# Patient Record
Sex: FEMALE | Race: WHITE | Employment: PART TIME | URBAN - METROPOLITAN AREA
[De-identification: names, ages, dates, MRNs, and addresses within clinical notes are randomized per-mention and may not be internally consistent; named-entity substitution may affect disease eponyms.]

---

## 2017-08-08 PROBLEM — R53.83 FATIGUE, UNSPECIFIED TYPE: Status: ACTIVE | Noted: 2017-08-08

## 2017-12-27 PROBLEM — F41.9 ANXIETY: Status: ACTIVE | Noted: 2017-12-27

## 2017-12-27 PROBLEM — N92.0 MENORRHAGIA WITH REGULAR CYCLE: Status: ACTIVE | Noted: 2017-12-27

## 2021-06-02 NOTE — ED PROVIDER NOTES
Swelling  Patient Seen in: [facility] Emergency Department      History   Patient presents with:  Hypertension  Allergic Rxn Allergies    Stated Complaint: htn, rash    HPI/Subjective:   HPI    49-year-old female presents to the emergency department w Tobacco Use      Smoking status: Current Every Day Smoker        Packs/day: 0.50        Years: 30.00        Pack years: 15        Types: Cigarettes      Smokeless tobacco: Former User        Types: Chew        Quit date: 5/6/2008      Tobacco comment: Wenceslao Chowdary back: Normal range of motion and neck supple. Lymphadenopathy:      Cervical: No cervical adenopathy. Skin:     General: Skin is warm and dry. Coloration: Skin is not pale.       Comments: Flushed face and chest   Neurological:      General: No foc TECHNIQUE:  AP chest radiograph was obtained. COMPARISON:  None. INDICATIONS:  htn, rash  PATIENT STATED HISTORY: (As transcribed by Technologist)  Patient offered no additional history at this time.     FINDINGS:  Normal heart size and pulmonary vascular [address]  [phone]    Schedule an appointment as soon as possible for a visit            Medications Prescribed:  Discharge Medication List as of 6/2/2021  4:13 PM    START taking these medications    !!  Montelukast Sodium 10 MG Oral Tab  Take 1 tablet (1

## 2023-05-03 PROBLEM — J30.2 SEASONAL ALLERGIES: Status: ACTIVE | Noted: 2023-05-03

## 2023-05-03 PROBLEM — J02.9 SORE THROAT: Status: ACTIVE | Noted: 2023-05-01

## 2023-05-03 PROBLEM — J38.7 EPIGLOTTIC LESION: Status: ACTIVE | Noted: 2023-05-01

## 2023-05-16 NOTE — ANESTHESIA PROCEDURE NOTES
Airway  Date/Time: 5/16/2023 1:19 PM  Urgency: elective    Airway not difficult    General Information and Staff    Patient location during procedure: OR  Anesthesiologist: Raphael Rocha MD  Resident/CRNA: Paola Villavicencio CRNA  Performed: CRNA   Performed by: Paola Villavicencio CRNA  Authorized by: Raphael Rocha MD      Indications and Patient Condition  Indications for airway management: anesthesia  Sedation level: deep  Preoxygenated: yes  Patient position: sniffing  Mask difficulty assessment: 1 - vent by mask    Final Airway Details  Final airway type: endotracheal airway      Successful airway: ETT  Cuffed: yes   Successful intubation technique: direct laryngoscopy  Endotracheal tube insertion site: oral  Blade: Flory  Blade size: #3  ETT size (mm): 6.0    Cormack-Lehane Classification: grade I - full view of glottis  Placement verified by: chest auscultation and capnometry   Measured from: lips  ETT to lips (cm): 21  Number of attempts at approach: 1

## 2023-05-16 NOTE — DISCHARGE INSTRUCTIONS
Call [address] [place] [address] ENT clinic at [phone] or if it is after hours ask to have the doctor on call paged or go to the nearest Emergency Room if you have:    * Any worsening neck swelling or redness  * Any difficulty or changes to your breathing. * A temperature greater than 102F  * Vomiting that lasts more than 24 hours  * Severe pain that gets worse and is not helped by medicine  * Coughing that will not go away  * Neck pain, stiffness or has a hard time turning their head    Call with any other questions or concerns    Appointments you need to make: You should make a follow up appointment for 2 weeks after surgery. What to expect:  * You will have some neck stiffness and soreness  * This should improve over the next couple of days. Pain:  * Your may have acetaminophen (Tylenol) every 4 to 6 hours   * Your doctor may prescribe stronger pain medicine, follow your doctor's instructions for taking pain medicines  * If your doctor gives you a stronger pain medicine (roxicet or lortab), please remember that these medications contain acetaminophen (Tylenol) already. So please do NOT give Roxicet/Lortab and additional acetaminophen (tylenol) at the SAME time. * If you need additional pain medication, please call the nursing line at [phone]    Diet:  * Start with clear liquids (flat white soda, water, broth, apple juice, and popsicles)  * If you do not have an upset stomach when fully awake from surgery, a soft diet can be started. Avoid spicy, acidic or rough foods (includes toast, crackers, and potato chips)      Activity:  * Recovery takes 1-2 weeks.   Avoid rough play, gym, swimming, and contact sports during this time      With any concerns or questions, or ANY bleeding, call and ask for the ENT on call physician

## 2023-05-16 NOTE — OPERATIVE REPORT
Marcy 83 Patient Status:  Hospital Outpatient Surgery    1970 MRN [id number]   [facility] SURGERY Attending Leticia Lombardo MD   Hosp Day # 0 PCP Karen Perez MD     Direct Laryngoscopy and Biopsy Op Note  Pre-Op Diagnosis:  Airway evaluation, supraglottic lesion  Post-Op Diagnosis:  Same  Procedure:   Direct laryngoscopy and biopsy  Surgeon: Adiel  Anesthesia: General  Indications for Procedure:  Alee Willis is a very pleasant female with a history of smoking. Has a supraglottic lesion on exam in office. The above-named procedure was offered for definitive treatment. Procedure in Detail:    After informed consent was obtained from the parents, and risks and benefits of the procedure were explained, the patient was brought back to the operating room and laid supine. Next, general anesthesia was obtained, and an IV established, and lidocaine was sprayed onto the vocal folds and subglottis. The table was rotated 90 degrees away from anesthesia. An ossoff laryngoscope was used to expose. The tonsillar fossa, base of tongue, epiglottis and pyriforms were evaluated and were normal.  The laryngoscope was advanced past the epiglottis and there was a fungating lesion on the left aryepiglottic fold into the false cord. This was biopsied. The remainder of the larynx was evaluated and showed some rhonda's edema but no lesion. The patient was turned back to anesthesia. There were no complications and the patient tolerated the procedure well. EBL:  5 cc  IVF:  100cc LR  Specimens:  L AE fold, left false cord, left vallecula  UO: None  Condition:   To PACU stable  Genny Gerber MD  2023  1:53 PM

## 2023-05-16 NOTE — H&P
History and physical by Dr. Radha Moore on 5/10 reviewed and up to date. No changes to H&P.     Plan is direct laryngoscopy with biopsy

## 2023-09-20 NOTE — TELEPHONE ENCOUNTER
Dana from [facility] called for a consult. DX-Squamous cell carcinoma of the larynx. Referred by Dr Marlon Castle. Please call back. Thank you.

## 2023-09-21 ENCOUNTER — TELEPHONE (OUTPATIENT)
Dept: RADIATION ONCOLOGY | Facility: HOSPITAL | Age: 53
End: 2023-09-21

## 2023-09-21 NOTE — TELEPHONE ENCOUNTER
Returned  call from [address] [place] [address] at [employer] on this pt. I left hannah heaton .

## 2023-10-03 NOTE — PROGRESS NOTES
Outpatient Oncology Care Plan   Problem list:   fatigue   Problems related to:   disease/disease progression   Interventions:   provided general teaching   Expected outcomes:   understands plan of care   Progress towards outcome: making progress     Education Record   Learner: Patient   Barriers / Limitations: None   Method: Discussion   Outcome: Shows understanding   Comments:  Patient here as a new consult. Energy levels are poor. States she is not sleeping well due to 10/10 right jaw ,head,neck and throat pain. Was taking norco, but states it did not help manage. Oxycodone too strong. Has a constant cough. Denies any hemoptysis. Has lost 10-15 pounds in the past three months. Had recent imaging performed with [facility] today.

## 2023-10-03 NOTE — PROGRESS NOTES
Nursing Consultation Note  Patient: Estrella Kennedy  YOB: 1970  Age: 48year old  Radiation Oncologist: Dr. Mark Granda  Referring Physician: [email or web address]  Consult Date: 10/3/2023      Chemotherapy: N/a  Labs: Reviewed  Imaging: Reviewed  Is the patient of child-bearing age? No  Has the patient received radiation therapy in the past? no  Does the patient have an implantable device? No   Patient has/has had:     1. Assistive Devices: N/A    2. Flu Vaccination: yes    3. Pneumonia Vaccination:  yes    Vital Signs: There were no vitals filed for this visit., Wt Readings from Last 6 Encounters:  10/04/23 : 63.6 kg (140 lb 3.2 oz)  10/03/23 : 64.4 kg (142 lb)  05/16/23 : 64.9 kg (143 lb)  11/17/21 : 69.9 kg (154 lb)  09/29/21 : 70.3 kg (155 lb)  08/31/21 : 70.8 kg (156 lb)      Nursing Note: Hx of squamous cell of larynx [id number]. Had one year of throat pain. R>L. Had 2 biopsies that were benign. Had dysphagia and weight loss. Imaging from 9/13/23 concerning for supraglottic mass. Has 40 pack year smoking history and still smokes 1 PPD. Pt was given the option of surgery vs. RT. Pt opted for RT. Pt has pain in right side of neck/jaw. Rates 10/10. Took [place] this morning. Has been on soft diet since March/April of 2023. Denies difficulty swallowing other than some gel pills (Advil). Is now down to 1/2 pack cigarettes per day. Review of Systems   Constitutional:  Positive for appetite change, fatigue and unexpected weight change. HENT:  Positive for congestion, ear pain, postnasal drip, sinus pressure, sore throat, trouble swallowing and voice change. Eyes: Negative. Respiratory:  Positive for cough. Cardiovascular: Negative. Gastrointestinal:  Positive for constipation and nausea. Endocrine: Negative. Genitourinary: Negative. Musculoskeletal:  Positive for neck pain. Skin: Negative.     Allergic/Immunologic: Positive for environmental allergies and food allergies. Neurological:  Positive for headaches. Unsteady at times     Hematological: Negative. Psychiatric/Behavioral: Negative. Allergies:    Latex                   RASH, OTHER (SEE COMMENTS)  Ceftin [Cefuroxime]     RASH  Codeine                   Oxycodone                 Vicodin [Hydrocodon*      Clindamycin             RASH  Penicillins             RASH    Current Outpatient Medications   Medication Sig Dispense Refill    omeprazole 20 MG Oral Capsule Delayed Release Take 1 capsule (20 mg total) by mouth every morning before breakfast.      nicotine 7 MG/24HR Transdermal Patch 24 Hr Place 1 patch onto the skin daily. buPROPion HCl ER, Smoking Det, 150 MG Oral Tablet 12 Hr Take 1 tablet (150 mg total) by mouth 2 (two) times daily. HYDROcodone-acetaminophen  MG Oral Tab Take 1-2 tablets by mouth every 4 (four) hours as needed for Pain. 120 tablet 0    lisinopril 20 MG Oral Tab Take 1 tablet (20 mg total) by mouth daily. MONTELUKAST 10 MG Oral Tab TAKE 1 TABLET(10 MG) BY MOUTH EVERY NIGHT 90 tablet 0    albuterol 108 (90 Base) MCG/ACT Inhalation Aero Soln Inhale 2 puffs into the lungs every 4 (four) hours as needed. 54 g 1    Budesonide-Formoterol Fumarate (SYMBICORT) 160-4.5 MCG/ACT Inhalation Aerosol INHALE 2 PUFFS BY MOUTH EVERY MORNING 1 each 5    fluticasone propionate 50 MCG/ACT Nasal Suspension SHAKE LIQUID AND USE 2 SPRAYS IN EACH NOSTRIL DAILY 48 g 1    ALPRAZolam 0.5 MG Oral Tab Take 1 tablet (0.5 mg total) by mouth 3 (three) times daily as needed. 90 tablet 0    benzonatate 200 MG Oral Cap Take 1 capsule (200 mg total) by mouth 3 (three) times daily as needed for cough. 50 capsule 1    Simethicone (GAS-X OR) daily. albuterol (VENTOLIN) (2.5 MG/3ML) 0.083% Inhalation Nebu Soln Take 3 mL (2.5 mg total) by nebulization every 6 (six) hours as needed. EPINEPHrine 0.3 MG/0.3ML Injection Solution Auto-injector Inject 0.3 mL as directed one time. Injectf into the muscle one time as needed for anaphylaxis for up to 2 doses (Patient not taking: Reported on 10/3/2023)         Preferred Pharmacy:    [facility], [address] AT [address], [phone], [phone]  [address] [facility] [address]  Phone: [phone] Fax: [address], [address] [place] [address], [phone], [phone]  [address] [address] [address]  Phone: [phone] Fax: [address], [phone], [address]  [place] [address]  Phone: [phone] Fax: [address] [facility] [address] [facility] [address], [address] [facility],[address] AT [facility] [address] [facility] [address], [phone], [phone]  [facility] [address]  Phone: [phone] Fax: [phone]      Past Medical History:   Diagnosis Date    Anxiety 12/27/2017    BLOOD DISORDER     anemia    Esophageal reflux     Extrinsic asthma, unspecified     Hx of motion sickness     Irritable bowel syndrome     Migraines     Problems with swallowing     5/3/23 pt states has been limited to soft foods d/t growth in throat; denies difficulty swallowing liquids    Reflux     Ulcer        Past Surgical History:   Procedure Laterality Date    BENIGN BIOPSY RIGHT  05/22/2013    FA    ENDOMETRIAL ABLATION      EXCISION TURBINATE,SUBMUCOUS  08/14/2013    Procedure: ADENOIDECTOMY;  Surgeon: Saeed Steiner MD;  Location: [address] [address]  08/14/2013    Procedure: ADENOIDECTOMY;  Surgeon: Saeed Steiner MD;  Location: [address] [place] [address] HISTORY      CS, esphogel polypectomy x2    OTHER SURGICAL HISTORY      liposuction    REMOVAL ADENOIDS,PRIMARY,12+ Y/O 08/14/2013    Procedure: ENDOSCOPIC SUBMUCOUS RESECTION INFERIOR TURBINATES;  Surgeon: Gretta Loza MD;  Location: [facility] [place] OF NASAL SEPTUM  08/14/2013    Procedure: SEPTOPLASTY NASAL;  Surgeon: Gretta Loza MD;  Location: [address] [place] [address]       Social History    Socioeconomic History      Marital status:       Spouse name: Not on file      Number of children: 1      Years of education: Not on file      Highest education level: Not on file    Occupational History      Occupation:         Comment: working 3 days per week    Tobacco Use      Smoking status: Every Day        Packs/day: 0.50        Years: 30.00        Additional pack years: 0.00        Total pack years: 15.00        Types: Cigarettes      Smokeless tobacco: Former        Types: Chew        Quit date: 5/6/2008    Vaping Use      Vaping Use: Never used    Substance and Sexual Activity      Alcohol use: Yes        Alcohol/week: 5.0 standard drinks of alcohol        Types: 6 Standard drinks or equivalent per week        Comment: 2x a week      Drug use: No      Sexual activity: Not on file    Other Topics      Concerns:        Not on file    Social History Narrative      - lives alone      Has one grown son - lives in [place] [address] Determinants of Health  Financial Resource Strain: Not on file  Food Insecurity: Not on file  Transportation Needs: Not on file  Physical Activity: Not on file  Stress: Not on file  Social Connections: Not on file  Housing Stability: Not on file    ECOG:  Grade 0 - Fully active, able to carry on all predisease activities without restrictions. Education:  Knowledge Deficit Plan Of Care:    Problem:  Knowledge Deficit    Problems related to:    Radiation therapy    Interventions:   Instruct on treatment planning    Expected Outcomes:  Knowledge of radiation therapy    Progress Toward Outcome:  Making progress    Pamphlets/Handouts Given to Patient:  Understanding radiation therapy      Are ADL's met? Yes  Does patient feel safe in their environment? Yes  Care decisions:  Patient and/or surrogate IS involved in care decisions. Advanced directives:  Patient DOES NOT have advanced directives.   Transportation:  Adequate transportation available for expected visits    Pain: R sided neck pain

## 2023-10-04 PROBLEM — Z51.5 PALLIATIVE CARE BY SPECIALIST: Status: ACTIVE | Noted: 2023-10-04

## 2023-10-04 PROBLEM — C76.0 HEAD AND NECK CANCER (HCC): Status: ACTIVE | Noted: 2023-10-04

## 2023-10-04 PROBLEM — C32.8: Status: ACTIVE | Noted: 2023-10-04

## 2023-10-04 NOTE — CONSULTS
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 Presents with long history of sore throat. Evolved into dysphagia. 10 pound weight loss. Right neck and ear pain. Seen by multiple local ENTs. Apparently had laryngoscopy with biopsy in May and in August that were nondiagnostic. Decided to transfer care to [facility]. Saw Dr. Marie Odonnell. He noted a supraglottic mass in the office exam.      DL under anesthesia 9/11/2023 revealed a large mass involving the entire epiglottis, both lingual and laryngeal surfaces, right more than left, extending onto the right AE fold, abutting the base of tongue but not invading it, sparing the false and true vocal cords, without involvement of the hypopharynx. Biopsy shows squamous cell carcinoma, predominantly p16 positive which is somewhat surprising. CT neck and chest 9/13/2023 shows the right epiglottic mass extending onto the right AEF, narrowing the airway, not involving the preepiglottic space, without clear cervical adenopathy. The cartilage structures are intact. PET scan 10/3/2023 shows the supraglottic laryngeal mass with SUV 19.8. Several small right-sided and left-sided cervical nodes with SUV ranging between 2.6 and 4.7. The patient consulted with Dr. Sharonda Monet yesterday. She is reluctant to consider chemotherapy with concerns of nausea, cachexia, complete alopecia. She will see the palliative care service today. A little drowsy on Norco.  History of ulcer disease. History of esophageal dilatation more than 5 years ago. Has been on a soft diet since then. Last saw a DDS around 2020.     PMH  -Anxiety, GERD, esophageal disease status post dilatation, peptic ulcer disease, IBS, migraines, asthma    PSH  -Endometrial ablation, sinus surgery, , esophageal polypectomies, liposuction    MEDS  -Albuterol, Xanax, Tessalon, Symbicort, Zyban, Norco, Prinivil, montelukast, nicotine patch, Prilosec, Gas-X    SH   -, 1 child, smoking 1 pack a day, 30 pack years, works Thursday and Friday, not a whole lot of support at home    [facility]  -Father had esophageal/lung cancer    ROS  -Throat pain, solid food dysphagia, both oral pharyngeal and esophageal, mild weight loss, ear pain, mild hoarseness    PHYSICAL EXAM   ECO  139 pounds, 10 out of 10 pain  GEN:  No acute distress. HEENT:  I could not palpate any cervical adenopathy. Unremarkable oral cavity. Unremarkable oropharynx. Teeth in ok repair. CHEST:  Regular rate and rhythm. Clear to auscultation. Good respiratory effort. ABDOMEN: Soft, non-tender. EXT:  No edema. NEURO:  Alert, oriented. Cranial nerves grossly intact. 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 T2 N0 disease prior to PET. This is bulky disease. Airway somewhat narrow, but no trach needed. Based on PET, will now have to assume N2c disease. CT guided biopsy of a node may help rule in node+ disease, but hard to rule out node involvement unless we biopsy numerous nodes. Atypical PET+ nodes anterior to R lateral pterygoid and anterior to R cricoid. She is p16+, but again, atypical location and smoking history, so cannot approach her as typical p16+ patient. Surgery first with jessica dissection would probably lead to a need for postoperative radiation anyway. Agree with and favor organ preserving chemoradiation as the next step. Patient now open to weekly chemo option. Had discussion about the many short and long term effects of radiation to the head/neck.  Dry mouth, weight loss, taste bud dysfunction, dental issues, dysphagia, voice changes, soft tissue swelling and fibrosis. PLAN:   -see DDS, but really cannot delay too long given the SGL tumor narrowing airway  -CT simulation on Monday (can't do Th/F because of work)  -CT biopsy of node next week  -dietary eval  -speech eval at some point  -70 Gy radiation with concurrent chemo  -social work to see  -smoking cessation    Calvin Goodman MD  Radiation Oncology    CC: MD Vaishali Baca, APRN

## 2023-10-04 NOTE — PROGRESS NOTES
ONCOLOGY NUTRITION SCREEN complete as triggered by [facility] dx of cT2 cN2 cM0 Laryngeal SCCa, p16+. Chart reviewed. Noted pt reporting, 10-15 lb wt loss. Noted w/u ongoing. Pt assessed at a high nutrition risk. RD will plan to meet w/ pt once tx commences.

## 2023-10-04 NOTE — PATIENT INSTRUCTIONS
- WE WILL CALL TO SCHEDULE YOUR CT SIMULATION FOR RADIATION PLANNING.     - IF YOU HAVE ANY QUESTIONS OR CONCERNS REGARDING RADIATION THERAPY, PLEASE CALL [phone].       - WHEN USING NORCO, USE MIROLAX FOR CONSTIPATION

## 2023-10-04 NOTE — CONSULTS
Palliative Care Consult Note     Patient Name: Sherice Cordero   YOB: 1970   Medical Record Number: [id number]   CSN: [id number]   Date of visit: 10/4/2023     Reason for Consult: Referred by Dr. Bethany Valle  for Symptom management     Chief Complaint/Reason for Visit:  Initial visit for symptoms     History of Present Illness:         Sherice Cordero is a 48year old female with larynx cancer who will start chemo/RT. She complains of R ear, jaw, cheek and throat pain. Its persistent and varies in intensity. The pain is deep achy stabbing pain. This pain affects sleep and its painful to swallow. She complains of thick saliva and coughs a lot. She feels the benzonatate prescribed by her PCP is helpful for this. She states she was started on oxycontin and this caused vertigo and vomiting. She began using norco yesterday with some benefit and is tolerating this well. She has used 3 doses since yesterday. She is waiting until pain spikes before taking the norco so she feels her pain is not well controlled. She struggles with anxiety and feels this has intensified with her new diagnosis and pain. She has used xanax for many years with benefit. She doesn't tolerate most SSRIs. She denies constipation. She is having some nausea which happens when she is stressed or has post nasal drip. She takes no regular antiemetics.         Problem List:  Patient Active Problem List:     Asthma     Deviated nasal septum     GERD (gastroesophageal reflux disease)     Tobacco abuse     Fibroadenoma     Verruca     Sinusitis     Knee contusion     Esophageal spasm     Iron deficiency anemia due to chronic blood loss     Sinusitis, acute     Subacute pansinusitis     Iron deficiency     Mass of breast, right     Breast tenderness     Chronic bronchitis (HCC)     Persistent cough     Fatigue, unspecified type     Menorrhagia with regular cycle     Anxiety     Epiglottic lesion     Seasonal allergies     Sore throat Head and neck cancer ([facility] [address]     Malignant neoplasm of overlapping sites of larynx [facility])     Palliative care by specialist     Medical History:  Past Medical History:   Diagnosis Date    Anxiety 12/27/2017    BLOOD DISORDER     anemia    Esophageal reflux     Extrinsic asthma, unspecified     Head and neck cancer ([facility] [address] 10/4/2023    Hx of motion sickness     Irritable bowel syndrome     Migraines     Problems with swallowing     5/3/23 pt states has been limited to soft foods d/t growth in throat; denies difficulty swallowing liquids    Reflux     Ulcer      Surgical History:  Past Surgical History:   Procedure Laterality Date    BENIGN BIOPSY RIGHT  05/22/2013    FA    ENDOMETRIAL ABLATION      EXCISION TURBINATE,SUBMUCOUS  08/14/2013    Procedure: ADENOIDECTOMY;  Surgeon: Gabriella Kenney MD;  Location: [address]  08/14/2013    Procedure: ADENOIDECTOMY;  Surgeon: Gabriella Kenney MD;  Location: [address] [place] [address]      CS, esphogel polypectomy x2    OTHER SURGICAL HISTORY      liposuction    REMOVAL ADENOIDS,PRIMARY,12+ Y/O  08/14/2013    Procedure: ENDOSCOPIC SUBMUCOUS RESECTION INFERIOR TURBINATES;  Surgeon: Gabriella Kenney MD;  Location: [address] [place] [address] [place] OF NASAL SEPTUM  08/14/2013    Procedure: SEPTOPLASTY NASAL;  Surgeon: Gabriella Kenney MD;  Location: [address] [place] [address]       Allergies:    Latex                   RASH, OTHER (SEE COMMENTS)  Ceftin [Cefuroxime]     RASH  Codeine                   Oxycodone                 Vicodin [Hydrocodon*      Clindamycin             RASH  Penicillins             RASH    Palliative Care Social History:    Marital Status:  she is . Benitez Hand is her support person    Functional History:    ADLs: Independent.   She works part-time as a     Medications:  Current Outpatient Medications   Medication Sig Dispense Refill    omeprazole 20 MG Oral Capsule Delayed Release Take 1 capsule (20 mg total) by mouth every morning before breakfast.      nicotine 7 MG/24HR Transdermal Patch 24 Hr Place 1 patch onto the skin daily. buPROPion HCl ER, Smoking Det, 150 MG Oral Tablet 12 Hr Take 1 tablet (150 mg total) by mouth 2 (two) times daily. HYDROcodone-acetaminophen  MG Oral Tab Take 1-2 tablets by mouth every 4 (four) hours as needed for Pain. 120 tablet 0    lisinopril 20 MG Oral Tab Take 1 tablet (20 mg total) by mouth daily. MONTELUKAST 10 MG Oral Tab TAKE 1 TABLET(10 MG) BY MOUTH EVERY NIGHT 90 tablet 0    albuterol 108 (90 Base) MCG/ACT Inhalation Aero Soln Inhale 2 puffs into the lungs every 4 (four) hours as needed. 54 g 1    Budesonide-Formoterol Fumarate (SYMBICORT) 160-4.5 MCG/ACT Inhalation Aerosol INHALE 2 PUFFS BY MOUTH EVERY MORNING 1 each 5    fluticasone propionate 50 MCG/ACT Nasal Suspension SHAKE LIQUID AND USE 2 SPRAYS IN EACH NOSTRIL DAILY 48 g 1    ALPRAZolam 0.5 MG Oral Tab Take 1 tablet (0.5 mg total) by mouth 3 (three) times daily as needed. 90 tablet 0    EPINEPHrine 0.3 MG/0.3ML Injection Solution Auto-injector Inject 0.3 mL as directed one time. Injectf into the muscle one time as needed for anaphylaxis for up to 2 doses (Patient not taking: Reported on 10/3/2023)      benzonatate 200 MG Oral Cap Take 1 capsule (200 mg total) by mouth 3 (three) times daily as needed for cough. 50 capsule 1    Simethicone (GAS-X OR) daily. albuterol (VENTOLIN) (2.5 MG/3ML) 0.083% Inhalation Nebu Soln Take 3 mL (2.5 mg total) by nebulization every 6 (six) hours as needed. Review of Systems:  General:  Fatigue. Feels well. Respiratory:  Denies SOB, denies cough  Cardiac:  Denies chest pain, heart palpitations  Abdomen:  Denies constipation, diarrhea. Denies pain. Psych:  No complaints.   Sleeping well    Palliative Performance Scale:  100 %    Physical Examination:  General: Patient is alert and oriented, not in acute distress. Respiratory: Normal excursions and effort  Cardiac:   No edema  Abdomen: Soft, non tender   Musculoskeletal: Normal gait. Psych:  Mood/Affect appropriate    Advanced Directives Discussed and Completed:     HCPOA/Health Surrogate: There is no completed HCPOA documentation on file in [facility]. Pain was focus today    Palliative Care:  she feels norco is providing some pain relief. Discussed using it more aggressively to minimize pain spikes and keep pain more manageable. She can take a dose prior to bed to reduce waking up in pain  She will keep a pain diary. Discussed pain plan may need some adjusting through treatment and then the goal is to wean once treatment is completed and pain improves. Discussed bowel prophylaxis. Anxiety is controlled with her chronic xanax managed by PCP  She felt less anxious and better at end of visit today as she feels there is a plan for treatment and pain control now  Opioid contract signed today    Impression/Plan:   1. Neoplasm related pain  Norco 10mg Q 4 prn    2. Nausea  She feels this is at baseline    3. Anxiety  Xanax 0.25mg TID, 0.5mg Q HS      Planned Follow up: Return in about 1 month (around 2023). Encounter Times  Reviewing/Obtainin minutes    Medical Exam:   minutes      Plan:   5 minutes    Notes:   10 minutes      Counseling/Education:   35 minutes    Care Coordination:   minutes      My total time spent caring for the patient on the day of the encounter:   55 minutes. The [address] [place] [address] makes medical notes like these available to patients in the interest of transparency. Please be advised this is a medical document. Medical documents are intended to carry relevant information, facts as evident, and the clinical opinion of the practitioner. The medical note is intended as peer to peer communication and may appear blunt or direct.  It is written in medical language and may contain abbreviations or verbiage that are unfamiliar.         Electronically Signed by:  RAIN Cole Outpatient Palliative Nurse Practitioner

## 2023-10-04 NOTE — H&P (VIEW-ONLY)
Palliative Care Consult Note     Patient Name: Kristel Hu   YOB: 1970   Medical Record Number: [id number]   CSN: [id number]   Date of visit: 10/4/2023     Reason for Consult: Referred by Dr. Nilo Csae  for Symptom management     Chief Complaint/Reason for Visit:  Initial visit for symptoms     History of Present Illness:         Kristel Hu is a 48year old female with larynx cancer who will start chemo/RT. She complains of R ear, jaw, cheek and throat pain. Its persistent and varies in intensity. The pain is deep achy stabbing pain. This pain affects sleep and its painful to swallow. She complains of thick saliva and coughs a lot. She feels the benzonatate prescribed by her PCP is helpful for this. She states she was started on oxycontin and this caused vertigo and vomiting. She began using norco yesterday with some benefit and is tolerating this well. She has used 3 doses since yesterday. She is waiting until pain spikes before taking the norco so she feels her pain is not well controlled. She struggles with anxiety and feels this has intensified with her new diagnosis and pain. She has used xanax for many years with benefit. She doesn't tolerate most SSRIs. She denies constipation. She is having some nausea which happens when she is stressed or has post nasal drip. She takes no regular antiemetics.         Problem List:  Patient Active Problem List:     Asthma     Deviated nasal septum     GERD (gastroesophageal reflux disease)     Tobacco abuse     Fibroadenoma     Verruca     Sinusitis     Knee contusion     Esophageal spasm     Iron deficiency anemia due to chronic blood loss     Sinusitis, acute     Subacute pansinusitis     Iron deficiency     Mass of breast, right     Breast tenderness     Chronic bronchitis (HCC)     Persistent cough     Fatigue, unspecified type     Menorrhagia with regular cycle     Anxiety     Epiglottic lesion     Seasonal allergies     Sore throat Head and neck cancer ([facility] [address]     Malignant neoplasm of overlapping sites of larynx [facility])     Palliative care by specialist     Medical History:  Past Medical History:   Diagnosis Date    Anxiety 12/27/2017    BLOOD DISORDER     anemia    Esophageal reflux     Extrinsic asthma, unspecified     Head and neck cancer ([facility] [address] 10/4/2023    Hx of motion sickness     Irritable bowel syndrome     Migraines     Problems with swallowing     5/3/23 pt states has been limited to soft foods d/t growth in throat; denies difficulty swallowing liquids    Reflux     Ulcer      Surgical History:  Past Surgical History:   Procedure Laterality Date    BENIGN BIOPSY RIGHT  05/22/2013    FA    ENDOMETRIAL ABLATION      EXCISION TURBINATE,SUBMUCOUS  08/14/2013    Procedure: ADENOIDECTOMY;  Surgeon: Jai Metz MD;  Location: [address]  08/14/2013    Procedure: ADENOIDECTOMY;  Surgeon: Jai Metz MD;  Location: [address] [place] [address]      CS, esphogel polypectomy x2    OTHER SURGICAL HISTORY      liposuction    REMOVAL ADENOIDS,PRIMARY,12+ Y/O  08/14/2013    Procedure: ENDOSCOPIC SUBMUCOUS RESECTION INFERIOR TURBINATES;  Surgeon: Jai Metz MD;  Location: [address] [place], [place] [address] OF NASAL SEPTUM  08/14/2013    Procedure: SEPTOPLASTY NASAL;  Surgeon: Jai Metz MD;  Location: [address] [place] [address]       Allergies:    Latex                   RASH, OTHER (SEE COMMENTS)  Ceftin [Cefuroxime]     RASH  Codeine                   Oxycodone                 Vicodin [Hydrocodon*      Clindamycin             RASH  Penicillins             RASH    Palliative Care Social History:    Marital Status:  she is . Anabell Dayo is her support person    Functional History:    ADLs: Independent.   She works part-time as a     Medications:  Current Outpatient Medications   Medication Sig Dispense Refill    omeprazole 20 MG Oral Capsule Delayed Release Take 1 capsule (20 mg total) by mouth every morning before breakfast.      nicotine 7 MG/24HR Transdermal Patch 24 Hr Place 1 patch onto the skin daily. buPROPion HCl ER, Smoking Det, 150 MG Oral Tablet 12 Hr Take 1 tablet (150 mg total) by mouth 2 (two) times daily. HYDROcodone-acetaminophen  MG Oral Tab Take 1-2 tablets by mouth every 4 (four) hours as needed for Pain. 120 tablet 0    lisinopril 20 MG Oral Tab Take 1 tablet (20 mg total) by mouth daily. MONTELUKAST 10 MG Oral Tab TAKE 1 TABLET(10 MG) BY MOUTH EVERY NIGHT 90 tablet 0    albuterol 108 (90 Base) MCG/ACT Inhalation Aero Soln Inhale 2 puffs into the lungs every 4 (four) hours as needed. 54 g 1    Budesonide-Formoterol Fumarate (SYMBICORT) 160-4.5 MCG/ACT Inhalation Aerosol INHALE 2 PUFFS BY MOUTH EVERY MORNING 1 each 5    fluticasone propionate 50 MCG/ACT Nasal Suspension SHAKE LIQUID AND USE 2 SPRAYS IN EACH NOSTRIL DAILY 48 g 1    ALPRAZolam 0.5 MG Oral Tab Take 1 tablet (0.5 mg total) by mouth 3 (three) times daily as needed. 90 tablet 0    EPINEPHrine 0.3 MG/0.3ML Injection Solution Auto-injector Inject 0.3 mL as directed one time. Injectf into the muscle one time as needed for anaphylaxis for up to 2 doses (Patient not taking: Reported on 10/3/2023)      benzonatate 200 MG Oral Cap Take 1 capsule (200 mg total) by mouth 3 (three) times daily as needed for cough. 50 capsule 1    Simethicone (GAS-X OR) daily. albuterol (VENTOLIN) (2.5 MG/3ML) 0.083% Inhalation Nebu Soln Take 3 mL (2.5 mg total) by nebulization every 6 (six) hours as needed. Review of Systems:  General:  Fatigue. Feels well. Respiratory:  Denies SOB, denies cough  Cardiac:  Denies chest pain, heart palpitations  Abdomen:  Denies constipation, diarrhea. Denies pain. Psych:  No complaints.   Sleeping well    Palliative Performance Scale:  100 %    Physical Examination:  General: Patient is alert and oriented, not in acute distress. Respiratory: Normal excursions and effort  Cardiac:   No edema  Abdomen: Soft, non tender   Musculoskeletal: Normal gait. Psych:  Mood/Affect appropriate    Advanced Directives Discussed and Completed:     HCPOA/Health Surrogate: There is no completed HCPOA documentation on file in [facility]. Pain was focus today    Palliative Care:  she feels norco is providing some pain relief. Discussed using it more aggressively to minimize pain spikes and keep pain more manageable. She can take a dose prior to bed to reduce waking up in pain  She will keep a pain diary. Discussed pain plan may need some adjusting through treatment and then the goal is to wean once treatment is completed and pain improves. Discussed bowel prophylaxis. Anxiety is controlled with her chronic xanax managed by PCP  She felt less anxious and better at end of visit today as she feels there is a plan for treatment and pain control now  Opioid contract signed today    Impression/Plan:   1. Neoplasm related pain  Norco 10mg Q 4 prn    2. Nausea  She feels this is at baseline    3. Anxiety  Xanax 0.25mg TID, 0.5mg Q HS      Planned Follow up: Return in about 1 month (around 2023). Encounter Times  Reviewing/Obtainin minutes    Medical Exam:   minutes      Plan:   5 minutes    Notes:   10 minutes      Counseling/Education:   35 minutes    Care Coordination:   minutes      My total time spent caring for the patient on the day of the encounter:   55 minutes. The [address] makes medical notes like these available to patients in the interest of transparency. Please be advised this is a medical document. Medical documents are intended to carry relevant information, facts as evident, and the clinical opinion of the practitioner. The medical note is intended as peer to peer communication and may appear blunt or direct.  It is written in medical language and may contain abbreviations or verbiage that are unfamiliar.         Electronically Signed by:  RAIN Robins   THE [facility] Outpatient Palliative Nurse Practitioner

## 2023-10-09 ENCOUNTER — HOSPITAL ENCOUNTER (OUTPATIENT)
Dept: RADIATION ONCOLOGY | Facility: HOSPITAL | Age: 53
Discharge: HOME | End: 2023-10-09
Attending: RADIOLOGY
Payer: MEDICAID

## 2023-10-10 NOTE — PROCEDURES
[facility]  Procedure Note    Booker Dunn Patient Status:  Outpatient    1970 MRN [id number]   [facility] ULTRASOUND Attending Chuck Cannon MD   Hosp Day # 0 PCP Princess Jensen MD     Procedure: Left cervical lymph node biopsy    Pre-Procedure Diagnosis:  Cervical lymphadenopathy    Post-Procedure Diagnosis: Same    Anesthesia:  Local    Findings:  Successful biopsy of left cervical lymph node    Specimens: Core biopsy    Blood Loss:  0    Tourniquet Time: 0  Complications:  None  Drains:  None    Secondary Diagnosis:  None    Arnold Maldonado MD  10/10/2023

## 2023-10-10 NOTE — INTERVAL H&P NOTE
The above referenced H&P was reviewed by Sameera Gambino MD on 10/10/2023, the patient was examined and no significant changes have occurred in the patient's condition since the H&P was performed. Risks, benefits, alternative treatments and consequences of no treatment were discussed. We will proceed with procedure as planned.       Sameera Gambino MD  10/10/2023  10:01 AM

## 2023-10-11 ENCOUNTER — APPOINTMENT (OUTPATIENT)
Dept: HEMATOLOGY/ONCOLOGY | Facility: HOSPITAL | Age: 53
End: 2023-10-11
Attending: INTERNAL MEDICINE
Payer: MEDICAID

## 2023-10-12 NOTE — TELEPHONE ENCOUNTER
Dr Breezy Abdul and Dr Harley Greenwood patient - head & neck cancer who has not started treatment yet. Headache/Runny Nose/Post Nasal Drip/Sinus Pain/Right ear/jaw/neck pain    Patient reports on Monday she came for her mask fitting for RT. She told them she had developed a runny nose, post nasal drip, headache, sinus pain and a productive cough. She was told to use OTC cold medication. She has been using Coricidin HBP. Her symptoms have not improved. When she blows her nose and cough she is seeing green mucus. She feels hot and cold, but does not have a fever. She is schedule for a video visit with Dr Dalia Manrique today at 11:30 am.     Patient also reports her \"cancer pain\", right ear, right jaw and neck is \"bad. \" Her pain is \"10/10. \" She has been taking Norco  two tabs every 4 hrs around the clock. Her pain comes down to a \"5/10\", but only for 2 hrs. She is having pain when she eats. No pain when she swallows. No difficulty breathing. She asked if Mariusz Zurita can call her to help adjust her medications? I asked Tony Burrell to please hold. I updated Michelle Potter. She said to tell the patient to keep her video visit appointment. I updated Tony Burrell. I also told her I would forward this message to Dr Breezy Adbul, Dr Harley Greenwood and RAIN Cary.

## 2023-10-12 NOTE — TELEPHONE ENCOUNTER
Patient feels her r jaw, cheek and ear pain are slightly worse since her biopsy. Its deep achy pain and constant. This isn't new pain for her. She can swallow ok but has some pain with swallowing which isn't new. She has used [employer]" in the past with benefit and feels this could help her cheek and throat pain so she can eat and drink better. She is currently using norco 10mg every 4 hours during the day. She  is able to sleep. She tried taking norco 20mg which caused nausea and emesis. She felt this was too much for her. She met with PCP today and was prescribed antibiotic and \"cough medicine\" for bronchitis and sinus infection. She wants to continue norco for now since it was helping her and she would like to continue magic mouthwash as she also finds this helpful    Will refill this for her. If pain continues or is not well controlled, will need to consider changing opioid or adding a long acting.

## 2023-10-12 NOTE — TELEPHONE ENCOUNTER
Patient called. She started with pain in her jaw and ear. It is hard to eat. She is not having a problem swallowing or breathing. Her neck is very bruised. Yoel Wade She wanted to know if she can get the magic mouthwash. Please call back.

## 2023-10-13 PROBLEM — I10 HTN (HYPERTENSION): Status: ACTIVE | Noted: 2023-09-11

## 2023-10-13 NOTE — PROGRESS NOTES
IV Chemotherapy Education    Learner:  Patient and Spouse    Barriers / Limitations:  None    Chemotherapy education goals:  Learn the drug names  Administration schedule  Routes of administration   Treatment setting    Drug names:  cisplatin weekly with RT      Chemotherapy action on cancer / normal cells: Achieved      Treatment Effects on Constitution: Achieved    Anticipated fatigue   Role of activity in recovery   Notify MD/RN of inability to complete ADLs      Treatment Effects on Mucous Membranes: Achieved     Appropriate oral hygiene / signs of stomatitis and thrush  Nausea and vomiting / use of antiemetics  Patient expressed that she is very prone to nausea and experiences nausea and vomiting with most medication (opioids, anesthesia, etc). She would like to be as aggressive as possible with antiemetic regimen. Discussed adding nightly olanzapine for the course of chemoRT, to which she was in agreement. Prescription sent.    Diarrhea / constipation / dietary changes   Notify MD/RN of above symptoms if they persist > 24 hours      Treatment Effects on Nutritional Status: Achieved    Changes in taste perception / appetite  Access to RD for additional support  Notify MD/RN of weight loss or gain and any appetite changes      Treatment Effects on the Skin: Achieved    Anticipated radiation related dermatitis      Treatment Effects on Bone Marrow: Achieved    Function of white blood cells / signs of infection  Function of red blood cells / signs of anemia  Function of platelets / signs of bleeding  Notify MD/RN of any chills or fever 100.5 and above  Notify MD/RN of any bleeding      Treatment Effects on the Bladder and Kidneys: Achieved    Function of the kidneys  Suggested fluid intake / role of supplemental IVF  Notify MD/RN if blood appears in urine or if you have a decreased urine output      Treatment Effects on Emotional Status: Achieved    Potential mood changes, depression, nervousness, difficulty sleeping  Importance of support system  Notify MD/RN of any emotional changes      Teaching Materials Provided:     Chemotherapy information sheets  Dietician information sheet  When to contact the Treatment Team Information Sheet  Side Effect Management [facility] Information sheet    Patient and care partner were given ample opportunity to ask questions. All questions and concerns addressed. We discussed self care techniques, symptom management, fluid, diet, and activities. I spent a total of 60 minutes with the patient, 100 % of that time was spent counseling patient regarding the above documented side effects and management, when to call provider and contact information. Encounter Times  PreCharting: 3 minutes    Reviewing/Obtaining:   minutes      Medical Exam:   minutes    Plan: 5 minutes      Notes: 2 minutes    Counseling/Education: 60 minutes      Referring/Communicating:   minutes    Ind Interpretation:   minutes      Care Coordination: 5 minutes       My total time spent caring for the patient on the day of the encounter: 75 minutes.      Electronically signed:     Chiara James NP-C  Nurse Practitioner  THE [facility]

## 2023-10-17 ENCOUNTER — OFFICE VISIT (OUTPATIENT)
Dept: HEMATOLOGY/ONCOLOGY | Facility: HOSPITAL | Age: 53
End: 2023-10-17
Attending: INTERNAL MEDICINE
Payer: MEDICAID

## 2023-10-17 ENCOUNTER — PATIENT MESSAGE (OUTPATIENT)
Dept: HEMATOLOGY/ONCOLOGY | Facility: HOSPITAL | Age: 53
End: 2023-10-17

## 2023-10-17 ENCOUNTER — SOCIAL WORK SERVICES (OUTPATIENT)
Dept: HEMATOLOGY/ONCOLOGY | Facility: HOSPITAL | Age: 53
End: 2023-10-17

## 2023-10-17 ENCOUNTER — HOSPITAL ENCOUNTER (OUTPATIENT)
Dept: RADIATION ONCOLOGY | Facility: HOSPITAL | Age: 53
End: 2023-10-17
Attending: RADIOLOGY
Payer: MEDICAID

## 2023-10-17 VITALS
TEMPERATURE: 98 F | DIASTOLIC BLOOD PRESSURE: 86 MMHG | WEIGHT: 137.38 LBS | HEART RATE: 98 BPM | SYSTOLIC BLOOD PRESSURE: 134 MMHG | HEIGHT: 61.65 IN | OXYGEN SATURATION: 98 % | BODY MASS INDEX: 25.28 KG/M2 | RESPIRATION RATE: 18 BRPM

## 2023-10-17 DIAGNOSIS — C76.0 HEAD AND NECK CANCER (HCC): Primary | ICD-10-CM

## 2023-10-17 LAB
ALBUMIN SERPL-MCNC: 3.3 G/DL (ref 3.4–5)
ALBUMIN/GLOB SERPL: 0.9 {RATIO} (ref 1–2)
ALP LIVER SERPL-CCNC: 89 U/L
ALT SERPL-CCNC: 13 U/L
ANION GAP SERPL CALC-SCNC: 6 MMOL/L (ref 0–18)
ANION GAP SERPL CALC-SCNC: 6 MMOL/L (ref 0–18)
AST SERPL-CCNC: 10 U/L (ref 15–37)
BASOPHILS # BLD AUTO: 0.07 X10(3) UL (ref 0–0.2)
BASOPHILS NFR BLD AUTO: 0.8 %
BILIRUB SERPL-MCNC: 0.3 MG/DL (ref 0.1–2)
BUN BLD-MCNC: 9 MG/DL (ref 7–18)
BUN BLD-MCNC: 9 MG/DL (ref 7–18)
CALCIUM BLD-MCNC: 10.3 MG/DL (ref 8.5–10.1)
CALCIUM BLD-MCNC: 10.3 MG/DL (ref 8.5–10.1)
CHLORIDE SERPL-SCNC: 104 MMOL/L (ref 98–112)
CHLORIDE SERPL-SCNC: 104 MMOL/L (ref 98–112)
CO2 SERPL-SCNC: 27 MMOL/L (ref 21–32)
CO2 SERPL-SCNC: 27 MMOL/L (ref 21–32)
CREAT BLD-MCNC: 0.78 MG/DL
CREAT BLD-MCNC: 0.78 MG/DL
EGFRCR SERPLBLD CKD-EPI 2021: 91 ML/MIN/1.73M2 (ref 60–?)
EGFRCR SERPLBLD CKD-EPI 2021: 91 ML/MIN/1.73M2 (ref 60–?)
EOSINOPHIL # BLD AUTO: 0.13 X10(3) UL (ref 0–0.7)
EOSINOPHIL NFR BLD AUTO: 1.5 %
ERYTHROCYTE [DISTWIDTH] IN BLOOD BY AUTOMATED COUNT: 11.9 %
FASTING STATUS PATIENT QL REPORTED: NO
FASTING STATUS PATIENT QL REPORTED: NO
GLOBULIN PLAS-MCNC: 3.7 G/DL (ref 2.8–4.4)
GLUCOSE BLD-MCNC: 120 MG/DL (ref 70–99)
GLUCOSE BLD-MCNC: 120 MG/DL (ref 70–99)
HCT VFR BLD AUTO: 38.8 %
HGB BLD-MCNC: 13.4 G/DL
IMM GRANULOCYTES # BLD AUTO: 0.03 X10(3) UL (ref 0–1)
IMM GRANULOCYTES NFR BLD: 0.3 %
LYMPHOCYTES # BLD AUTO: 1.52 X10(3) UL (ref 1–4)
LYMPHOCYTES NFR BLD AUTO: 17.2 %
MCH RBC QN AUTO: 31.3 PG (ref 26–34)
MCHC RBC AUTO-ENTMCNC: 34.5 G/DL (ref 31–37)
MCV RBC AUTO: 90.7 FL
MONOCYTES # BLD AUTO: 0.4 X10(3) UL (ref 0.1–1)
MONOCYTES NFR BLD AUTO: 4.5 %
NEUTROPHILS # BLD AUTO: 6.69 X10 (3) UL (ref 1.5–7.7)
NEUTROPHILS # BLD AUTO: 6.69 X10(3) UL (ref 1.5–7.7)
NEUTROPHILS NFR BLD AUTO: 75.7 %
OSMOLALITY SERPL CALC.SUM OF ELEC: 284 MOSM/KG (ref 275–295)
OSMOLALITY SERPL CALC.SUM OF ELEC: 284 MOSM/KG (ref 275–295)
PLATELET # BLD AUTO: 357 10(3)UL (ref 150–450)
POTASSIUM SERPL-SCNC: 3.8 MMOL/L (ref 3.5–5.1)
POTASSIUM SERPL-SCNC: 3.8 MMOL/L (ref 3.5–5.1)
PROT SERPL-MCNC: 7 G/DL (ref 6.4–8.2)
RBC # BLD AUTO: 4.28 X10(6)UL
SODIUM SERPL-SCNC: 137 MMOL/L (ref 136–145)
SODIUM SERPL-SCNC: 137 MMOL/L (ref 136–145)
WBC # BLD AUTO: 8.8 X10(3) UL (ref 4–11)

## 2023-10-17 PROCEDURE — 80053 COMPREHEN METABOLIC PANEL: CPT

## 2023-10-17 PROCEDURE — 96366 THER/PROPH/DIAG IV INF ADDON: CPT

## 2023-10-17 PROCEDURE — 96413 CHEMO IV INFUSION 1 HR: CPT

## 2023-10-17 PROCEDURE — 96367 TX/PROPH/DG ADDL SEQ IV INF: CPT

## 2023-10-17 PROCEDURE — 85025 COMPLETE CBC W/AUTO DIFF WBC: CPT

## 2023-10-17 PROCEDURE — 96376 TX/PRO/DX INJ SAME DRUG ADON: CPT

## 2023-10-17 PROCEDURE — 96361 HYDRATE IV INFUSION ADD-ON: CPT

## 2023-10-17 PROCEDURE — 96375 TX/PRO/DX INJ NEW DRUG ADDON: CPT

## 2023-10-17 PROCEDURE — 99214 OFFICE O/P EST MOD 30 MIN: CPT | Performed by: INTERNAL MEDICINE

## 2023-10-17 RX ORDER — SODIUM CHLORIDE 9 MG/ML
1000 INJECTION, SOLUTION INTRAVENOUS ONCE
OUTPATIENT
Start: 2023-11-21

## 2023-10-17 RX ORDER — PALONOSETRON 0.05 MG/ML
0.25 INJECTION, SOLUTION INTRAVENOUS ONCE
Status: COMPLETED | OUTPATIENT
Start: 2023-10-17 | End: 2023-10-17

## 2023-10-17 RX ORDER — SODIUM CHLORIDE 9 MG/ML
1000 INJECTION, SOLUTION INTRAVENOUS ONCE
OUTPATIENT
Start: 2023-10-31

## 2023-10-17 RX ORDER — PALONOSETRON 0.05 MG/ML
0.25 INJECTION, SOLUTION INTRAVENOUS ONCE
Start: 2023-10-24 | End: 2023-10-24

## 2023-10-17 RX ORDER — PALONOSETRON 0.05 MG/ML
0.25 INJECTION, SOLUTION INTRAVENOUS ONCE
Start: 2023-11-21 | End: 2023-11-21

## 2023-10-17 RX ORDER — PALONOSETRON 0.05 MG/ML
0.25 INJECTION, SOLUTION INTRAVENOUS ONCE
Start: 2023-10-31 | End: 2023-10-31

## 2023-10-17 RX ORDER — PANTOPRAZOLE SODIUM 40 MG/1
40 TABLET, DELAYED RELEASE ORAL 2 TIMES DAILY
COMMUNITY
Start: 2023-10-13

## 2023-10-17 RX ORDER — SODIUM CHLORIDE 9 MG/ML
1000 INJECTION, SOLUTION INTRAVENOUS ONCE
Status: COMPLETED | OUTPATIENT
Start: 2023-10-17 | End: 2023-10-17

## 2023-10-17 RX ORDER — PALONOSETRON 0.05 MG/ML
0.25 INJECTION, SOLUTION INTRAVENOUS ONCE
Start: 2023-11-14 | End: 2023-11-14

## 2023-10-17 RX ORDER — SODIUM CHLORIDE 9 MG/ML
1000 INJECTION, SOLUTION INTRAVENOUS ONCE
OUTPATIENT
Start: 2023-10-24

## 2023-10-17 RX ORDER — PALONOSETRON 0.05 MG/ML
0.25 INJECTION, SOLUTION INTRAVENOUS ONCE
Start: 2023-11-28 | End: 2023-11-28

## 2023-10-17 RX ORDER — SODIUM CHLORIDE 9 MG/ML
1000 INJECTION, SOLUTION INTRAVENOUS ONCE
OUTPATIENT
Start: 2023-11-14

## 2023-10-17 RX ORDER — SODIUM CHLORIDE 9 MG/ML
1000 INJECTION, SOLUTION INTRAVENOUS ONCE
OUTPATIENT
Start: 2023-11-28

## 2023-10-17 RX ORDER — PALONOSETRON 0.05 MG/ML
0.25 INJECTION, SOLUTION INTRAVENOUS ONCE
Status: CANCELLED
Start: 2023-10-17 | End: 2023-10-17

## 2023-10-17 RX ORDER — PALONOSETRON 0.05 MG/ML
0.25 INJECTION, SOLUTION INTRAVENOUS ONCE
Start: 2023-11-07 | End: 2023-11-07

## 2023-10-17 RX ORDER — SODIUM CHLORIDE 9 MG/ML
1000 INJECTION, SOLUTION INTRAVENOUS ONCE
OUTPATIENT
Start: 2023-11-07

## 2023-10-17 RX ORDER — SODIUM CHLORIDE 9 MG/ML
1000 INJECTION, SOLUTION INTRAVENOUS ONCE
Status: CANCELLED | OUTPATIENT
Start: 2023-10-17

## 2023-10-17 RX ADMIN — SODIUM CHLORIDE 1000 ML: 9 INJECTION, SOLUTION INTRAVENOUS at 16:05:00

## 2023-10-17 RX ADMIN — PALONOSETRON 0.25 MG: 0.05 INJECTION, SOLUTION INTRAVENOUS at 12:28:00

## 2023-10-17 NOTE — PAT NURSING NOTE
Per PAT encounter/MyChart message sent to pt:    PreOp Instructions for [facility] Placement     You are scheduled for: an Interventional Radiology Procedure     Date of Procedure: 10/30/23 Monday; Check in at 9 am     Diet Instructions: Do not eat or drink anything after midnight     Medications: Medications you are allowed to take can be taken with a sip of water the morning of your procedure. Do not take any NSAIDs (ex. Advil, Aleve) the week prior to the procedure. If you need to take something for pain, you can take Tylenol (Acetaminophen) instead as long as you are not restricted from having that medication. Medications to Stop: Hold herbal supplements and vitamins; please do not take for the week before the procedure. Skin Prep: Shower with antibacterial soap using a clean washcloth, prior to procedure    Driving After Procedure: If sedation is given, you WILL NOT be able to drive home. You will need a responsible adult  to drive you home. ;Cannot take uber or cab unless approved by physician     Discharge Teaching: Most people can resume normal activities in 2-3 days; Your nurse will give you specific instructions before discharge; Any questions, please call the physician's office      parking is available starting at 6 am or park in the [address] at [facility]. Follow the signs in the garage to the [facility]Main Entrance. Once inside, go down the hallway on the left that says [address] above it. Check in at the [address] reception desk (about 20-30 feet down the horowitz on the right hand side). Our  will be there to check you in for your procedure. Please bring your insurance cards and ID with you. Please DO NOT respond to this message, the inbasket is not monitored for messages. For any questions, please call the physician's office.

## 2023-10-17 NOTE — PROGRESS NOTES
Pt here for C1D1 Cisplatin. Arrives Ambulating independently, accompanied by Spouse       Oral medications included in this regimen:  no    Patient confirms comprehension of cancer treatment schedule:  yes    Pregnancy screening:  Denies possibility of pregnancy    Modifications in dose or schedule:  No    Medications appearance and physical integrity checked by RN. Chemotherapy IV pump settings verified by 2 RNs:  yes     Frequency of blood return and site check throughout administration: Prior to administration and At completion of therapy     Infusion/treatment outcome:  patient tolerated treatment without incident    Education Record    Learner:  Patient and Spouse  Barriers / Limitations:  None  Method:  Brief focused, Discussion, and Reinforcement  Education / instructions given:  plan of care, home antinausea schedule, medications  Outcome:  Shows understanding    Discharged Home, Ambulating independently, accompanied by:Spouse    Patient/family verbalized understanding of future appointments: by printed AVS     Patient did not tolerate radiation today. Dr. Rona Cabans here to see patient. Patient will start radiation tomorrow. Dr. Sharonda Monet updated on plan of care.

## 2023-10-17 NOTE — PROGRESS NOTES
Oncology Nutrition Consultation    Patient Name: Lazarus Flicker  YOB: 1970  Medical Record Number: [id number]   Account Number: [de-identified]  Dietitian: Kayla Addison RD, GIAN    Date of visit: 10/17/2023    Diet Rx: high protein/calorie, soft as tolerated    Pertinent Dx/PMH: T2N0M0 squamous cell of the larynx     Past Medical History:   Diagnosis Date    Anxiety 12/27/2017    BLOOD DISORDER     anemia    Esophageal reflux     Extrinsic asthma, unspecified     Head and neck cancer ([facility] [address] 10/04/2023    High blood pressure     HTN (hypertension) 9/11/2023    Hx of motion sickness     Irritable bowel syndrome     Migraines     PONV (postoperative nausea and vomiting)     \"violently ill\" at [address] after Anesthesia; UI next month didn't have a problem, Anesthesia said was probably from the gas used at [address] with swallowing     5/3/23 pt states has been limited to soft foods d/t growth in throat; denies difficulty swallowing liquids    Reflux     Ulcer        TX: concurrent cisplatin/RT (thru 12/5/23)    Other pertinent subjective/objective information: noted pt reporting 10-15 lb unplanned wt loss; diet/sx/activity hx obtained    Pertinent Meds:    Current Outpatient Medications:     pantoprazole 40 MG Oral Tab EC, Take 1 tablet (40 mg total) by mouth 2 (two) times daily. , Disp: , Rfl:     OLANZapine 5 MG Oral Tab, Take 1 tablet (5 mg total) by mouth nightly., Disp: 30 tablet, Rfl: 1    ondansetron (ZOFRAN) 8 MG tablet, Take 1 tablet (8 mg total) by mouth every 8 (eight) hours as needed for Nausea., Disp: 30 tablet, Rfl: 3    prochlorperazine (COMPAZINE) 10 mg tablet, Take 1 tablet (10 mg total) by mouth every 6 (six) hours as needed for Nausea., Disp: 30 tablet, Rfl: 3    levoFLOXacin 500 MG Oral Tab, Take 1 tablet (500 mg total) by mouth daily. , Disp: , Rfl:     maalox/diphenhydramine/lidocaine Oral Suspension, Take 5 mL by mouth 4 (four) times daily before meals and nightly. , Disp: 500 mL, Rfl: 1    cyanocobalamin 1000 MCG Oral Tab, Take 1 tablet (1,000 mcg total) by mouth daily. , Disp: , Rfl:     omeprazole 20 MG Oral Capsule Delayed Release, Take 1 capsule (20 mg total) by mouth every morning before breakfast., Disp: , Rfl:     buPROPion HCl ER, Smoking Det, 150 MG Oral Tablet 12 Hr, Take 1 tablet (150 mg total) by mouth 2 (two) times daily. , Disp: , Rfl:     HYDROcodone-acetaminophen  MG Oral Tab, Take 1-2 tablets by mouth every 4 (four) hours as needed for Pain., Disp: 120 tablet, Rfl: 0    lisinopril 20 MG Oral Tab, Take 1 tablet (20 mg total) by mouth daily. , Disp: , Rfl:     MONTELUKAST 10 MG Oral Tab, TAKE 1 TABLET(10 MG) BY MOUTH EVERY NIGHT, Disp: 90 tablet, Rfl: 0    albuterol 108 (90 Base) MCG/ACT Inhalation Aero Soln, Inhale 2 puffs into the lungs every 4 (four) hours as needed. , Disp: 54 g, Rfl: 1    Budesonide-Formoterol Fumarate (SYMBICORT) 160-4.5 MCG/ACT Inhalation Aerosol, INHALE 2 PUFFS BY MOUTH EVERY MORNING, Disp: 1 each, Rfl: 5    fluticasone propionate 50 MCG/ACT Nasal Suspension, SHAKE LIQUID AND USE 2 SPRAYS IN EACH NOSTRIL DAILY, Disp: 48 g, Rfl: 1    ALPRAZolam 0.5 MG Oral Tab, Take 1 tablet (0.5 mg total) by mouth 3 (three) times daily as needed. , Disp: 90 tablet, Rfl: 0    EPINEPHrine 0.3 MG/0.3ML Injection Solution Auto-injector, Inject 0.3 mL (1 each total) as directed one time. Injectf into the muscle one time as needed for anaphylaxis for up to 2 doses, Disp: , Rfl:     benzonatate 200 MG Oral Cap, Take 1 capsule (200 mg total) by mouth 3 (three) times daily as needed for cough. , Disp: 50 capsule, Rfl: 1    Simethicone (GAS-X OR), daily. , Disp: , Rfl:     albuterol (VENTOLIN) (2.5 MG/3ML) 0.083% Inhalation Nebu Soln, Take 3 mL (2.5 mg total) by nebulization every 6 (six) hours as needed. , Disp: , Rfl:     Pertinent Labs: noted    Height: 5'1.65\"            IBW: 110 +/- 10%    WT HX:   Wt Readings from Last 9 Encounters:  10/17/23 : 62.3 kg (137 lb 6.4 oz)  10/05/23 : 63 kg (139 lb)  10/06/23 : 63 kg (139 lb)  10/04/23 : 63.6 kg (140 lb 3.2 oz)  10/04/23 : 63.3 kg (139 lb 9.6 oz)  10/03/23 : 64.4 kg (142 lb)  05/16/23 : 64.9 kg (143 lb)  11/17/21 : 69.9 kg (154 lb)  09/29/21 : 70.3 kg (155 lb)      Estimated Nutrition Needs: 25-30 kcals/kg = 0258-9355 KCALS/d; 1.5 gms protein/kg = 93 gms/d    Services Provided: Verbal and written ix provided addressing -  importance of nutrition during tx; simple high protein/calorie recipes (puddings, etc); high calorie shake recipe    Assessment/Plan: RD met w/ this pleasant, talkative, 49 y/o female and her life partner in tx room for introduction, assessment, and recommendations. Pt noted long h/o swallowing issues as per Vásquez's esophagus and swallow problems thus following a soft diet. Pt noted viewing internet re: nutrition recommendations. Pt noted typically following a Keto diet as well as noting several food likes/dislikes. Pt also noted prior to dx drinking a pot of coffee and diet soda; now only consuming 3 c. Coffee and 1 soda/d. Diet hx revealed regular toast (not low CHO) and cream cheese or +/- eggs/dee/ham or biscuits/gravy or Cheerios for breakfast; sandwich or cheeseburger or tacos and soda for lunch; chicken thighs w/ vegetables or ham/bean soup all homemade for dinner. Pt noted enjoying bananas, berries, avocado, applesauce for fruit. Pt drinks H2O throughout the day. She also noted drinking whatever ONS is on sale that week. Pt noted she is a  and would often log 14,000 steps/d as well as heavy lifting involved. RD reviewed recommendations as noted stressing importance of not only adequate protein intake spread out throughout the day, but also the importance of adequate calories. Noting perhaps as per present health situation she forego Keto diet temporarily and allow more CHO containing foods. Pt agreed noting she enjoys pasta and bread.      RD offered support/encouragement and will continue to work with throughout tx. Thank you for allowing me to participate in the care of Israel Hayward. The [facility] makes medical notes like these available to patients in the interest of transparency. Please be advised this is a medical document. Medical documents are intended to carry relevant information, facts as evident, and the clinical opinion of the practitioner. The medical note is intended as peer to peer communication and may appear blunt or direct. It is written in medical language and may contain abbreviations or verbiage that are unfamiliar.

## 2023-10-17 NOTE — PROGRESS NOTES
Outpatient Oncology Care Plan   Problem list:   fatigue   Problems related to:   side effect of treatment   Interventions:   provided general teaching   Expected outcomes:   understands plan of care   Progress towards outcome: making progress     Education Record   Learner: Patient   Barriers / Limitations: None   Method: Discussion   Outcome: Shows understanding   Comments:  Patient here for follow-up and planned C1D1 treatment. Will start radiation today as well. Still having nausea. Has not started olanzapine yet. Will  today. Palliative notified to assist with pain management.

## 2023-10-17 NOTE — PATIENT INSTRUCTIONS
Zofran (Ondansetron) every 8 hours as needed. Compazine (Prochlorperazine) every 6 hours as needed. Alternate between Zofran and Compazine every 4 hours for the first 48-72 hours. Wean off, but continue taking as needed. Take compazine if needed when you arrive home.  Then as follows:    9/10pm - zofran  (Compazine during the night if needed)  6am - zofran  10am - compazine  2pm - zofran  6pm - compazine  10pm - zofran

## 2023-10-17 NOTE — PROGRESS NOTES
met with patient and Life Partner Denia Bates in treatment room for introduction and role explanation. Patient scored 10 on Distress Screening, with Emotional, Physical, Spiritual, Social, and Practical concerns. Patient shared that she is anxious and overwhelmed; support and encouragement provided. Patient lives in [place], [place], alone. Her Significant Other has been staying with her most days to assist with care. She reports that her Son Yobani Weiss is supportive, but lives in [place]. She has 3 close friends who are going to be coming to assist her with day to day care on a regular basis as well. She works as a , and has a close community of support around her, with very understanding employers. Patient does not have any STD/LTD/FMLA benefits with her work, but has already applied for [employer]. She has obtained SNAP Benefit already. She states that she mailed her Application in on 40/25/88, but has not heard from the  assigned to her (Linda Braden, [id number], QWG#[id number]).  called and left a message to  informing that Dr Narciso Villela is currently treating patient at [place] [address], offering to send any information needed. Requested a call back. Patient states financial concern if she is not working and does not have SSD income. She is on Medicaid and has not had an issue with any medical bills so far. She states that her Shanda Stai is inconsistent with his understanding of her situation.  educated on [employer]TIM[employer] for [employer], providing information for open enrollment to the Program 12/1/23.  will also look for other financial assistance that can benefit patient moving forward. Social Determinants of Health assessment completed with patient and no needs identified at this time.  educated on Power of  for [employer];  Patient stated that one has already been completed and will submit to Cancer Center to keep on file. She is aware to reach out if she wants to complete a new one as well. Educated on available resources, providing Social Work Support Packet including [address], [facility], Transportation, and [address] contacts. Educated on B[facility] if desired. Contact provided and family is aware to reach out with any needs. Bringing Chantel Bag given, which patient was grateful for.

## 2023-10-18 ENCOUNTER — HOSPITAL ENCOUNTER (OUTPATIENT)
Dept: RADIATION ONCOLOGY | Facility: HOSPITAL | Age: 53
Discharge: HOME | End: 2023-10-18
Attending: RADIOLOGY
Payer: MEDICAID

## 2023-10-18 ENCOUNTER — TELEPHONE (OUTPATIENT)
Dept: HEMATOLOGY/ONCOLOGY | Facility: HOSPITAL | Age: 53
End: 2023-10-18

## 2023-10-18 NOTE — TELEPHONE ENCOUNTER
Spoke with patient regarding her pain and anxiety. She has used xanax for many years prescribed by her PCP for her anxiety. She typically uses 21/2 pills daily with benefit. Taking an additional 1/2 pill prior to RT was helpful so she will continue this. She is currently experiencing nausea with norco 10mg tablet so she is using 1/2 tablet (5mg) every 2 hrs with benefit for pain and no nausea. She feels this plan is effective for her currently and doesn't want to change it. She can continue using norco 5mg (cutting 10mg tab in half) every 2 hrs. Most likely will need to change plan as she gets further into treatment plan. Discussed option of long acting opioid or changing opioid in future to optimize pain. She will consider this as needed if pain worsens. She will follow up next week.   She is good with current plan

## 2023-10-18 NOTE — TELEPHONE ENCOUNTER
Toxicities: C1 D1 Cisplatin with RT on 10/17/2023    Tanna Panda reports that she is managing her nausea by taking olanzapine at bedtime and alternating her zofran and compazine every 4 hrs. She is also using magic mouthwash so she can eat. She is still having a lot of pain because she is only able to take 1/2 a Norco  because he makes her so nauseated. She asked if RAIN Rao could change her pain medication? I told her I would update Dr Michael Verdugo, Dr Jovanny Shanks and RAIN Heaton now. Tanna Panda is asking for a call back from [place], [place]. She will be home from the grocery store in 1 hr.

## 2023-10-19 ENCOUNTER — HOSPITAL ENCOUNTER (OUTPATIENT)
Dept: RADIATION ONCOLOGY | Facility: HOSPITAL | Age: 53
Discharge: HOME | End: 2023-10-19
Attending: RADIOLOGY
Payer: MEDICAID

## 2023-10-19 VITALS
SYSTOLIC BLOOD PRESSURE: 111 MMHG | DIASTOLIC BLOOD PRESSURE: 81 MMHG | TEMPERATURE: 99 F | BODY MASS INDEX: 26 KG/M2 | WEIGHT: 139.63 LBS | HEART RATE: 90 BPM | OXYGEN SATURATION: 97 % | RESPIRATION RATE: 20 BRPM

## 2023-10-19 DIAGNOSIS — C32.8 MALIGNANT NEOPLASM OF OVERLAPPING SITES OF LARYNX (HCC): Primary | ICD-10-CM

## 2023-10-23 ENCOUNTER — TELEPHONE (OUTPATIENT)
Dept: HEMATOLOGY/ONCOLOGY | Facility: HOSPITAL | Age: 53
End: 2023-10-23

## 2023-10-23 ENCOUNTER — OFFICE VISIT (OUTPATIENT)
Dept: HEMATOLOGY/ONCOLOGY | Facility: HOSPITAL | Age: 53
End: 2023-10-23
Attending: INTERNAL MEDICINE
Payer: MEDICAID

## 2023-10-23 VITALS
HEART RATE: 130 BPM | TEMPERATURE: 100 F | OXYGEN SATURATION: 97 % | BODY MASS INDEX: 25.65 KG/M2 | SYSTOLIC BLOOD PRESSURE: 92 MMHG | DIASTOLIC BLOOD PRESSURE: 65 MMHG | RESPIRATION RATE: 18 BRPM | WEIGHT: 139.38 LBS | HEIGHT: 61.65 IN

## 2023-10-23 DIAGNOSIS — Z51.5 PALLIATIVE CARE BY SPECIALIST: ICD-10-CM

## 2023-10-23 DIAGNOSIS — C76.0 HEAD AND NECK CANCER (HCC): ICD-10-CM

## 2023-10-23 DIAGNOSIS — K59.03 DRUG-INDUCED CONSTIPATION: ICD-10-CM

## 2023-10-23 DIAGNOSIS — F41.9 ANXIETY: ICD-10-CM

## 2023-10-23 DIAGNOSIS — G89.3 NEOPLASM RELATED PAIN: Primary | ICD-10-CM

## 2023-10-23 RX ORDER — HYDROCODONE BITARTRATE AND ACETAMINOPHEN 10; 325 MG/1; MG/1
TABLET ORAL EVERY 4 HOURS PRN
Qty: 60 TABLET | Refills: 0 | Status: SHIPPED | OUTPATIENT
Start: 2023-10-23 | End: 2023-10-23

## 2023-10-23 RX ORDER — HYDROCODONE BITARTRATE AND ACETAMINOPHEN 5; 325 MG/1; MG/1
1 TABLET ORAL EVERY 4 HOURS PRN
Qty: 90 TABLET | Refills: 0 | Status: SHIPPED | OUTPATIENT
Start: 2023-10-23

## 2023-10-23 RX ORDER — CYCLOBENZAPRINE HCL 5 MG
5 TABLET ORAL 3 TIMES DAILY PRN
Qty: 30 TABLET | Refills: 0 | Status: SHIPPED | OUTPATIENT
Start: 2023-10-23

## 2023-10-23 RX ORDER — BENZONATATE 100 MG/1
100 CAPSULE ORAL 3 TIMES DAILY PRN
Qty: 60 CAPSULE | Refills: 1 | Status: SHIPPED | OUTPATIENT
Start: 2023-10-23

## 2023-10-23 RX ORDER — SENNOSIDES 8.6 MG
8.6 TABLET ORAL DAILY
Qty: 60 TABLET | Refills: 0 | Status: SHIPPED | OUTPATIENT
Start: 2023-10-23

## 2023-10-23 NOTE — TELEPHONE ENCOUNTER
Patient at pharmacy and they do no have Norco 10/325 mg tablets. Only 5/325 mg tablets. She is requesting a new script sent asap.

## 2023-10-23 NOTE — TELEPHONE ENCOUNTER
Keagan: Lilia Mac need clarification on order HYDROcodone-acetaminophen 5-325 MG Oral Tab Take 1 tablet by mouth every 4 (four) hours as needed for Pain., Normal, Disp-90 tablet, R-0 G89.3 neoplasm pain ( need order to dispense  medication  needs a call back because its over 120 tablets) [id number] [place] - [address] [address] [address], [phone], [phone]  Thanks Bon bejarano

## 2023-10-24 ENCOUNTER — SOCIAL WORK SERVICES (OUTPATIENT)
Dept: HEMATOLOGY/ONCOLOGY | Facility: HOSPITAL | Age: 53
End: 2023-10-24

## 2023-10-24 ENCOUNTER — OFFICE VISIT (OUTPATIENT)
Dept: HEMATOLOGY/ONCOLOGY | Facility: HOSPITAL | Age: 53
End: 2023-10-24
Attending: INTERNAL MEDICINE
Payer: MEDICAID

## 2023-10-24 VITALS
TEMPERATURE: 98 F | DIASTOLIC BLOOD PRESSURE: 71 MMHG | HEART RATE: 101 BPM | OXYGEN SATURATION: 98 % | SYSTOLIC BLOOD PRESSURE: 100 MMHG | WEIGHT: 139.19 LBS | BODY MASS INDEX: 26 KG/M2

## 2023-10-24 DIAGNOSIS — C76.0 HEAD AND NECK CANCER (HCC): Primary | ICD-10-CM

## 2023-10-24 DIAGNOSIS — F11.90 CHRONIC, CONTINUOUS USE OF OPIOIDS: ICD-10-CM

## 2023-10-24 DIAGNOSIS — R11.0 CHEMOTHERAPY-INDUCED NAUSEA: ICD-10-CM

## 2023-10-24 DIAGNOSIS — K59.03 DRUG-INDUCED CONSTIPATION: ICD-10-CM

## 2023-10-24 DIAGNOSIS — C32.8 MALIGNANT NEOPLASM OF OVERLAPPING SITES OF LARYNX (HCC): ICD-10-CM

## 2023-10-24 DIAGNOSIS — G89.3 NEOPLASM RELATED PAIN: ICD-10-CM

## 2023-10-24 DIAGNOSIS — T45.1X5A CHEMOTHERAPY-INDUCED NAUSEA: ICD-10-CM

## 2023-10-24 LAB
ANION GAP SERPL CALC-SCNC: 6 MMOL/L (ref 0–18)
BASOPHILS # BLD AUTO: 0.04 X10(3) UL (ref 0–0.2)
BASOPHILS NFR BLD AUTO: 0.6 %
BUN BLD-MCNC: 10 MG/DL (ref 7–18)
CALCIUM BLD-MCNC: 9.6 MG/DL (ref 8.5–10.1)
CHLORIDE SERPL-SCNC: 105 MMOL/L (ref 98–112)
CO2 SERPL-SCNC: 25 MMOL/L (ref 21–32)
CREAT BLD-MCNC: 0.78 MG/DL
EGFRCR SERPLBLD CKD-EPI 2021: 91 ML/MIN/1.73M2 (ref 60–?)
EOSINOPHIL # BLD AUTO: 0.13 X10(3) UL (ref 0–0.7)
EOSINOPHIL NFR BLD AUTO: 1.9 %
ERYTHROCYTE [DISTWIDTH] IN BLOOD BY AUTOMATED COUNT: 12 %
FASTING STATUS PATIENT QL REPORTED: NO
GLUCOSE BLD-MCNC: 105 MG/DL (ref 70–99)
HCT VFR BLD AUTO: 38.3 %
HGB BLD-MCNC: 13 G/DL
IMM GRANULOCYTES # BLD AUTO: 0.02 X10(3) UL (ref 0–1)
IMM GRANULOCYTES NFR BLD: 0.3 %
LYMPHOCYTES # BLD AUTO: 1.24 X10(3) UL (ref 1–4)
LYMPHOCYTES NFR BLD AUTO: 18.4 %
MCH RBC QN AUTO: 31.1 PG (ref 26–34)
MCHC RBC AUTO-ENTMCNC: 33.9 G/DL (ref 31–37)
MCV RBC AUTO: 91.6 FL
MONOCYTES # BLD AUTO: 0.42 X10(3) UL (ref 0.1–1)
MONOCYTES NFR BLD AUTO: 6.2 %
NEUTROPHILS # BLD AUTO: 4.89 X10 (3) UL (ref 1.5–7.7)
NEUTROPHILS # BLD AUTO: 4.89 X10(3) UL (ref 1.5–7.7)
NEUTROPHILS NFR BLD AUTO: 72.6 %
OSMOLALITY SERPL CALC.SUM OF ELEC: 281 MOSM/KG (ref 275–295)
PLATELET # BLD AUTO: 343 10(3)UL (ref 150–450)
POTASSIUM SERPL-SCNC: 4 MMOL/L (ref 3.5–5.1)
RBC # BLD AUTO: 4.18 X10(6)UL
SODIUM SERPL-SCNC: 136 MMOL/L (ref 136–145)
WBC # BLD AUTO: 6.7 X10(3) UL (ref 4–11)

## 2023-10-24 PROCEDURE — 96376 TX/PRO/DX INJ SAME DRUG ADON: CPT

## 2023-10-24 PROCEDURE — 96367 TX/PROPH/DG ADDL SEQ IV INF: CPT

## 2023-10-24 PROCEDURE — 96366 THER/PROPH/DIAG IV INF ADDON: CPT

## 2023-10-24 PROCEDURE — 96375 TX/PRO/DX INJ NEW DRUG ADDON: CPT

## 2023-10-24 PROCEDURE — 96413 CHEMO IV INFUSION 1 HR: CPT

## 2023-10-24 PROCEDURE — 96361 HYDRATE IV INFUSION ADD-ON: CPT

## 2023-10-24 PROCEDURE — 80307 DRUG TEST PRSMV CHEM ANLYZR: CPT

## 2023-10-24 PROCEDURE — 99215 OFFICE O/P EST HI 40 MIN: CPT | Performed by: NURSE PRACTITIONER

## 2023-10-24 RX ORDER — SODIUM CHLORIDE 9 MG/ML
1000 INJECTION, SOLUTION INTRAVENOUS ONCE
Status: COMPLETED | OUTPATIENT
Start: 2023-10-24 | End: 2023-10-24

## 2023-10-24 RX ORDER — PALONOSETRON 0.05 MG/ML
0.25 INJECTION, SOLUTION INTRAVENOUS ONCE
Status: COMPLETED | OUTPATIENT
Start: 2023-10-24 | End: 2023-10-24

## 2023-10-24 RX ADMIN — PALONOSETRON 0.25 MG: 0.05 INJECTION, SOLUTION INTRAVENOUS at 09:41:00

## 2023-10-24 RX ADMIN — SODIUM CHLORIDE 1000 ML: 9 INJECTION, SOLUTION INTRAVENOUS at 14:28:00

## 2023-10-24 NOTE — PROGRESS NOTES
Oncology Nutrition F/UConsultation     Patient Name: Rashmi Mancilla  YOB: 1970  Medical Record Number: [id number]            Account Number: [de-identified]  Dietitian: Fallon Morris RD, BHARATIN     Date of visit: 10/24/2023     Diet Rx: high protein/calorie, soft as tolerated     Pertinent Dx/PMH: T2N0M0 squamous cell of the larynx           Past Medical History:   Diagnosis Date    Anxiety 12/27/2017    BLOOD DISORDER       anemia    Esophageal reflux      Extrinsic asthma, unspecified      Head and neck cancer ([facility] [address] 10/04/2023    High blood pressure      HTN (hypertension) 9/11/2023    Hx of motion sickness      Irritable bowel syndrome      Migraines      PONV (postoperative nausea and vomiting)       \"violently ill\" at [address] after Anesthesia; UI next month didn't have a problem, Anesthesia said was probably from the gas used at [address] with swallowing       5/3/23 pt states has been limited to soft foods d/t growth in throat; denies difficulty swallowing liquids    Reflux      Ulcer           TX: concurrent cisplatin/RT (thru 12/5/23)     Other pertinent subjective/objective information: noted pt reporting 10-15 lb unplanned wt loss; diet/sx/activity hx obtained     Pertinent Meds:     Current Outpatient Medications:     pantoprazole 40 MG Oral Tab EC, Take 1 tablet (40 mg total) by mouth 2 (two) times daily. , Disp: , Rfl:     OLANZapine 5 MG Oral Tab, Take 1 tablet (5 mg total) by mouth nightly., Disp: 30 tablet, Rfl: 1    ondansetron (ZOFRAN) 8 MG tablet, Take 1 tablet (8 mg total) by mouth every 8 (eight) hours as needed for Nausea., Disp: 30 tablet, Rfl: 3    prochlorperazine (COMPAZINE) 10 mg tablet, Take 1 tablet (10 mg total) by mouth every 6 (six) hours as needed for Nausea., Disp: 30 tablet, Rfl: 3    levoFLOXacin 500 MG Oral Tab, Take 1 tablet (500 mg total) by mouth daily. , Disp: , Rfl:     maalox/diphenhydramine/lidocaine Oral Suspension, Take 5 mL by mouth 4 (four) times daily before meals and nightly., Disp: 500 mL, Rfl: 1    cyanocobalamin 1000 MCG Oral Tab, Take 1 tablet (1,000 mcg total) by mouth daily. , Disp: , Rfl:     omeprazole 20 MG Oral Capsule Delayed Release, Take 1 capsule (20 mg total) by mouth every morning before breakfast., Disp: , Rfl:     buPROPion HCl ER, Smoking Det, 150 MG Oral Tablet 12 Hr, Take 1 tablet (150 mg total) by mouth 2 (two) times daily. , Disp: , Rfl:     HYDROcodone-acetaminophen  MG Oral Tab, Take 1-2 tablets by mouth every 4 (four) hours as needed for Pain., Disp: 120 tablet, Rfl: 0    lisinopril 20 MG Oral Tab, Take 1 tablet (20 mg total) by mouth daily. , Disp: , Rfl:     MONTELUKAST 10 MG Oral Tab, TAKE 1 TABLET(10 MG) BY MOUTH EVERY NIGHT, Disp: 90 tablet, Rfl: 0    albuterol 108 (90 Base) MCG/ACT Inhalation Aero Soln, Inhale 2 puffs into the lungs every 4 (four) hours as needed. , Disp: 54 g, Rfl: 1    Budesonide-Formoterol Fumarate (SYMBICORT) 160-4.5 MCG/ACT Inhalation Aerosol, INHALE 2 PUFFS BY MOUTH EVERY MORNING, Disp: 1 each, Rfl: 5    fluticasone propionate 50 MCG/ACT Nasal Suspension, SHAKE LIQUID AND USE 2 SPRAYS IN EACH NOSTRIL DAILY, Disp: 48 g, Rfl: 1    ALPRAZolam 0.5 MG Oral Tab, Take 1 tablet (0.5 mg total) by mouth 3 (three) times daily as needed. , Disp: 90 tablet, Rfl: 0    EPINEPHrine 0.3 MG/0.3ML Injection Solution Auto-injector, Inject 0.3 mL (1 each total) as directed one time. Injectf into the muscle one time as needed for anaphylaxis for up to 2 doses, Disp: , Rfl:     benzonatate 200 MG Oral Cap, Take 1 capsule (200 mg total) by mouth 3 (three) times daily as needed for cough. , Disp: 50 capsule, Rfl: 1    Simethicone (GAS-X OR), daily. , Disp: , Rfl:     albuterol (VENTOLIN) (2.5 MG/3ML) 0.083% Inhalation Nebu Soln, Take 3 mL (2.5 mg total) by nebulization every 6 (six) hours as needed. , Disp: , Rfl:      Pertinent Labs: noted     Height: 5'1.65\"                     IBW: 110 +/- 10%     WT HX:   10/24/23: 63.1 kg (139 lb 3.2 oz)  10/17/23 : 62.3 kg (137 lb 6.4 oz)  10/05/23 : 63 kg (139 lb)  10/06/23 : 63 kg (139 lb)  10/04/23 : 63.6 kg (140 lb 3.2 oz)  10/04/23 : 63.3 kg (139 lb 9.6 oz)  10/03/23 : 64.4 kg (142 lb)  05/16/23 : 64.9 kg (143 lb)  11/17/21 : 69.9 kg (154 lb)  09/29/21 : 70.3 kg (155 lb)        Estimated Nutrition Needs: 25-30 kcals/kg = 6011-7858 KCALS/d; 1.5 gms protein/kg = 93 gms/d      Assessment/Plan: RD f/u w/ pt and life partner in tx room as f/u to initial chemotherapy and tolerance to RT. Pt noted maintaining food log via smartphone noted consuming 3500 kcals in 1 day. Pt adding oatmeal cookies, cereal, sandwiches in addition to what she had been consuming when we last spoke. However, pt noted poor po intake days prior as per c/o nausea. Pt c/o significant pain pointing to right side of neck, shoulder and ear. She noted working w/ palliative care NP for Norco dosing, marijuana gummies. Pt noted pain now also on left side. RD noted would alert NP. Regarding nutrition, RD noted it being OK to eat more on 1 day especially w/ several days of poor po intake as per noted c/o. Pt expressed concern re: potential for diabetes, elevated cholesterol, etc. RD offered support. RD will continue to work with throughout tx. The [facility] makes medical notes like these available to patients in the interest of transparency. Please be advised this is a medical document. Medical documents are intended to carry relevant information, facts as evident, and the clinical opinion of the practitioner. The medical note is intended as peer to peer communication and may appear blunt or direct. It is written in medical language and may contain abbreviations or verbiage that are unfamiliar.

## 2023-10-24 NOTE — PROGRESS NOTES
Pt here for Day 8 and treatment. Pt states she is drinking adequate fluids. She complains of neck pain. She complains of nausea. No vomiting.

## 2023-10-24 NOTE — PROGRESS NOTES
met with patient who inquired about [employer] Application. Application provided to patient with MD portion completed, and  completed letter of diagnosis/treatment. Patient completed Application during treatment, providing to , who faxed to [address] F# [phone]     assisted in completing Power of  for Healthcare where patient named Partner Talisha Aviles as Primary Agent; copies made and handed to family and given to Brodie Lomeli to upload into their chart. Letter:   Josephine Cannonon ( 1970) is a patient under my care at [facility] for her diagnosis of Head and Neck Cancer - Squamous Cell Carcinoma of Larynx. She is actively undergoing Chemotherapy and Radiation therapies.

## 2023-10-24 NOTE — TELEPHONE ENCOUNTER
Spoke with pharmacy. Norco 10 on back order. Asking for norco 5 to be sent ASAP. Palliative APN notified to send new script.

## 2023-10-24 NOTE — PROGRESS NOTES
Pt here for C1D8 Drug(s)Cisplatin. Arrives Ambulating independently, accompanied by Self and Family member     Patient was evaluated today by SHAHRIAR. Oral medications included in this regimen:  no    Patient confirms comprehension of cancer treatment schedule:  yes    Pregnancy screening:  Denies possibility of pregnancy    Modifications in dose or schedule:  No    Medications appearance and physical integrity checked by RN: yes. Chemotherapy IV pump settings verified by 2 RNs:  Yes. Frequency of blood return and site check throughout administration: Prior to administration     Infusion/treatment outcome:  patient tolerated treatment without incident    Education Record    Learner:  Patient  Barriers / Limitations:  None  Method:  Discussion  Education / instructions given:  Scheduling  Outcome:  Shows understanding    Discharged Home, Ambulating independently, accompanied by:Self    Patient/family verbalized understanding of future appointments: by printed AVS    Pt tolerated cisplatin without incident. Left in stable condition. Maira had sent pain med prescriptions down to the [facility] in 1 week for chemo - daily radiation.

## 2023-10-24 NOTE — PROGRESS NOTES
Palliative Care Follow Up Note     Patient Name: Justice Maxwell   YOB: 1970   Medical Record Number: [id number]   CSN: [id number]   Date of visit: 10/23/2023     Chief Complaint/Reason for Visit:  Follow up visit for symptoms     History of Present Illness:         Justice Maxwell is a 48year old female with larynx cancer receiving chemo/RT. She complains of R ear, jaw, cheek and throat pain. Its persistent and varies in intensity. The pain is deep achy stabbing pain. This pain affects sleep and its painful to swallow. She complains of thick saliva and coughs a lot. She feels the benzonatate is helpful for this. She is using 1/2 norco tab every 2-3 hrs most helpful to control pain. Taking a full dose of 10mg causes nausea. She has used 3 doses since yesterday. She is using an extra xanax prior to RT with benefit. She has used xanax for many years with benefit. She is struggling with constipation and is using colace without much benefit. She recently picked up some miralax to use to help with constipation. She feels this current plan is working. .        Problem List:  Patient Active Problem List:     Asthma     Deviated nasal septum     GERD (gastroesophageal reflux disease)     Tobacco abuse     Fibroadenoma     Verruca     Sinusitis     Knee contusion     Esophageal spasm     Iron deficiency anemia due to chronic blood loss     Sinusitis, acute     Subacute pansinusitis     Iron deficiency     Mass of breast, right     Breast tenderness     Chronic bronchitis (HCC)     Persistent cough     Fatigue, unspecified type     Menorrhagia with regular cycle     Anxiety     Epiglottic lesion     Seasonal allergies     Sore throat     Head and neck cancer ([address]     Malignant neoplasm of overlapping sites of larynx [facility])     Palliative care by specialist     HTN (hypertension)     Medical History:  Past Medical History:   Diagnosis Date    Anxiety 12/27/2017    BLOOD DISORDER anemia    Esophageal reflux     Extrinsic asthma, unspecified     Head and neck cancer ([facility] [address] 10/04/2023    High blood pressure     HTN (hypertension) 9/11/2023    Hx of motion sickness     Irritable bowel syndrome     Migraines     PONV (postoperative nausea and vomiting)     \"violently ill\" at [facility] [place] after Anesthesia; UI next month didn't have a problem, Anesthesia said was probably from the gas used at [address] [employer] [place] with swallowing     5/3/23 pt states has been limited to soft foods d/t growth in throat; denies difficulty swallowing liquids    Reflux     Ulcer      Surgical History:  Past Surgical History:   Procedure Laterality Date    BENIGN BIOPSY RIGHT  05/22/2013    FA    ENDOMETRIAL ABLATION      EXCISION TURBINATE,SUBMUCOUS  08/14/2013    Procedure: ADENOIDECTOMY;  Surgeon: Christine Winn MD;  Location: [address]  08/14/2013    Procedure: ADENOIDECTOMY;  Surgeon: Christine Winn MD;  Location: [address] [place] [address]      CS, esphogel polypectomy x2    OTHER SURGICAL HISTORY      liposuction    REMOVAL ADENOIDS,PRIMARY,12+ Y/O  08/14/2013    Procedure: ENDOSCOPIC SUBMUCOUS RESECTION INFERIOR TURBINATES;  Surgeon: Christine Winn MD;  Location: [address] [place] [address] [place] OF NASAL SEPTUM  08/14/2013    Procedure: SEPTOPLASTY NASAL;  Surgeon: Christine Winn MD;  Location: [address] [place] [address]       Allergies:    Latex                   RASH, OTHER (SEE COMMENTS)  Ceftin [Cefuroxime]     RASH  Codeine                   Oxycodone                 Vicodin [Hydrocodon*    OTHER (SEE COMMENTS)    Comment:Per patient allergy was noted 20 years ago,             uncofirmed if true allergy  Clindamycin             RASH  Penicillins             RASH    Palliative Care Social History:    Marital Status:  she is . Gorge Bermeo is her support person    Functional History:    ADLs: Independent. She works part-time as a     Medications:  Current Outpatient Medications   Medication Sig Dispense Refill    cyclobenzaprine 5 MG Oral Tab Take 1 tablet (5 mg total) by mouth 3 (three) times daily as needed for Muscle spasms. 30 tablet 0    sennosides 8.6 MG Oral Tab Take 1 tablet (8.6 mg total) by mouth daily. 60 tablet 0    benzonatate 100 MG Oral Cap Take 1 capsule (100 mg total) by mouth 3 (three) times daily as needed for cough. 60 capsule 1    HYDROcodone-acetaminophen 5-325 MG Oral Tab Take 1 tablet by mouth every 4 (four) hours as needed for Pain. 90 tablet 0    pantoprazole 40 MG Oral Tab EC Take 1 tablet (40 mg total) by mouth 2 (two) times daily. ALPRAZOLAM 0.5 MG Oral Tab Take 1 tab po 30 minute before radiation daily 30 tablet 0    OLANZapine 5 MG Oral Tab Take 1 tablet (5 mg total) by mouth nightly. (Patient not taking: Reported on 10/17/2023) 30 tablet 1    ondansetron (ZOFRAN) 8 MG tablet Take 1 tablet (8 mg total) by mouth every 8 (eight) hours as needed for Nausea. 30 tablet 3    prochlorperazine (COMPAZINE) 10 mg tablet Take 1 tablet (10 mg total) by mouth every 6 (six) hours as needed for Nausea. 30 tablet 3    maalox/diphenhydramine/lidocaine Oral Suspension Take 5 mL by mouth 4 (four) times daily before meals and nightly. 500 mL 1    cyanocobalamin 1000 MCG Oral Tab Take 1 tablet (1,000 mcg total) by mouth daily. buPROPion HCl ER, Smoking Det, 150 MG Oral Tablet 12 Hr Take 1 tablet (150 mg total) by mouth 2 (two) times daily. lisinopril 20 MG Oral Tab Take 1 tablet (20 mg total) by mouth daily. MONTELUKAST 10 MG Oral Tab TAKE 1 TABLET(10 MG) BY MOUTH EVERY NIGHT 90 tablet 0    albuterol 108 (90 Base) MCG/ACT Inhalation Aero Soln Inhale 2 puffs into the lungs every 4 (four) hours as needed.  54 g 1    Budesonide-Formoterol Fumarate (SYMBICORT) 160-4.5 MCG/ACT Inhalation Aerosol INHALE 2 PUFFS BY MOUTH EVERY MORNING 1 each 5    fluticasone propionate 50 MCG/ACT Nasal Suspension SHAKE LIQUID AND USE 2 SPRAYS IN EACH NOSTRIL DAILY 48 g 1    EPINEPHrine 0.3 MG/0.3ML Injection Solution Auto-injector Inject 0.3 mL (1 each total) as directed one time. Injectf into the muscle one time as needed for anaphylaxis for up to 2 doses (Patient not taking: Reported on 10/17/2023)      Simethicone (GAS-X OR) daily. (Patient not taking: Reported on 10/17/2023)         Review of Systems:  General:  Fatigue. Feels well. Respiratory:  Denies SOB, denies cough  Cardiac:  Denies chest pain, heart palpitations  Abdomen:  Denies constipation, diarrhea. Denies pain. Psych:  No complaints. Sleeping well    Palliative Performance Scale:  100 %    Physical Examination:  General: Patient is alert and oriented, not in acute distress. Respiratory: Normal excursions and effort  Cardiac:   No edema  Abdomen: Soft, non tender   Musculoskeletal: Normal gait. Psych:  Mood/Affect appropriate    Advanced Directives Discussed and Completed:     HCPOA/Health Surrogate: There is no completed HCPOA documentation on file in Psychiatric. Pain was focus today    Palliative Care:  She can continue norco every 3-4 hrs for now. She doesn't want tochange since this is working and she is afraid of nausea. Most likely will need to change as treatment progresses and potential for worsening pain due to RT will increase  Discussed constipation prophylaxis. Discussed strategies to manage anxiety    Impression/Plan:   1. Neoplasm related pain  Norco 5mg Q 3 prn    2. Constipation  Miralax daily prn  Senna daily prn    3. Anxiety  Xanax 0.25mg TID, 0.5mg Q HS      Planned Follow up: No follow-ups on file. Encounter Times  Reviewing/Obtaining:    minutes    Medical Exam:   minutes      Plan: 5  minutes    Notes: 5 minutes      Counseling/Education: 30 minutes    Care Coordination:  minutes      My total time spent caring for the patient on the day of the encounter: 40 minutes.         [employer] makes medical notes like these available to patients in the interest of transparency. Please be advised this is a medical document. Medical documents are intended to carry relevant information, facts as evident, and the clinical opinion of the practitioner. The medical note is intended as peer to peer communication and may appear blunt or direct. It is written in medical language and may contain abbreviations or verbiage that are unfamiliar.         Electronically Signed by:  RAIN Diggs Outpatient Palliative Nurse Practitioner

## 2023-10-26 ENCOUNTER — HOSPITAL ENCOUNTER (OUTPATIENT)
Dept: RADIATION ONCOLOGY | Facility: HOSPITAL | Age: 53
Discharge: HOME | End: 2023-10-26
Attending: RADIOLOGY
Payer: MEDICAID

## 2023-10-26 VITALS
WEIGHT: 141.38 LBS | TEMPERATURE: 99 F | DIASTOLIC BLOOD PRESSURE: 82 MMHG | RESPIRATION RATE: 20 BRPM | OXYGEN SATURATION: 99 % | BODY MASS INDEX: 26 KG/M2 | HEART RATE: 94 BPM | SYSTOLIC BLOOD PRESSURE: 128 MMHG

## 2023-10-26 DIAGNOSIS — C32.8 MALIGNANT NEOPLASM OF OVERLAPPING SITES OF LARYNX (HCC): Primary | ICD-10-CM

## 2023-10-26 RX ORDER — MOMETASONE FUROATE 1 MG/G
CREAM TOPICAL
Qty: 45 G | Refills: 1 | Status: SHIPPED | OUTPATIENT
Start: 2023-10-26

## 2023-10-26 RX ORDER — CODEINE PHOSPHATE AND GUAIFENESIN 10; 100 MG/5ML; MG/5ML
10 SOLUTION ORAL EVERY 6 HOURS PRN
COMMUNITY
Start: 2023-10-25

## 2023-10-30 ENCOUNTER — HOSPITAL ENCOUNTER (OUTPATIENT)
Dept: INTERVENTIONAL RADIOLOGY/VASCULAR | Facility: HOSPITAL | Age: 53
Discharge: HOME | End: 2023-10-30
Attending: INTERNAL MEDICINE | Admitting: INTERNAL MEDICINE
Payer: MEDICAID

## 2023-10-30 VITALS
RESPIRATION RATE: 19 BRPM | WEIGHT: 139 LBS | OXYGEN SATURATION: 97 % | TEMPERATURE: 97 F | HEART RATE: 104 BPM | BODY MASS INDEX: 25.58 KG/M2 | SYSTOLIC BLOOD PRESSURE: 105 MMHG | DIASTOLIC BLOOD PRESSURE: 73 MMHG | HEIGHT: 62 IN

## 2023-10-30 DIAGNOSIS — C76.0 HEAD AND NECK CANCER (HCC): ICD-10-CM

## 2023-10-30 LAB — INR: 1 (ref 0.8–1.3)

## 2023-10-30 PROCEDURE — 85610 PROTHROMBIN TIME: CPT | Performed by: RADIOLOGY

## 2023-10-30 PROCEDURE — 99153 MOD SED SAME PHYS/QHP EA: CPT | Performed by: RADIOLOGY

## 2023-10-30 PROCEDURE — 77001 FLUOROGUIDE FOR VEIN DEVICE: CPT | Performed by: RADIOLOGY

## 2023-10-30 PROCEDURE — 36561 INSERT TUNNELED CV CATH: CPT | Performed by: RADIOLOGY

## 2023-10-30 PROCEDURE — 02HV33Z INSERTION OF INFUSION DEVICE INTO SUPERIOR VENA CAVA, PERCUTANEOUS APPROACH: ICD-10-PCS | Performed by: RADIOLOGY

## 2023-10-30 PROCEDURE — 99152 MOD SED SAME PHYS/QHP 5/>YRS: CPT | Performed by: RADIOLOGY

## 2023-10-30 PROCEDURE — 76937 US GUIDE VASCULAR ACCESS: CPT | Performed by: RADIOLOGY

## 2023-10-30 PROCEDURE — 0JH60XZ INSERTION OF TUNNELED VASCULAR ACCESS DEVICE INTO CHEST SUBCUTANEOUS TISSUE AND FASCIA, OPEN APPROACH: ICD-10-PCS | Performed by: RADIOLOGY

## 2023-10-30 RX ORDER — ONDANSETRON 2 MG/ML
INJECTION INTRAMUSCULAR; INTRAVENOUS
Status: COMPLETED
Start: 2023-10-30 | End: 2023-10-30

## 2023-10-30 RX ORDER — LIDOCAINE HYDROCHLORIDE AND EPINEPHRINE BITARTRATE 20; .01 MG/ML; MG/ML
INJECTION, SOLUTION SUBCUTANEOUS
Status: COMPLETED
Start: 2023-10-30 | End: 2023-10-30

## 2023-10-30 RX ORDER — VANCOMYCIN HYDROCHLORIDE 1 G/20ML
INJECTION, POWDER, LYOPHILIZED, FOR SOLUTION INTRAVENOUS
Status: COMPLETED
Start: 2023-10-30 | End: 2023-10-30

## 2023-10-30 RX ORDER — LIDOCAINE HYDROCHLORIDE 10 MG/ML
INJECTION, SOLUTION INFILTRATION; PERINEURAL
Status: COMPLETED
Start: 2023-10-30 | End: 2023-10-30

## 2023-10-30 RX ORDER — SODIUM CHLORIDE 9 MG/ML
INJECTION, SOLUTION INTRAVENOUS CONTINUOUS
Status: DISCONTINUED | OUTPATIENT
Start: 2023-10-30 | End: 2023-10-30

## 2023-10-30 RX ORDER — HEPARIN SODIUM 5000 [USP'U]/ML
INJECTION, SOLUTION INTRAVENOUS; SUBCUTANEOUS
Status: COMPLETED
Start: 2023-10-30 | End: 2023-10-30

## 2023-10-30 RX ORDER — MIDAZOLAM HYDROCHLORIDE 1 MG/ML
INJECTION INTRAMUSCULAR; INTRAVENOUS
Status: COMPLETED
Start: 2023-10-30 | End: 2023-10-30

## 2023-10-30 NOTE — PROGRESS NOTES
Rc'd pt from IR s/p Port insertion in stable condition. VSS. Aqualcel to right chest and 2x2 to right neck are soft, clean and dry. No bleeding or hematoma. Pt denies c/o pain or discomfort. Pt tolerating po intake well. Pts boyfriend at bedside. 12:20: Bedrest completed. Pt needs to get to radiation onc. Pt ambulated and tolerated well. Voided. IV removed with catheter intact. Reviewed discharge instructions with pt and boyfriend. Verbalizes understanding. To rad onc via w/c with boyfriend in stable condition without c/o.        ~Follow up with MDs as scheduled. ~ Rest today and resume regular activity in the am as tolerated.   ~ No driving or drinking alcohol for 24hrs.   ~ Resume diet and medications.   ~ No lifting more than 10 pounds for 48hrs. Avoid lifting heavy objects such as a purse or a backpack from the shoulder of which the port was placed for 48hrs.   ~ Avoid strenuous activity with the arm of which th port was placed for 48hrs.   ~ Keep bandage completely dry and intact. Small white bandage can be removed tomorrow. Leave large brown bandage on and cancer nurses will remove that on and give further bathing instructions.   ~ Notify MD if any signs of infection. .. Fever, chills, redness, swelling or drainage.   ~ It is normal to have some discomfort at the incisions ite for the first 24-48hrs. You make take over the counter Tylenol if needed.   ~ Keep port card in your wallet.

## 2023-10-31 ENCOUNTER — OFFICE VISIT (OUTPATIENT)
Dept: HEMATOLOGY/ONCOLOGY | Facility: HOSPITAL | Age: 53
End: 2023-10-31
Attending: INTERNAL MEDICINE
Payer: MEDICAID

## 2023-10-31 ENCOUNTER — SOCIAL WORK SERVICES (OUTPATIENT)
Dept: HEMATOLOGY/ONCOLOGY | Facility: HOSPITAL | Age: 53
End: 2023-10-31

## 2023-10-31 ENCOUNTER — TELEPHONE (OUTPATIENT)
Dept: HEMATOLOGY/ONCOLOGY | Facility: HOSPITAL | Age: 53
End: 2023-10-31

## 2023-10-31 VITALS
HEART RATE: 106 BPM | DIASTOLIC BLOOD PRESSURE: 79 MMHG | WEIGHT: 142.38 LBS | OXYGEN SATURATION: 98 % | SYSTOLIC BLOOD PRESSURE: 111 MMHG | RESPIRATION RATE: 16 BRPM | HEIGHT: 61.65 IN | TEMPERATURE: 98 F | BODY MASS INDEX: 26.2 KG/M2

## 2023-10-31 DIAGNOSIS — C76.0 HEAD AND NECK CANCER (HCC): Primary | ICD-10-CM

## 2023-10-31 DIAGNOSIS — T45.1X5A CHEMOTHERAPY-INDUCED NAUSEA: ICD-10-CM

## 2023-10-31 DIAGNOSIS — R11.0 CHEMOTHERAPY-INDUCED NAUSEA: ICD-10-CM

## 2023-10-31 LAB
ANION GAP SERPL CALC-SCNC: 7 MMOL/L (ref 0–18)
BASOPHILS # BLD AUTO: 0.05 X10(3) UL (ref 0–0.2)
BASOPHILS NFR BLD AUTO: 0.9 %
BUN BLD-MCNC: 14 MG/DL (ref 9–23)
CALCIUM BLD-MCNC: 9.3 MG/DL (ref 8.5–10.1)
CHLORIDE SERPL-SCNC: 103 MMOL/L (ref 98–112)
CO2 SERPL-SCNC: 25 MMOL/L (ref 21–32)
CREAT BLD-MCNC: 0.8 MG/DL
EGFRCR SERPLBLD CKD-EPI 2021: 88 ML/MIN/1.73M2 (ref 60–?)
EOSINOPHIL # BLD AUTO: 0.16 X10(3) UL (ref 0–0.7)
EOSINOPHIL NFR BLD AUTO: 2.8 %
ERYTHROCYTE [DISTWIDTH] IN BLOOD BY AUTOMATED COUNT: 12.2 %
FASTING STATUS PATIENT QL REPORTED: NO
GLUCOSE BLD-MCNC: 118 MG/DL (ref 70–99)
HCT VFR BLD AUTO: 35.6 %
HGB BLD-MCNC: 12.2 G/DL
IMM GRANULOCYTES # BLD AUTO: 0.02 X10(3) UL (ref 0–1)
IMM GRANULOCYTES NFR BLD: 0.3 %
LYMPHOCYTES # BLD AUTO: 0.94 X10(3) UL (ref 1–4)
LYMPHOCYTES NFR BLD AUTO: 16.3 %
MCH RBC QN AUTO: 30.8 PG (ref 26–34)
MCHC RBC AUTO-ENTMCNC: 34.3 G/DL (ref 31–37)
MCV RBC AUTO: 89.9 FL
MONOCYTES # BLD AUTO: 0.35 X10(3) UL (ref 0.1–1)
MONOCYTES NFR BLD AUTO: 6.1 %
NEUTROPHILS # BLD AUTO: 4.23 X10 (3) UL (ref 1.5–7.7)
NEUTROPHILS # BLD AUTO: 4.23 X10(3) UL (ref 1.5–7.7)
NEUTROPHILS NFR BLD AUTO: 73.6 %
OSMOLALITY SERPL CALC.SUM OF ELEC: 282 MOSM/KG (ref 275–295)
PLATELET # BLD AUTO: 286 10(3)UL (ref 150–450)
POTASSIUM SERPL-SCNC: 4.1 MMOL/L (ref 3.5–5.1)
RBC # BLD AUTO: 3.96 X10(6)UL
SODIUM SERPL-SCNC: 135 MMOL/L (ref 136–145)
WBC # BLD AUTO: 5.8 X10(3) UL (ref 4–11)

## 2023-10-31 PROCEDURE — 96367 TX/PROPH/DG ADDL SEQ IV INF: CPT

## 2023-10-31 PROCEDURE — 96376 TX/PRO/DX INJ SAME DRUG ADON: CPT

## 2023-10-31 PROCEDURE — 99213 OFFICE O/P EST LOW 20 MIN: CPT | Performed by: INTERNAL MEDICINE

## 2023-10-31 PROCEDURE — 96366 THER/PROPH/DIAG IV INF ADDON: CPT

## 2023-10-31 PROCEDURE — 96413 CHEMO IV INFUSION 1 HR: CPT

## 2023-10-31 PROCEDURE — 96375 TX/PRO/DX INJ NEW DRUG ADDON: CPT

## 2023-10-31 PROCEDURE — 96361 HYDRATE IV INFUSION ADD-ON: CPT

## 2023-10-31 RX ORDER — ONDANSETRON HYDROCHLORIDE 8 MG/1
8 TABLET, FILM COATED ORAL EVERY 8 HOURS PRN
Qty: 30 TABLET | Refills: 3 | Status: SHIPPED | OUTPATIENT
Start: 2023-10-31

## 2023-10-31 RX ORDER — CYCLOBENZAPRINE HCL 5 MG
5 TABLET ORAL 3 TIMES DAILY PRN
Qty: 30 TABLET | Refills: 0 | Status: SHIPPED | OUTPATIENT
Start: 2023-10-31

## 2023-10-31 RX ORDER — OLANZAPINE 5 MG/1
5 TABLET ORAL NIGHTLY
Qty: 30 TABLET | Refills: 1 | Status: SHIPPED | OUTPATIENT
Start: 2023-10-31

## 2023-10-31 RX ORDER — SODIUM CHLORIDE 9 MG/ML
1000 INJECTION, SOLUTION INTRAVENOUS ONCE
Status: COMPLETED | OUTPATIENT
Start: 2023-10-31 | End: 2023-10-31

## 2023-10-31 RX ORDER — PALONOSETRON 0.05 MG/ML
0.25 INJECTION, SOLUTION INTRAVENOUS ONCE
Status: COMPLETED | OUTPATIENT
Start: 2023-10-31 | End: 2023-10-31

## 2023-10-31 RX ORDER — BENZONATATE 100 MG/1
100 CAPSULE ORAL 3 TIMES DAILY PRN
Qty: 60 CAPSULE | Refills: 1 | Status: SHIPPED | OUTPATIENT
Start: 2023-10-31

## 2023-10-31 RX ORDER — PROCHLORPERAZINE MALEATE 10 MG
10 TABLET ORAL EVERY 6 HOURS PRN
Qty: 30 TABLET | Refills: 3 | Status: SHIPPED | OUTPATIENT
Start: 2023-10-31

## 2023-10-31 RX ADMIN — PALONOSETRON 0.25 MG: 0.05 INJECTION, SOLUTION INTRAVENOUS at 09:40:00

## 2023-10-31 RX ADMIN — SODIUM CHLORIDE 1000 ML: 9 INJECTION, SOLUTION INTRAVENOUS at 14:12:00

## 2023-10-31 NOTE — PATIENT INSTRUCTIONS
[place] your port site clean and dry for the next 1-2 days. On Friday, you may shower and get it wet, but be careful to not scrub the incision site. The skin glue will fall off on it's own. You can cover the incision with a band-aid or leave it open to air depending on your comfort.

## 2023-10-31 NOTE — PROGRESS NOTES
met with patient to assist with requests. She continues to have no response from her  at [facility], though she has called numerous times.  called before, and again today, leaving a message asking for a return call. Encouraged patient to go to the office in person, whether an appointment is required or not, as it will be easier for her to either make an appointment or talk to someone who can assist.   Completed letters for her to submit with [employer] request, and to bring to [facility]; provided to patient in person. [employer] application completed online on behalf of patient as requested as well. Letter:   Sayra Dumont ( 1970) is a patient under my care at [facility] for her diagnosis of Head and Neck Cancer- Squamous Cell Carcinoma of Larynx. She is currently undergoing Chemotherapy and Radiation treatments, resulting in pain, weakness, fatigue, nausea, loss of endurance, dehydration, skin irritation, loss of voice, and compromised immune system. She is unable to work due to her disease and treatment, causing her financial burden. She requires internet access to properly manage her hospital patient portal for lab results, appointments/treatments, and communication with her care team.    Letter:   Sayra Dumont ( 1970) is a patient under my care at [facility] for her diagnosis of Head and Neck Cancer- Squamous Cell Carcinoma of Larynx. She is currently undergoing Chemotherapy and Radiation treatments, resulting in pain, weakness, fatigue, nausea, loss of endurance, dehydration, skin irritation, loss of voice, and compromised immune system. She is unable to work due to her disease and treatment, causing her financial burden.

## 2023-10-31 NOTE — TELEPHONE ENCOUNTER
Please call [facility] to clarify instructions on Ontansetron 8 mg tablet. Thank you.

## 2023-10-31 NOTE — PROGRESS NOTES
Pt here for C1D15 Drug(s) Cisplatin. Arrives Ambulating independently, accompanied by Family member     Patient was evaluated today by MD.    Oral medications included in this regimen:  no    Patient confirms comprehension of cancer treatment schedule:  yes    Pregnancy screening:  Not applicable    Modifications in dose or schedule:  No    Medications appearance and physical integrity checked by RN: yes. Chemotherapy IV pump settings verified by 2 RNs:  Yes.   Frequency of blood return and site check throughout administration: Prior to administration, Prior to each drug, and At completion of therapy     Infusion/treatment outcome:  patient tolerated treatment without incident    Education Record    Learner:  Patient and Family Member  Barriers / Limitations:  None  Method:  Discussion and Printed material  Education / instructions given:  plan of care, next appts, port site care  Outcome:  Shows understanding    Discharged Home, Ambulating independently, accompanied by:Family member    Patient/family verbalized understanding of future appointments: by printed AVS

## 2023-10-31 NOTE — PROGRESS NOTES
Outpatient Oncology Care Plan   Problem list:   fatigue   Problems related to:   side effect of treatment   Interventions:   provided general teaching   Expected outcomes:   understands plan of care   Progress towards outcome: making progress     Education Record   Learner: Patient and Spouse   Barriers / Limitations: None   Method: Discussion   Outcome: Shows understanding   Comments:  Patient here for follow-up and planned C1D5 treatment. States she is having more noticeable vocal changes. Energy levels are low by the end of the week. Nausea managed with zofran, compazine, and olanzapine. Will meet with dermatology on 11/9/23 for excoriation. Pain managed with palliative APN.

## 2023-11-01 ENCOUNTER — HOSPITAL ENCOUNTER (OUTPATIENT)
Dept: RADIATION ONCOLOGY | Facility: HOSPITAL | Age: 53
Discharge: HOME | End: 2023-11-01
Attending: RADIOLOGY
Payer: MEDICAID

## 2023-11-01 PROCEDURE — 77386 HC IMRT COMPLEX: CPT | Performed by: RADIOLOGY

## 2023-11-01 NOTE — PROGRESS NOTES
Oncology Nutrition F/UConsultation     Patient Name: Lucy Ku  YOB: 1970  Medical Record Number: [id number]            Account Number: [de-identified]  Dietitian: Jennifer Christine RD, LDN     Date of visit: 10/31/2023     Diet Rx: high protein/calorie, soft as tolerated     Pertinent Dx/PMH: T2N0M0 squamous cell of the larynx              Past Medical History:   Diagnosis Date    Anxiety 12/27/2017    BLOOD DISORDER       anemia    Esophageal reflux      Extrinsic asthma, unspecified      Head and neck cancer ([facility] [address] 10/04/2023    High blood pressure      HTN (hypertension) 9/11/2023    Hx of motion sickness      Irritable bowel syndrome      Migraines      PONV (postoperative nausea and vomiting)       \"violently ill\" at [facility] after Anesthesia; UI next month didn't have a problem, Anesthesia said was [facility]       5/3/23 pt states has been limited to soft foods d/t growth in throat; denies difficulty swallowing liquids    Reflux      Ulcer           TX: concurrent cisplatin/RT (thru 12/5/23)     Other pertinent subjective/objective information: noted pt reporting 10-15 lb unplanned wt loss; diet/sx/activity hx obtained     Pertinent Meds:     Current Outpatient Medications:     pantoprazole 40 MG Oral Tab EC, Take 1 tablet (40 mg total) by mouth 2 (two) times daily. , Disp: , Rfl:     OLANZapine 5 MG Oral Tab, Take 1 tablet (5 mg total) by mouth nightly., Disp: 30 tablet, Rfl: 1    ondansetron (ZOFRAN) 8 MG tablet, Take 1 tablet (8 mg total) by mouth every 8 (eight) hours as needed for Nausea., Disp: 30 tablet, Rfl: 3    prochlorperazine (COMPAZINE) 10 mg tablet, Take 1 tablet (10 mg total) by mouth every 6 (six) hours as needed for Nausea., Disp: 30 tablet, Rfl: 3    levoFLOXacin 500 MG Oral Tab, Take 1 tablet (500 mg total) by mouth daily. , Disp: , Rfl:     maalox/diphenhydramine/lidocaine Oral Suspension, Take 5 mL by mouth 4 (four) times daily before meals and nightly., Disp: 500 mL, Rfl: 1    cyanocobalamin 1000 MCG Oral Tab, Take 1 tablet (1,000 mcg total) by mouth daily. , Disp: , Rfl:     omeprazole 20 MG Oral Capsule Delayed Release, Take 1 capsule (20 mg total) by mouth every morning before breakfast., Disp: , Rfl:     buPROPion HCl ER, Smoking Det, 150 MG Oral Tablet 12 Hr, Take 1 tablet (150 mg total) by mouth 2 (two) times daily. , Disp: , Rfl:     HYDROcodone-acetaminophen  MG Oral Tab, Take 1-2 tablets by mouth every 4 (four) hours as needed for Pain., Disp: 120 tablet, Rfl: 0    lisinopril 20 MG Oral Tab, Take 1 tablet (20 mg total) by mouth daily. , Disp: , Rfl:     MONTELUKAST 10 MG Oral Tab, TAKE 1 TABLET(10 MG) BY MOUTH EVERY NIGHT, Disp: 90 tablet, Rfl: 0    albuterol 108 (90 Base) MCG/ACT Inhalation Aero Soln, Inhale 2 puffs into the lungs every 4 (four) hours as needed. , Disp: 54 g, Rfl: 1    Budesonide-Formoterol Fumarate (SYMBICORT) 160-4.5 MCG/ACT Inhalation Aerosol, INHALE 2 PUFFS BY MOUTH EVERY MORNING, Disp: 1 each, Rfl: 5    fluticasone propionate 50 MCG/ACT Nasal Suspension, SHAKE LIQUID AND USE 2 SPRAYS IN EACH NOSTRIL DAILY, Disp: 48 g, Rfl: 1    ALPRAZolam 0.5 MG Oral Tab, Take 1 tablet (0.5 mg total) by mouth 3 (three) times daily as needed. , Disp: 90 tablet, Rfl: 0    EPINEPHrine 0.3 MG/0.3ML Injection Solution Auto-injector, Inject 0.3 mL (1 each total) as directed one time. Injectf into the muscle one time as needed for anaphylaxis for up to 2 doses, Disp: , Rfl:     benzonatate 200 MG Oral Cap, Take 1 capsule (200 mg total) by mouth 3 (three) times daily as needed for cough. , Disp: 50 capsule, Rfl: 1    Simethicone (GAS-X OR), daily. , Disp: , Rfl:     albuterol (VENTOLIN) (2.5 MG/3ML) 0.083% Inhalation Nebu Soln, Take 3 mL (2.5 mg total) by nebulization every 6 (six) hours as needed. , Disp: , Rfl:      Pertinent Labs: noted     Height: 5'1.65\"                     IBW: 110 +/- 10%     WT HX:   10/31/23: 10/24/23:  63.1 kg (139 lb 3.2 oz)  10/17/23 : 62.3 kg (137 lb 6.4 oz)  10/05/23 : 63 kg (139 lb)  10/06/23 : 63 kg (139 lb)  10/04/23 : 63.6 kg (140 lb 3.2 oz)  10/04/23 : 63.3 kg (139 lb 9.6 oz)  10/03/23 : 64.4 kg (142 lb)  05/16/23 : 64.9 kg (143 lb)  11/17/21 : 69.9 kg (154 lb)  09/29/21 : 70.3 kg (155 lb)        Estimated Nutrition Needs: 25-30 kcals/kg = 2501-7240 KCALS/d; 1.5 gms protein/kg = 93 gms/d      Assessment/Plan: RD f/u w/ pt and life partner in tx room. Pt noted tolerating both chemoRT well. Wt stable. RD offered support. RD will continue to work with throughout tx. The [facility] makes medical notes like these available to patients in the interest of transparency. Please be advised this is a medical document. Medical documents are intended to carry relevant information, facts as evident, and the clinical opinion of the practitioner. The medical note is intended as peer to peer communication and may appear blunt or direct. It is written in medical language and may contain abbreviations or verbiage that are unfamiliar.

## 2023-11-02 ENCOUNTER — HOSPITAL ENCOUNTER (OUTPATIENT)
Dept: RADIATION ONCOLOGY | Facility: HOSPITAL | Age: 53
Discharge: HOME | End: 2023-11-02
Attending: RADIOLOGY
Payer: MEDICAID

## 2023-11-02 VITALS
OXYGEN SATURATION: 99 % | TEMPERATURE: 98 F | RESPIRATION RATE: 20 BRPM | HEART RATE: 89 BPM | SYSTOLIC BLOOD PRESSURE: 146 MMHG | BODY MASS INDEX: 27 KG/M2 | DIASTOLIC BLOOD PRESSURE: 94 MMHG | WEIGHT: 145.31 LBS

## 2023-11-02 DIAGNOSIS — C32.8 MALIGNANT NEOPLASM OF OVERLAPPING SITES OF LARYNX (HCC): Primary | ICD-10-CM

## 2023-11-02 PROCEDURE — 77386 HC IMRT COMPLEX: CPT | Performed by: RADIOLOGY

## 2023-11-03 PROCEDURE — 77386 HC IMRT COMPLEX: CPT | Performed by: RADIOLOGY

## 2023-11-03 PROCEDURE — 77336 RADIATION PHYSICS CONSULT: CPT | Performed by: RADIOLOGY

## 2023-11-06 PROCEDURE — 77386 HC IMRT COMPLEX: CPT | Performed by: RADIOLOGY

## 2023-11-07 ENCOUNTER — OFFICE VISIT (OUTPATIENT)
Dept: HEMATOLOGY/ONCOLOGY | Facility: HOSPITAL | Age: 53
End: 2023-11-07
Attending: INTERNAL MEDICINE
Payer: MEDICAID

## 2023-11-07 VITALS
HEIGHT: 61.65 IN | OXYGEN SATURATION: 98 % | RESPIRATION RATE: 16 BRPM | TEMPERATURE: 98 F | SYSTOLIC BLOOD PRESSURE: 115 MMHG | HEART RATE: 95 BPM | DIASTOLIC BLOOD PRESSURE: 76 MMHG | BODY MASS INDEX: 26.13 KG/M2 | WEIGHT: 142 LBS

## 2023-11-07 DIAGNOSIS — Z91.09 ENVIRONMENTAL ALLERGIES: ICD-10-CM

## 2023-11-07 DIAGNOSIS — C76.0 HEAD AND NECK CANCER (HCC): Primary | ICD-10-CM

## 2023-11-07 DIAGNOSIS — G89.3 NEOPLASM RELATED PAIN: ICD-10-CM

## 2023-11-07 DIAGNOSIS — Z51.11 ENCOUNTER FOR CHEMOTHERAPY MANAGEMENT: ICD-10-CM

## 2023-11-07 DIAGNOSIS — R11.0 CHEMOTHERAPY-INDUCED NAUSEA: ICD-10-CM

## 2023-11-07 DIAGNOSIS — T45.1X5A CHEMOTHERAPY-INDUCED NAUSEA: ICD-10-CM

## 2023-11-07 DIAGNOSIS — C32.8 MALIGNANT NEOPLASM OF OVERLAPPING SITES OF LARYNX (HCC): ICD-10-CM

## 2023-11-07 LAB
ANION GAP SERPL CALC-SCNC: 5 MMOL/L (ref 0–18)
BASOPHILS # BLD AUTO: 0.04 X10(3) UL (ref 0–0.2)
BASOPHILS NFR BLD AUTO: 0.9 %
BUN BLD-MCNC: 12 MG/DL (ref 9–23)
CALCIUM BLD-MCNC: 9.2 MG/DL (ref 8.5–10.1)
CHLORIDE SERPL-SCNC: 105 MMOL/L (ref 98–112)
CO2 SERPL-SCNC: 27 MMOL/L (ref 21–32)
CREAT BLD-MCNC: 0.8 MG/DL
EGFRCR SERPLBLD CKD-EPI 2021: 88 ML/MIN/1.73M2 (ref 60–?)
EOSINOPHIL # BLD AUTO: 0.13 X10(3) UL (ref 0–0.7)
EOSINOPHIL NFR BLD AUTO: 3 %
ERYTHROCYTE [DISTWIDTH] IN BLOOD BY AUTOMATED COUNT: 12.3 %
GLUCOSE BLD-MCNC: 92 MG/DL (ref 70–99)
HCT VFR BLD AUTO: 33.8 %
HGB BLD-MCNC: 11.3 G/DL
IMM GRANULOCYTES # BLD AUTO: 0.01 X10(3) UL (ref 0–1)
IMM GRANULOCYTES NFR BLD: 0.2 %
LYMPHOCYTES # BLD AUTO: 0.59 X10(3) UL (ref 1–4)
LYMPHOCYTES NFR BLD AUTO: 13.8 %
MCH RBC QN AUTO: 30.9 PG (ref 26–34)
MCHC RBC AUTO-ENTMCNC: 33.4 G/DL (ref 31–37)
MCV RBC AUTO: 92.3 FL
MONOCYTES # BLD AUTO: 0.34 X10(3) UL (ref 0.1–1)
MONOCYTES NFR BLD AUTO: 7.9 %
NEUTROPHILS # BLD AUTO: 3.17 X10 (3) UL (ref 1.5–7.7)
NEUTROPHILS # BLD AUTO: 3.17 X10(3) UL (ref 1.5–7.7)
NEUTROPHILS NFR BLD AUTO: 74.2 %
OSMOLALITY SERPL CALC.SUM OF ELEC: 283 MOSM/KG (ref 275–295)
PLATELET # BLD AUTO: 238 10(3)UL (ref 150–450)
POTASSIUM SERPL-SCNC: 4.4 MMOL/L (ref 3.5–5.1)
RBC # BLD AUTO: 3.66 X10(6)UL
SODIUM SERPL-SCNC: 137 MMOL/L (ref 136–145)
WBC # BLD AUTO: 4.3 X10(3) UL (ref 4–11)

## 2023-11-07 PROCEDURE — 96413 CHEMO IV INFUSION 1 HR: CPT

## 2023-11-07 PROCEDURE — 96367 TX/PROPH/DG ADDL SEQ IV INF: CPT

## 2023-11-07 PROCEDURE — 80048 BASIC METABOLIC PNL TOTAL CA: CPT

## 2023-11-07 PROCEDURE — 99215 OFFICE O/P EST HI 40 MIN: CPT | Performed by: NURSE PRACTITIONER

## 2023-11-07 PROCEDURE — 77386 HC IMRT COMPLEX: CPT | Performed by: RADIOLOGY

## 2023-11-07 PROCEDURE — 96361 HYDRATE IV INFUSION ADD-ON: CPT

## 2023-11-07 PROCEDURE — 96376 TX/PRO/DX INJ SAME DRUG ADON: CPT

## 2023-11-07 PROCEDURE — 96366 THER/PROPH/DIAG IV INF ADDON: CPT

## 2023-11-07 PROCEDURE — 96375 TX/PRO/DX INJ NEW DRUG ADDON: CPT

## 2023-11-07 PROCEDURE — 85025 COMPLETE CBC W/AUTO DIFF WBC: CPT

## 2023-11-07 RX ORDER — PALONOSETRON 0.05 MG/ML
0.25 INJECTION, SOLUTION INTRAVENOUS ONCE
Status: COMPLETED | OUTPATIENT
Start: 2023-11-07 | End: 2023-11-07

## 2023-11-07 RX ORDER — SODIUM CHLORIDE 9 MG/ML
1000 INJECTION, SOLUTION INTRAVENOUS ONCE
Status: COMPLETED | OUTPATIENT
Start: 2023-11-07 | End: 2023-11-07

## 2023-11-07 RX ADMIN — SODIUM CHLORIDE 1000 ML: 9 INJECTION, SOLUTION INTRAVENOUS at 14:54:00

## 2023-11-07 RX ADMIN — PALONOSETRON 0.25 MG: 0.05 INJECTION, SOLUTION INTRAVENOUS at 10:31:00

## 2023-11-07 NOTE — PROGRESS NOTES
Pt here for C1D22 Drug(s) cisplatin. Arrives Ambulating independently, accompanied by Friend     Patient was evaluated today by SHAHRIAR. Oral medications included in this regimen:  no    Patient confirms comprehension of cancer treatment schedule:  yes    Pregnancy screening:  Denies possibility of pregnancy    Modifications in dose or schedule:  No    Medications appearance and physical integrity checked by RN: yes. Chemotherapy IV pump settings verified by 2 RNs:  Yes.   Frequency of blood return and site check throughout administration: Prior to administration, Prior to each drug, and At completion of therapy     Infusion/treatment outcome:  patient tolerated treatment without incident    Education Record    Learner:  Patient and Friend  Barriers / Limitations:  None  Method:  Discussion and Printed material  Education / instructions given:  plan of care, next appts  Outcome:  Shows understanding    Discharged Home, Ambulating independently, accompanied by:Friend    Patient/family verbalized understanding of future appointments: by printed AVS

## 2023-11-07 NOTE — PROGRESS NOTES
Patient here for C1D22 Cisplatin and RT. Patient states that her throat pain is at a 10 then comes down to a 7 after taking a norco. Patient states that she has an ok appetite and is taking in adequate fluid intake. Patient has no further questions or concerns at this time. Patient accompanied by spouse. Education Record    Learner:  Patient and Spouse    Disease / Diagnosis: malignant neoplasm of larynx.       Barriers / Limitations:  None   Comments:    Method:  Discussion   Comments:    General Topics:  Medication, Side effects and symptom management, and Plan of care reviewed   Comments:    Outcome:  Shows understanding   Comments:

## 2023-11-08 ENCOUNTER — OFFICE VISIT (OUTPATIENT)
Dept: HEMATOLOGY/ONCOLOGY | Facility: HOSPITAL | Age: 53
End: 2023-11-08
Attending: INTERNAL MEDICINE
Payer: MEDICAID

## 2023-11-08 VITALS
SYSTOLIC BLOOD PRESSURE: 131 MMHG | OXYGEN SATURATION: 98 % | BODY MASS INDEX: 26 KG/M2 | DIASTOLIC BLOOD PRESSURE: 95 MMHG | TEMPERATURE: 98 F | HEART RATE: 98 BPM | WEIGHT: 138.81 LBS

## 2023-11-08 DIAGNOSIS — K59.03 DRUG-INDUCED CONSTIPATION: ICD-10-CM

## 2023-11-08 DIAGNOSIS — C76.0 HEAD AND NECK CANCER (HCC): ICD-10-CM

## 2023-11-08 DIAGNOSIS — G89.3 NEOPLASM RELATED PAIN: Primary | ICD-10-CM

## 2023-11-08 DIAGNOSIS — Z51.5 PALLIATIVE CARE BY SPECIALIST: ICD-10-CM

## 2023-11-08 PROCEDURE — 77386 HC IMRT COMPLEX: CPT | Performed by: RADIOLOGY

## 2023-11-08 PROCEDURE — 99214 OFFICE O/P EST MOD 30 MIN: CPT | Performed by: NURSE PRACTITIONER

## 2023-11-08 RX ORDER — HYDROMORPHONE HYDROCHLORIDE 2 MG/1
2 TABLET ORAL EVERY 4 HOURS PRN
Qty: 90 TABLET | Refills: 0 | Status: SHIPPED | OUTPATIENT
Start: 2023-11-08

## 2023-11-08 NOTE — PROGRESS NOTES
Palliative Care Follow Up Note     Patient Name: Jill Jacobsen   YOB: 1970   Medical Record Number: [id number]   CSN: [id number]   Date of visit: 11/8/2023     Chief Complaint/Reason for Visit:  Follow up visit for symptoms     History of Present Illness:         Jill Jacobsen is a 48year old female with larynx cancer receiving chemo/RT. She feels her R ear, jaw, cheek are less intense. Her throat and mouth pain are more intense as she continues with RT. It does affect oral intake and swallowing. It feels like a raw burning pain. She complains of thick saliva and coughs a lot. She is using 1/2 norco tab every 2 hrs since a full norco dose every 4 hrs causes N/V. This regimen had been helpful for pain until last week. She doesn't tolerate oxycodone which causes vertigo. She is struggling with constipation and is using colace, prunes and senna with some benefit. She will miralax when needed. She is using cannabis at night which is helping with sleep and has benefited appetite slightly. She would like pain better controlled.          Problem List:  Patient Active Problem List   Diagnosis    Asthma    Deviated nasal septum    GERD (gastroesophageal reflux disease)    Tobacco abuse    Fibroadenoma    Verruca    Sinusitis    Knee contusion    Esophageal spasm    Iron deficiency anemia due to chronic blood loss    Sinusitis, acute    Subacute pansinusitis    Iron deficiency    Mass of breast, right    Breast tenderness    Chronic bronchitis (HCC)    Persistent cough    Fatigue, unspecified type    Menorrhagia with regular cycle    Anxiety    Epiglottic lesion    Seasonal allergies    Sore throat    Head and neck cancer ([facility] [address]    Malignant neoplasm of overlapping sites of larynx [facility])    Palliative care by specialist    HTN (hypertension)      Medical History:  Past Medical History:   Diagnosis Date    Anxiety 12/27/2017    BLOOD DISORDER     anemia    Esophageal reflux Extrinsic asthma, unspecified     Head and neck cancer ([facility] [address] 10/04/2023    High blood pressure     HTN (hypertension) 9/11/2023    Hx of motion sickness     Irritable bowel syndrome     Migraines     PONV (postoperative nausea and vomiting)     \"violently ill\" at [facility] after Anesthesia; UI next month didn't have a problem, Anesthesia said was probably from the gas used at [address] with swallowing     5/3/23 pt states has been limited to soft foods d/t growth in throat; denies difficulty swallowing liquids    Reflux     Ulcer      Surgical History:  Past Surgical History:   Procedure Laterality Date    BENIGN BIOPSY RIGHT  05/22/2013    FA    ENDOMETRIAL ABLATION      EXCISION TURBINATE,SUBMUCOUS  08/14/2013    Procedure: ADENOIDECTOMY;  Surgeon: Patricia March MD;  Location: [address]  08/14/2013    Procedure: ADENOIDECTOMY;  Surgeon: Patricia March MD;  Location: [address] [facility] polypectomy x2    OTHER SURGICAL HISTORY      liposuction    REMOVAL ADENOIDS,PRIMARY,12+ Y/O  08/14/2013    Procedure: ENDOSCOPIC SUBMUCOUS RESECTION INFERIOR TURBINATES;  Surgeon: Patricia March MD;  [facility]: [facility] NASAL SEPTUM  08/14/2013    Procedure: SEPTOPLASTY NASAL;  Surgeon: Patricia March MD;  Location: [address] [place] [address]       Allergies: Allergies   Allergen Reactions    Latex RASH and OTHER (SEE COMMENTS)    Ceftin [Cefuroxime] RASH    Codeine     Oxycodone     Vicodin [Hydrocodone-Acetaminophen] OTHER (SEE COMMENTS)     Per patient allergy was noted 20 years ago, uncofirmed if true allergy     Clindamycin RASH    Penicillins RASH       Palliative Care Social History:    Marital Status:  she is . Kylie Montilla is her support person    Functional History:    ADLs: Independent.   She works part-time as a     Medications:  Current Outpatient Medications   Medication Sig Dispense Refill    HYDROmorphone 2 MG Oral Tab Take 1 tablet (2 mg total) by mouth every 4 (four) hours as needed for Pain. 90 tablet 0    maalox/diphenhydramine/sucralfate Oral Suspension SWISH AND SPIT 5 ML FOUR TIMES DAILY BEFORE A MEAL AND EVERY NIGHT AT BEDTIME      benzonatate 100 MG Oral Cap Take 1 capsule (100 mg total) by mouth 3 (three) times daily as needed for cough. 60 capsule 1    cyclobenzaprine 5 MG Oral Tab Take 1 tablet (5 mg total) by mouth 3 (three) times daily as needed for Muscle spasms. 30 tablet 0    OLANZapine 5 MG Oral Tab Take 1 tablet (5 mg total) by mouth nightly. 30 tablet 1    ondansetron (ZOFRAN) 8 MG tablet Take 1 tablet (8 mg total) by mouth every 8 (eight) hours as needed for Nausea. 30 tablet 3    prochlorperazine (COMPAZINE) 10 mg tablet Take 1 tablet (10 mg total) by mouth every 6 (six) hours as needed for Nausea. 30 tablet 3    guaiFENesin-codeine 100-10 MG/5ML Oral Solution Take 10 mL by mouth every 6 (six) hours as needed. Mometasone Furoate 0.1 % External Cream Apply twice daily to affected area 45 g 1    sennosides 8.6 MG Oral Tab Take 1 tablet (8.6 mg total) by mouth daily. 60 tablet 0    pantoprazole 40 MG Oral Tab EC Take 1 tablet (40 mg total) by mouth 2 (two) times daily. ALPRAZOLAM 0.5 MG Oral Tab Take 1 tab po 30 minute before radiation daily 30 tablet 0    maalox/diphenhydramine/lidocaine Oral Suspension Take 5 mL by mouth 4 (four) times daily before meals and nightly. 500 mL 1    cyanocobalamin 1000 MCG Oral Tab Take 1 tablet (1,000 mcg total) by mouth daily. buPROPion HCl ER, Smoking Det, 150 MG Oral Tablet 12 Hr Take 1 tablet (150 mg total) by mouth 2 (two) times daily. lisinopril 20 MG Oral Tab Take 1 tablet (20 mg total) by mouth daily.       MONTELUKAST 10 MG Oral Tab TAKE 1 TABLET(10 MG) BY MOUTH EVERY NIGHT 90 tablet 0    albuterol 108 (90 Base) MCG/ACT Inhalation Aero Soln Inhale 2 puffs into the lungs every 4 (four) hours as needed. 54 g 1    Budesonide-Formoterol Fumarate (SYMBICORT) 160-4.5 MCG/ACT Inhalation Aerosol INHALE 2 PUFFS BY MOUTH EVERY MORNING 1 each 5    fluticasone propionate 50 MCG/ACT Nasal Suspension SHAKE LIQUID AND USE 2 SPRAYS IN EACH NOSTRIL DAILY 48 g 1    EPINEPHrine 0.3 MG/0.3ML Injection Solution Auto-injector Inject 0.3 mL (1 each total) as directed one time. Injectf into the muscle one time as needed for anaphylaxis for up to 2 doses      Simethicone (GAS-X OR) daily. Review of Systems:  General:  Fatigue. Feels well. Respiratory:  Denies SOB, denies cough  Cardiac:  Denies chest pain, heart palpitations  Abdomen:  Denies constipation, diarrhea. Denies pain. Psych:  No complaints. Sleeping well    Palliative Performance Scale:  100 %    Physical Examination:  General: Patient is alert and oriented, not in acute distress. Respiratory: Normal excursions and effort  Cardiac:   No edema  Abdomen: Soft, non tender   Musculoskeletal: Normal gait. Psych:  Mood/Affect appropriate    Advanced Directives Discussed and Completed:     HCPOA/Health Surrogate: There is no completed HCPOA documentation on file in [facility]. Pain was focus today    Palliative Care:  Norco no longer helpful as RT continues. She is fearful of nausea so will start with low doses and titrate as needed. Will trial hydromorphone to better control pain with goal of minimizing SE. She can supplement with acetaminophen prn      Impression/Plan:   1. Neoplasm related pain  Dilaudid 2mg Q 4 prn  Acetaminophen 1G TID prn  cannabis    2. Constipation  Miralax daily prn  Senna daily prn      Planned Follow up: Return in about 3 weeks (around 11/29/2023).     Encounter Times  Reviewing/Obtaining:    minutes    Medical Exam:   minutes      Plan: 5  minutes    Notes: 5 minutes      Counseling/Education: 25 minutes    Care Coordination:  minutes      My total time spent caring for the patient on the day of the encounter: 35 minutes. The [facility] makes medical notes like these available to patients in the interest of transparency. Please be advised this is a medical document. Medical documents are intended to carry relevant information, facts as evident, and the clinical opinion of the practitioner. The medical note is intended as peer to peer communication and may appear blunt or direct. It is written in medical language and may contain abbreviations or verbiage that are unfamiliar.         Electronically Signed by:  RAIN Ramesh Outpatient Palliative Nurse Practitioner

## 2023-11-09 ENCOUNTER — TELEPHONE (OUTPATIENT)
Dept: RADIATION ONCOLOGY | Facility: HOSPITAL | Age: 53
End: 2023-11-09

## 2023-11-09 ENCOUNTER — HOSPITAL ENCOUNTER (OUTPATIENT)
Dept: RADIATION ONCOLOGY | Facility: HOSPITAL | Age: 53
Discharge: HOME | End: 2023-11-09
Attending: RADIOLOGY
Payer: MEDICAID

## 2023-11-09 PROCEDURE — 77386 HC IMRT COMPLEX: CPT | Performed by: RADIOLOGY

## 2023-11-09 NOTE — TELEPHONE ENCOUNTER
Patient is calling because she need to reschedule her appointment from 11/9/23 because she had another appointment she had to leave and go to please call.

## 2023-11-10 ENCOUNTER — OFFICE VISIT (OUTPATIENT)
Dept: HEMATOLOGY/ONCOLOGY | Facility: HOSPITAL | Age: 53
End: 2023-11-10
Attending: INTERNAL MEDICINE
Payer: MEDICAID

## 2023-11-10 ENCOUNTER — HOSPITAL ENCOUNTER (OUTPATIENT)
Dept: RADIATION ONCOLOGY | Facility: HOSPITAL | Age: 53
Discharge: HOME | End: 2023-11-10
Attending: RADIOLOGY
Payer: MEDICAID

## 2023-11-10 VITALS
BODY MASS INDEX: 26 KG/M2 | TEMPERATURE: 99 F | HEART RATE: 92 BPM | DIASTOLIC BLOOD PRESSURE: 69 MMHG | SYSTOLIC BLOOD PRESSURE: 95 MMHG | RESPIRATION RATE: 20 BRPM | WEIGHT: 140 LBS | OXYGEN SATURATION: 96 %

## 2023-11-10 VITALS
RESPIRATION RATE: 16 BRPM | HEART RATE: 107 BPM | BODY MASS INDEX: 26 KG/M2 | WEIGHT: 140 LBS | TEMPERATURE: 98 F | OXYGEN SATURATION: 99 % | DIASTOLIC BLOOD PRESSURE: 74 MMHG | SYSTOLIC BLOOD PRESSURE: 114 MMHG

## 2023-11-10 DIAGNOSIS — R11.0 NAUSEA: Primary | ICD-10-CM

## 2023-11-10 DIAGNOSIS — G89.3 NEOPLASM RELATED PAIN: ICD-10-CM

## 2023-11-10 DIAGNOSIS — C32.8 MALIGNANT NEOPLASM OF OVERLAPPING SITES OF LARYNX (HCC): Primary | ICD-10-CM

## 2023-11-10 DIAGNOSIS — C32.8 MALIGNANT NEOPLASM OF OVERLAPPING SITES OF LARYNX (HCC): ICD-10-CM

## 2023-11-10 DIAGNOSIS — Z51.5 PALLIATIVE CARE BY SPECIALIST: ICD-10-CM

## 2023-11-10 PROCEDURE — 77336 RADIATION PHYSICS CONSULT: CPT | Performed by: RADIOLOGY

## 2023-11-10 PROCEDURE — 99214 OFFICE O/P EST MOD 30 MIN: CPT | Performed by: NURSE PRACTITIONER

## 2023-11-10 PROCEDURE — 77386 HC IMRT COMPLEX: CPT | Performed by: RADIOLOGY

## 2023-11-10 RX ORDER — SCOLOPAMINE TRANSDERMAL SYSTEM 1 MG/1
1 PATCH, EXTENDED RELEASE TRANSDERMAL
Qty: 10 PATCH | Refills: 0 | Status: SHIPPED | OUTPATIENT
Start: 2023-11-10

## 2023-11-10 NOTE — PROGRESS NOTES
Palliative Care Follow Up Note     Patient Name: Keyur Ugalde   YOB: 1970   Medical Record Number: [id number]   CSN: [id number]   Date of visit: 11/10/2023     Chief Complaint/Reason for Visit:  Acute visit for nausea     History of Present Illness:         Keyur Ugalde is a 48year old female with larynx cancer receiving chemo/RT. She was here today for RT complaining of nausea. She denies vomiting. She is able to drink and is forcing herself to eat since nothing tastes good. Her throat and mouth pain are controlled with dilaudid alternating with acetaminophen. She feels this is working well and doesn't feel it is making nausea worse. She complains of thick saliva which causes her to cough and makes her feel nauseated. The phlegm is becoming thicker which makes her feel nausea and cough. She states she has felt daily nausea since starting chemo. She was struggling with constipation and finally went today which she feels helped slightly. She is using prunes, dates and miralax with benefit. She is using zofran 2X and compazine 2X daily. She continues the olanzapine nightly. She feels this only works slightly but is thankful she isn't vomiting. She has used scopolamine in the past and has found this helpful for nausea and sputum production.          Problem List:  Patient Active Problem List   Diagnosis    Asthma    Deviated nasal septum    GERD (gastroesophageal reflux disease)    Tobacco abuse    Fibroadenoma    Verruca    Sinusitis    Knee contusion    Esophageal spasm    Iron deficiency anemia due to chronic blood loss    Sinusitis, acute    Subacute pansinusitis    Iron deficiency    Mass of breast, right    Breast tenderness    Chronic bronchitis (HCC)    Persistent cough    Fatigue, unspecified type    Menorrhagia with regular cycle    Anxiety    Epiglottic lesion    Seasonal allergies    Sore throat    Head and neck cancer ([address]    Malignant neoplasm of overlapping sites of larynx [facility])    Palliative care by specialist    HTN (hypertension)      Medical History:  Past Medical History:   Diagnosis Date    Anxiety 12/27/2017    BLOOD DISORDER     anemia    Esophageal reflux     Extrinsic asthma, unspecified     Head and neck cancer ([facility] [address] 10/04/2023    High blood pressure     HTN (hypertension) 9/11/2023    Hx of motion sickness     Irritable bowel syndrome     Migraines     PONV (postoperative nausea and vomiting)     \"violently ill\" at [address] after Anesthesia; UI next month didn't have a problem, Anesthesia said was probably from the gas used at [address] [facility] with swallowing     5/3/23 pt states has been limited to soft foods d/t growth in throat; denies difficulty swallowing liquids    Reflux     Ulcer      Surgical History:  Past Surgical History:   Procedure Laterality Date    BENIGN BIOPSY RIGHT  05/22/2013    FA    ENDOMETRIAL ABLATION      EXCISION TURBINATE,SUBMUCOUS  08/14/2013    Procedure: ADENOIDECTOMY;  Surgeon: Moe Degroot MD;  Location: [address] [address]  08/14/2013    Procedure: ADENOIDECTOMY;  Surgeon: Moe Degroot MD;  Location: [address] [place] [address] polypectomy x2    OTHER SURGICAL HISTORY      liposuction    REMOVAL ADENOIDS,PRIMARY,12+ Y/O  08/14/2013    Procedure: ENDOSCOPIC SUBMUCOUS RESECTION INFERIOR TURBINATES;  Surgeon: Moe Degroot MD;  Location: [facility]  08/14/2013    Procedure: SEPTOPLASTY NASAL;  Surgeon: Moe Degroot MD;  Location: [address] [place] [address]       Allergies:   Allergies   Allergen Reactions    Latex RASH and OTHER (SEE COMMENTS)    Ceftin [Cefuroxime] RASH    Codeine     Oxycodone     Vicodin [Hydrocodone-Acetaminophen] OTHER (SEE COMMENTS)     Per patient allergy was noted 20 years ago, uncofirmed if true allergy     Clindamycin RASH Penicillins RASH       Palliative Care Social History:    Marital Status:  she is . Juan Hull is her support person    Functional History:    ADLs: Independent. She works part-time as a     Medications:  Current Outpatient Medications   Medication Sig Dispense Refill    Scopolamine 1.5mg TD patch 1mg/3days Place 1 patch onto the skin every third day. 10 patch 0    HYDROmorphone 2 MG Oral Tab Take 1 tablet (2 mg total) by mouth every 4 (four) hours as needed for Pain. 90 tablet 0    maalox/diphenhydramine/sucralfate Oral Suspension SWISH AND SPIT 5 ML FOUR TIMES DAILY BEFORE A MEAL AND EVERY NIGHT AT BEDTIME      benzonatate 100 MG Oral Cap Take 1 capsule (100 mg total) by mouth 3 (three) times daily as needed for cough. 60 capsule 1    cyclobenzaprine 5 MG Oral Tab Take 1 tablet (5 mg total) by mouth 3 (three) times daily as needed for Muscle spasms. 30 tablet 0    OLANZapine 5 MG Oral Tab Take 1 tablet (5 mg total) by mouth nightly. 30 tablet 1    ondansetron (ZOFRAN) 8 MG tablet Take 1 tablet (8 mg total) by mouth every 8 (eight) hours as needed for Nausea. 30 tablet 3    prochlorperazine (COMPAZINE) 10 mg tablet Take 1 tablet (10 mg total) by mouth every 6 (six) hours as needed for Nausea. 30 tablet 3    guaiFENesin-codeine 100-10 MG/5ML Oral Solution Take 10 mL by mouth every 6 (six) hours as needed. Mometasone Furoate 0.1 % External Cream Apply twice daily to affected area 45 g 1    sennosides 8.6 MG Oral Tab Take 1 tablet (8.6 mg total) by mouth daily. 60 tablet 0    pantoprazole 40 MG Oral Tab EC Take 1 tablet (40 mg total) by mouth 2 (two) times daily. ALPRAZOLAM 0.5 MG Oral Tab Take 1 tab po 30 minute before radiation daily 30 tablet 0    maalox/diphenhydramine/lidocaine Oral Suspension Take 5 mL by mouth 4 (four) times daily before meals and nightly. 500 mL 1    cyanocobalamin 1000 MCG Oral Tab Take 1 tablet (1,000 mcg total) by mouth daily.       buPROPion HCl ER, Smoking Det, 150 MG Oral Tablet 12 Hr Take 1 tablet (150 mg total) by mouth 2 (two) times daily. lisinopril 20 MG Oral Tab Take 1 tablet (20 mg total) by mouth daily. MONTELUKAST 10 MG Oral Tab TAKE 1 TABLET(10 MG) BY MOUTH EVERY NIGHT 90 tablet 0    albuterol 108 (90 Base) MCG/ACT Inhalation Aero Soln Inhale 2 puffs into the lungs every 4 (four) hours as needed. 54 g 1    Budesonide-Formoterol Fumarate (SYMBICORT) 160-4.5 MCG/ACT Inhalation Aerosol INHALE 2 PUFFS BY MOUTH EVERY MORNING 1 each 5    fluticasone propionate 50 MCG/ACT Nasal Suspension SHAKE LIQUID AND USE 2 SPRAYS IN EACH NOSTRIL DAILY 48 g 1    EPINEPHrine 0.3 MG/0.3ML Injection Solution Auto-injector Inject 0.3 mL (1 each total) as directed one time. Injectf into the muscle one time as needed for anaphylaxis for up to 2 doses      Simethicone (GAS-X OR) daily. Review of Systems:  General:  Fatigue. Feels well. Respiratory:  Denies SOB, denies cough  Cardiac:  Denies chest pain, heart palpitations  Abdomen:  Denies constipation, diarrhea. Denies pain. Psych:  No complaints. Sleeping well    Palliative Performance Scale:  100 %    Physical Examination:  General: Patient is alert and oriented, not in acute distress. Respiratory: Normal excursions and effort  Cardiac:   No edema  Abdomen: Soft, non tender   Musculoskeletal: Normal gait. Psych:  Mood/Affect appropriate    Advanced Directives Discussed and Completed:     HCPOA/Health Surrogate: There is no [facility] HCP[facility]. Pain was focus today    Palliative Care:  Her nausea is persistent but no emesis. She is able to eat and drink so will continue current plan but instructed her to maximize zofran and compazine by alternating them. She would like to try scopolamine since this has helped her in the past.  This will also help with her saliva which is contributing to her nausea. Will trial scopolamine.   Will continue dilaudid for pain since she feels it has not contributed to nausea    Impression/Plan:   1. Nausea  Zofran 8mg TID  Compazine 10mg Q 6 prn  Olanzapine 5mg Q HS  Cannabis  Scopolamine Q 72    Planned Follow up: Return in about 1 month (around 12/10/2023). Encounter Times  Reviewing/Obtaining:    minutes    Medical Exam:   minutes      Plan: 5  minutes    Notes: 5 minutes      Counseling/Education: 20 minutes    Care Coordination:  minutes      My total time spent caring for the patient on the day of the encounter: 30 minutes. The [facility] makes medical notes like these available to patients in the interest of transparency. Please be advised this is a medical document. Medical documents are intended to carry relevant information, facts as evident, and the clinical opinion of the practitioner. The medical note is intended as peer to peer communication and may appear blunt or direct. It is written in medical language and may contain abbreviations or verbiage that are unfamiliar.         Electronically Signed by:  RAIN Coughlin   THE [facility] Outpatient Palliative Nurse Practitioner

## 2023-11-13 PROCEDURE — 77386 HC IMRT COMPLEX: CPT | Performed by: RADIOLOGY

## 2023-11-13 RX ORDER — CYCLOBENZAPRINE HCL 5 MG
5 TABLET ORAL 3 TIMES DAILY PRN
Qty: 30 TABLET | Refills: 0 | Status: SHIPPED | OUTPATIENT
Start: 2023-11-13 | End: 2023-11-28

## 2023-11-14 ENCOUNTER — OFFICE VISIT (OUTPATIENT)
Dept: HEMATOLOGY/ONCOLOGY | Facility: HOSPITAL | Age: 53
End: 2023-11-14
Attending: INTERNAL MEDICINE
Payer: MEDICAID

## 2023-11-14 VITALS
RESPIRATION RATE: 16 BRPM | SYSTOLIC BLOOD PRESSURE: 116 MMHG | TEMPERATURE: 99 F | BODY MASS INDEX: 25.4 KG/M2 | OXYGEN SATURATION: 99 % | HEIGHT: 61.65 IN | WEIGHT: 138 LBS | DIASTOLIC BLOOD PRESSURE: 80 MMHG | HEART RATE: 92 BPM

## 2023-11-14 DIAGNOSIS — G89.3 NEOPLASM RELATED PAIN: ICD-10-CM

## 2023-11-14 DIAGNOSIS — C76.0 HEAD AND NECK CANCER (HCC): Primary | ICD-10-CM

## 2023-11-14 DIAGNOSIS — T45.1X5A CHEMOTHERAPY-INDUCED NAUSEA: ICD-10-CM

## 2023-11-14 DIAGNOSIS — C32.8 MALIGNANT NEOPLASM OF OVERLAPPING SITES OF LARYNX (HCC): ICD-10-CM

## 2023-11-14 DIAGNOSIS — R11.0 CHEMOTHERAPY-INDUCED NAUSEA: ICD-10-CM

## 2023-11-14 LAB
ANION GAP SERPL CALC-SCNC: 3 MMOL/L (ref 0–18)
BASOPHILS # BLD AUTO: 0.05 X10(3) UL (ref 0–0.2)
BASOPHILS NFR BLD AUTO: 1.4 %
BUN BLD-MCNC: 14 MG/DL (ref 9–23)
CALCIUM BLD-MCNC: 9.5 MG/DL (ref 8.5–10.1)
CHLORIDE SERPL-SCNC: 104 MMOL/L (ref 98–112)
CO2 SERPL-SCNC: 29 MMOL/L (ref 21–32)
CREAT BLD-MCNC: 0.7 MG/DL
EGFRCR SERPLBLD CKD-EPI 2021: 103 ML/MIN/1.73M2 (ref 60–?)
EOSINOPHIL # BLD AUTO: 0.09 X10(3) UL (ref 0–0.7)
EOSINOPHIL NFR BLD AUTO: 2.6 %
ERYTHROCYTE [DISTWIDTH] IN BLOOD BY AUTOMATED COUNT: 13 %
FASTING STATUS PATIENT QL REPORTED: NO
GLUCOSE BLD-MCNC: 99 MG/DL (ref 70–99)
HCT VFR BLD AUTO: 33.9 %
HGB BLD-MCNC: 11.7 G/DL
IMM GRANULOCYTES # BLD AUTO: 0.01 X10(3) UL (ref 0–1)
IMM GRANULOCYTES NFR BLD: 0.3 %
LYMPHOCYTES # BLD AUTO: 0.51 X10(3) UL (ref 1–4)
LYMPHOCYTES NFR BLD AUTO: 14.7 %
MCH RBC QN AUTO: 30.8 PG (ref 26–34)
MCHC RBC AUTO-ENTMCNC: 34.5 G/DL (ref 31–37)
MCV RBC AUTO: 89.2 FL
MONOCYTES # BLD AUTO: 0.3 X10(3) UL (ref 0.1–1)
MONOCYTES NFR BLD AUTO: 8.6 %
NEUTROPHILS # BLD AUTO: 2.52 X10 (3) UL (ref 1.5–7.7)
NEUTROPHILS # BLD AUTO: 2.52 X10(3) UL (ref 1.5–7.7)
NEUTROPHILS NFR BLD AUTO: 72.4 %
OSMOLALITY SERPL CALC.SUM OF ELEC: 283 MOSM/KG (ref 275–295)
PLATELET # BLD AUTO: 198 10(3)UL (ref 150–450)
POTASSIUM SERPL-SCNC: 4.5 MMOL/L (ref 3.5–5.1)
RBC # BLD AUTO: 3.8 X10(6)UL
SODIUM SERPL-SCNC: 136 MMOL/L (ref 136–145)
WBC # BLD AUTO: 3.5 X10(3) UL (ref 4–11)

## 2023-11-14 PROCEDURE — 80048 BASIC METABOLIC PNL TOTAL CA: CPT

## 2023-11-14 PROCEDURE — 85025 COMPLETE CBC W/AUTO DIFF WBC: CPT

## 2023-11-14 PROCEDURE — 96361 HYDRATE IV INFUSION ADD-ON: CPT

## 2023-11-14 PROCEDURE — 96376 TX/PRO/DX INJ SAME DRUG ADON: CPT

## 2023-11-14 PROCEDURE — 96413 CHEMO IV INFUSION 1 HR: CPT

## 2023-11-14 PROCEDURE — 99215 OFFICE O/P EST HI 40 MIN: CPT | Performed by: NURSE PRACTITIONER

## 2023-11-14 PROCEDURE — 77386 HC IMRT COMPLEX: CPT | Performed by: RADIOLOGY

## 2023-11-14 PROCEDURE — 96367 TX/PROPH/DG ADDL SEQ IV INF: CPT

## 2023-11-14 PROCEDURE — 96375 TX/PRO/DX INJ NEW DRUG ADDON: CPT

## 2023-11-14 PROCEDURE — 96366 THER/PROPH/DIAG IV INF ADDON: CPT

## 2023-11-14 RX ORDER — LORAZEPAM 2 MG/ML
0.5 INJECTION INTRAMUSCULAR ONCE
Status: CANCELLED
Start: 2023-11-14

## 2023-11-14 RX ORDER — PALONOSETRON 0.05 MG/ML
0.25 INJECTION, SOLUTION INTRAVENOUS ONCE
Status: COMPLETED | OUTPATIENT
Start: 2023-11-14 | End: 2023-11-14

## 2023-11-14 RX ORDER — LORAZEPAM 2 MG/ML
0.5 INJECTION INTRAMUSCULAR ONCE
Status: COMPLETED | OUTPATIENT
Start: 2023-11-14 | End: 2023-11-14

## 2023-11-14 RX ORDER — SODIUM CHLORIDE 9 MG/ML
1000 INJECTION, SOLUTION INTRAVENOUS ONCE
Status: COMPLETED | OUTPATIENT
Start: 2023-11-14 | End: 2023-11-14

## 2023-11-14 RX ADMIN — PALONOSETRON 0.25 MG: 0.05 INJECTION, SOLUTION INTRAVENOUS at 11:30:00

## 2023-11-14 RX ADMIN — LORAZEPAM 0.5 MG: 2 INJECTION INTRAMUSCULAR at 09:13:00

## 2023-11-14 RX ADMIN — SODIUM CHLORIDE 1000 ML: 9 INJECTION, SOLUTION INTRAVENOUS at 13:55:00

## 2023-11-14 NOTE — PROGRESS NOTES
Patient here for C1D29 Cisplatin. Patient c/o 10/10 neck/throat pain. Patient states she is taking her hydromorphone with no relief. Patient not eating but states she is drinking plenty of water. Patient states that she is nauseous all the time with no relief from Zofran or compazine. [address] APN notified.      Education Record    Learner:  Patient and Family Member    Disease / Diagnosis: head and neck cancer    Barriers / Limitations:  None   Comments:    Method:  Discussion   Comments:    General Topics:  Diet, Medication, Pain, Side effects and symptom management, and Plan of care reviewed   Comments:    Outcome:  Shows understanding   Comments:

## 2023-11-14 NOTE — PROGRESS NOTES
Oncology Nutrition F/UConsultation     Patient Name: Santos Smith  YOB: 1970  Medical Record Number: [id number]            Account Number: [de-identified]  Dietitian: Alan Mora RD, LDN     Date of visit: 11/14/2023     Diet Rx: high protein/calorie, soft as tolerated     Pertinent Dx/PMH: T2N0M0 squamous cell of the larynx              Past Medical History:   Diagnosis Date    Anxiety 12/27/2017    BLOOD DISORDER       anemia    Esophageal reflux      Extrinsic asthma, unspecified      Head and neck cancer ([facility] [address] 10/04/2023    High blood pressure      HTN (hypertension) 9/11/2023    Hx of motion sickness      Irritable bowel syndrome      Migraines      PONV (postoperative nausea and vomiting)       \"violently ill\" at [address] after Anesthesia; UI next month didn't have a problem, Anesthesia said was probably from the gas used at [address] with swallowing       5/3/23 pt states has been limited to soft foods d/t growth in throat; denies difficulty swallowing liquids    Reflux      Ulcer           TX: concurrent cisplatin/RT (thru 12/5/23)     Other pertinent subjective/objective information: noted pt reporting 10-15 lb unplanned wt loss; diet/sx/activity hx obtained     Pertinent Meds:     Current Outpatient Medications:     pantoprazole 40 MG Oral Tab EC, Take 1 tablet (40 mg total) by mouth 2 (two) times daily. , Disp: , Rfl:     OLANZapine 5 MG Oral Tab, Take 1 tablet (5 mg total) by mouth nightly., Disp: 30 tablet, Rfl: 1    ondansetron (ZOFRAN) 8 MG tablet, Take 1 tablet (8 mg total) by mouth every 8 (eight) hours as needed for Nausea., Disp: 30 tablet, Rfl: 3    prochlorperazine (COMPAZINE) 10 mg tablet, Take 1 tablet (10 mg total) by mouth every 6 (six) hours as needed for Nausea., Disp: 30 tablet, Rfl: 3    levoFLOXacin 500 MG Oral Tab, Take 1 tablet (500 mg total) by mouth daily. , Disp: , Rfl:     maalox/diphenhydramine/lidocaine Oral Suspension, Take 5 mL by mouth 4 (four) times daily before meals and nightly., Disp: 500 mL, Rfl: 1    cyanocobalamin 1000 MCG Oral Tab, Take 1 tablet (1,000 mcg total) by mouth daily. , Disp: , Rfl:     omeprazole 20 MG Oral Capsule Delayed Release, Take 1 capsule (20 mg total) by mouth every morning before breakfast., Disp: , Rfl:     buPROPion HCl ER, Smoking Det, 150 MG Oral Tablet 12 Hr, Take 1 tablet (150 mg total) by mouth 2 (two) times daily. , Disp: , Rfl:     HYDROcodone-acetaminophen  MG Oral Tab, Take 1-2 tablets by mouth every 4 (four) hours as needed for Pain., Disp: 120 tablet, Rfl: 0    lisinopril 20 MG Oral Tab, Take 1 tablet (20 mg total) by mouth daily. , Disp: , Rfl:     MONTELUKAST 10 MG Oral Tab, TAKE 1 TABLET(10 MG) BY MOUTH EVERY NIGHT, Disp: 90 tablet, Rfl: 0    albuterol 108 (90 Base) MCG/ACT Inhalation Aero Soln, Inhale 2 puffs into the lungs every 4 (four) hours as needed. , Disp: 54 g, Rfl: 1    Budesonide-Formoterol Fumarate (SYMBICORT) 160-4.5 MCG/ACT Inhalation Aerosol, INHALE 2 PUFFS BY MOUTH EVERY MORNING, Disp: 1 each, Rfl: 5    fluticasone propionate 50 MCG/ACT Nasal Suspension, SHAKE LIQUID AND USE 2 SPRAYS IN EACH NOSTRIL DAILY, Disp: 48 g, Rfl: 1    ALPRAZolam 0.5 MG Oral Tab, Take 1 tablet (0.5 mg total) by mouth 3 (three) times daily as needed. , Disp: 90 tablet, Rfl: 0    EPINEPHrine 0.3 MG/0.3ML Injection Solution Auto-injector, Inject 0.3 mL (1 each total) as directed one time. Injectf into the muscle one time as needed for anaphylaxis for up to 2 doses, Disp: , Rfl:     benzonatate 200 MG Oral Cap, Take 1 capsule (200 mg total) by mouth 3 (three) times daily as needed for cough. , Disp: 50 capsule, Rfl: 1    Simethicone (GAS-X OR), daily. , Disp: , Rfl:     albuterol (VENTOLIN) (2.5 MG/3ML) 0.083% Inhalation Nebu Soln, Take 3 mL (2.5 mg total) by nebulization every 6 (six) hours as needed. , Disp: , Rfl:      Pertinent Labs: noted     Height: 5'1.65\"                     IBW: 110 +/- 10%     WT HX:   11/14/23: 62.6 kg (138 lb)  10/31/23:  64.6 kg (142 lb 6.4 oz)  10/24/23:  63.1 kg (139 lb 3.2 oz)  10/17/23 : 62.3 kg (137 lb 6.4 oz)  10/05/23 : 63 kg (139 lb)  10/06/23 : 63 kg (139 lb)  10/04/23 : 63.6 kg (140 lb 3.2 oz)  10/04/23 : 63.3 kg (139 lb 9.6 oz)  10/03/23 : 64.4 kg (142 lb)  05/16/23 : 64.9 kg (143 lb)  11/17/21 : 69.9 kg (154 lb)  09/29/21 : 70.3 kg (155 lb)        Estimated Nutrition Needs: 25-30 kcals/kg = 4670-3742 KCALS/d; 1.5 gms protein/kg = 93 gms/d      Assessment/Plan: RD f/u w/ pt and life partner in tx room. Pt noted last week pain was 7/10 but starting around Thursday thru today pain is 12/10. Pt using Magic Mouthwash if/when available at home for meals. She continues to c/o Nausea. She notes 6 hours sleep nocturnally and 2-3 hour nap during the day. She drinks primarily H2O now as \"coffee and soda increase phlegm. \"     Pt continues to maintain food log via [employer]:   11/13: 1145 kcals (only able to tolerate 2 meals and 1 shake - Premier Protein mix w/ banana and ice cream -  as per pain)  11/12: 1326 kcals  11/11: 2532 kcals     Wt stable. RD continues to offer support/encouragement and will continue to work with throughout tx. The [facility] makes medical notes like these available to patients in the interest of transparency. Please be advised this is a medical document. Medical documents are intended to carry relevant information, facts as evident, and the clinical opinion of the practitioner. The medical note is intended as peer to peer communication and may appear blunt or direct. It is written in medical language and may contain abbreviations or verbiage that are unfamiliar.

## 2023-11-14 NOTE — PROGRESS NOTES
Pt here for C1D29 Drug(s)Cisplatin. Arrives Ambulating independently, accompanied by Family member     Patient was evaluated today by SHAHRIAR. Oral medications included in this regimen:  no    Patient confirms comprehension of cancer treatment schedule:  yes    Pregnancy screening:  Denies possibility of pregnancy    Modifications in dose or schedule:  No    Medications appearance and physical integrity checked by RN: yes. Chemotherapy IV pump settings verified by 2 RNs:  Yes.   Frequency of blood return and site check throughout administration: Prior to administration and At completion of therapy     Infusion/treatment outcome:  patient tolerated treatment without incident    Education Record    Learner:  Patient and Family Member  Barriers / Limitations:  None  Method:  Brief focused  Education / instructions given:  Head and Neck Cancer  Outcome:  Shows understanding    Discharged Home, Ambulating independently, accompanied by:Self and Family member    Patient/family verbalized understanding of future appointments: by printed AVS

## 2023-11-15 PROCEDURE — 77386 HC IMRT COMPLEX: CPT | Performed by: RADIOLOGY

## 2023-11-16 ENCOUNTER — HOSPITAL ENCOUNTER (OUTPATIENT)
Dept: RADIATION ONCOLOGY | Facility: HOSPITAL | Age: 53
Discharge: HOME | End: 2023-11-16
Attending: RADIOLOGY
Payer: MEDICAID

## 2023-11-16 VITALS
DIASTOLIC BLOOD PRESSURE: 75 MMHG | HEART RATE: 92 BPM | BODY MASS INDEX: 26 KG/M2 | TEMPERATURE: 99 F | OXYGEN SATURATION: 98 % | RESPIRATION RATE: 20 BRPM | WEIGHT: 142.38 LBS | SYSTOLIC BLOOD PRESSURE: 114 MMHG

## 2023-11-16 DIAGNOSIS — C32.8 MALIGNANT NEOPLASM OF OVERLAPPING SITES OF LARYNX (HCC): Primary | ICD-10-CM

## 2023-11-16 PROCEDURE — 77386 HC IMRT COMPLEX: CPT | Performed by: RADIOLOGY

## 2023-11-17 PROCEDURE — 77336 RADIATION PHYSICS CONSULT: CPT | Performed by: RADIOLOGY

## 2023-11-17 PROCEDURE — 77386 HC IMRT COMPLEX: CPT | Performed by: RADIOLOGY

## 2023-11-19 PROCEDURE — 77386 HC IMRT COMPLEX: CPT | Performed by: RADIOLOGY

## 2023-11-20 ENCOUNTER — OFFICE VISIT (OUTPATIENT)
Dept: HEMATOLOGY/ONCOLOGY | Facility: HOSPITAL | Age: 53
End: 2023-11-20
Attending: INTERNAL MEDICINE
Payer: MEDICAID

## 2023-11-20 VITALS
DIASTOLIC BLOOD PRESSURE: 81 MMHG | HEART RATE: 88 BPM | WEIGHT: 135.38 LBS | TEMPERATURE: 98 F | SYSTOLIC BLOOD PRESSURE: 115 MMHG | BODY MASS INDEX: 24.91 KG/M2 | HEIGHT: 61.65 IN | OXYGEN SATURATION: 97 % | RESPIRATION RATE: 18 BRPM

## 2023-11-20 DIAGNOSIS — G89.3 NEOPLASM RELATED PAIN: Primary | ICD-10-CM

## 2023-11-20 DIAGNOSIS — R11.0 NAUSEA: ICD-10-CM

## 2023-11-20 DIAGNOSIS — C76.0 HEAD AND NECK CANCER (HCC): ICD-10-CM

## 2023-11-20 DIAGNOSIS — Z51.5 PALLIATIVE CARE BY SPECIALIST: ICD-10-CM

## 2023-11-20 DIAGNOSIS — K59.03 DRUG-INDUCED CONSTIPATION: ICD-10-CM

## 2023-11-20 PROCEDURE — 99214 OFFICE O/P EST MOD 30 MIN: CPT | Performed by: NURSE PRACTITIONER

## 2023-11-20 PROCEDURE — 77386 HC IMRT COMPLEX: CPT | Performed by: RADIOLOGY

## 2023-11-20 RX ORDER — HYDROMORPHONE HYDROCHLORIDE 2 MG/1
TABLET ORAL EVERY 4 HOURS PRN
Qty: 90 TABLET | Refills: 0 | Status: SHIPPED | OUTPATIENT
Start: 2023-11-20

## 2023-11-20 RX ORDER — MORPHINE SULFATE 15 MG/1
15 TABLET, FILM COATED, EXTENDED RELEASE ORAL EVERY 12 HOURS SCHEDULED
Qty: 60 TABLET | Refills: 0 | Status: SHIPPED | OUTPATIENT
Start: 2023-11-20 | End: 2023-12-20

## 2023-11-20 RX ORDER — POLYETHYLENE GLYCOL 3350 17 G/17G
17 POWDER, FOR SOLUTION ORAL 2 TIMES DAILY PRN
COMMUNITY

## 2023-11-20 NOTE — PROGRESS NOTES
Palliative Care Follow Up Note     Patient Name: Bernabe Esposito   YOB: 1970   Medical Record Number: [id number]   [facility]: [id number]   Date of visit: 11/20/2023     Chief Complaint/Reason for Visit:  Pain follow up     History of Present Illness:         Bernabe Espoisto is a 48year old female with larynx cancer receiving chemo/RT. She is here today complaining of worsening pain in mouth and with swallowing. She is also having neck pain from skin excoriation from RT. She has 11 RT treatments remaining. It is more difficult for her to swallow, eat and talk now as a result of treatment. She is frustrated. She is using dilaudid 4mg Q 4 ATC which is helpful but the relief doesn't last until the next dose. She feels the addition of the scopolamine patch has been helpful for nausea and sputum. She is happy with this. She denies any nausea. She continues to struggle with constipation using miralax and prunes daily. Her last BM was yesterday after 4 days. She is able to drink and is forcing herself to eat soft foods.             Problem List:  Patient Active Problem List   Diagnosis    Asthma    Deviated nasal septum    GERD (gastroesophageal reflux disease)    Tobacco abuse    Fibroadenoma    Verruca    Sinusitis    Knee contusion    Esophageal spasm    Iron deficiency anemia due to chronic blood loss    Sinusitis, acute    Subacute pansinusitis    Iron deficiency    Mass of breast, right    Breast tenderness    Chronic bronchitis (HCC)    Persistent cough    Fatigue, unspecified type    Menorrhagia with regular cycle    Anxiety    Epiglottic lesion    Seasonal allergies    Sore throat    Head and neck cancer ([facility] [address]    Malignant neoplasm of overlapping sites of larynx [facility])    Palliative care by specialist    HTN (hypertension)      Medical History:  Past Medical History:   Diagnosis Date    Anxiety 12/27/2017    BLOOD DISORDER     anemia    Esophageal reflux     Extrinsic asthma, unspecified     Head and neck cancer ([facility] [address] 10/04/2023    High blood pressure     HTN (hypertension) 9/11/2023    Hx of motion sickness     Irritable bowel syndrome     Migraines     PONV (postoperative nausea and vomiting)     \"violently ill\" at [facility] after Anesthesia; UI next month didn't have a problem, Anesthesia said was probably from the gas used at [facility] with swallowing     5/3/23 pt states has been limited to soft foods d/t growth in throat; denies difficulty swallowing liquids    Reflux     Ulcer      Surgical History:  Past Surgical History:   Procedure Laterality Date    BENIGN BIOPSY RIGHT  05/22/2013    FA    ENDOMETRIAL ABLATION      EXCISION TURBINATE,SUBMUCOUS  08/14/2013    Procedure: ADENOIDECTOMY;  Surgeon: Regine Dhaliwal MD;  Location: [place] [address]  08/14/2013    Procedure: ADENOIDECTOMY;  Surgeon: Regine Dhaliwal MD;  Location: [address] [place] [address], [facility] polypectomy x2    OTHER SURGICAL HISTORY      liposuction    REMOVAL ADENOIDS,PRIMARY,12+ Y/O  08/14/2013    Procedure: ENDOSCOPIC SUBMUCOUS RESECTION INFERIOR TURBINATES;  Surgeon: Regine Dhaliwal MD;  Location: [facility]  08/14/2013    Procedure: SEPTOPLASTY NASAL;  Surgeon: Regine Dhaliwal MD;  Location: [address] [place] [address]       Allergies: Allergies   Allergen Reactions    Latex RASH and OTHER (SEE COMMENTS)    Ceftin [Cefuroxime] RASH    Codeine     Oxycodone     Vicodin [Hydrocodone-Acetaminophen] OTHER (SEE COMMENTS)     Per patient allergy was noted 20 years ago, uncofirmed if true allergy     Clindamycin RASH    Penicillins RASH       Palliative Care Social History:    Marital Status:  she is . Modesto Del Cid is her support person    Functional History:    ADLs: Independent.   She works part-time as a     Medications:  Current Outpatient Medications   Medication Sig Dispense Refill    morphINE ER 15 MG Oral Tab CR Take 1 tablet (15 mg total) by mouth every 12 (twelve) hours. 60 tablet 0    HYDROmorphone 2 MG Oral Tab Take 1-2 tablets (2-4 mg total) by mouth every 4 (four) hours as needed for Pain. 90 tablet 0    maalox/diphenhydramine/sucralfate Oral Suspension 5ml BY MOUTH FOUR TIMES DAILY BEFORE MEALS AND nightly      cyclobenzaprine 5 MG Oral Tab Take 1 tablet (5 mg total) by mouth 3 (three) times daily as needed for Muscle spasms. 30 tablet 0    Scopolamine 1.5mg TD patch 1mg/3days Place 1 patch onto the skin every third day. 10 patch 0    benzonatate 100 MG Oral Cap Take 1 capsule (100 mg total) by mouth 3 (three) times daily as needed for cough. 60 capsule 1    OLANZapine 5 MG Oral Tab Take 1 tablet (5 mg total) by mouth nightly. 30 tablet 1    ondansetron (ZOFRAN) 8 MG tablet Take 1 tablet (8 mg total) by mouth every 8 (eight) hours as needed for Nausea. 30 tablet 3    prochlorperazine (COMPAZINE) 10 mg tablet Take 1 tablet (10 mg total) by mouth every 6 (six) hours as needed for Nausea. 30 tablet 3    guaiFENesin-codeine 100-10 MG/5ML Oral Solution Take 10 mL by mouth every 6 (six) hours as needed. Mometasone Furoate 0.1 % External Cream Apply twice daily to affected area 45 g 1    sennosides 8.6 MG Oral Tab Take 1 tablet (8.6 mg total) by mouth daily. 60 tablet 0    pantoprazole 40 MG Oral Tab EC Take 1 tablet (40 mg total) by mouth 2 (two) times daily. ALPRAZOLAM 0.5 MG Oral Tab Take 1 tab po 30 minute before radiation daily 30 tablet 0    maalox/diphenhydramine/lidocaine Oral Suspension Take 5 mL by mouth 4 (four) times daily before meals and nightly. 500 mL 1    cyanocobalamin 1000 MCG Oral Tab Take 1 tablet (1,000 mcg total) by mouth daily. buPROPion HCl ER, Smoking Det, 150 MG Oral Tablet 12 Hr Take 1 tablet (150 mg total) by mouth 2 (two) times daily.       lisinopril 20 MG Oral Tab Take 1 tablet (20 mg total) by mouth daily.      MONTELUKAST 10 MG Oral Tab TAKE 1 TABLET(10 MG) BY MOUTH EVERY NIGHT 90 tablet 0    albuterol 108 (90 Base) MCG/ACT Inhalation Aero Soln Inhale 2 puffs into the lungs every 4 (four) hours as needed. 54 g 1    Budesonide-Formoterol Fumarate (SYMBICORT) 160-4.5 MCG/ACT Inhalation Aerosol INHALE 2 PUFFS BY MOUTH EVERY MORNING 1 each 5    fluticasone propionate 50 MCG/ACT Nasal Suspension SHAKE LIQUID AND USE 2 SPRAYS IN EACH NOSTRIL DAILY 48 g 1    EPINEPHrine 0.3 MG/0.3ML Injection Solution Auto-injector Inject 0.3 mL (1 each total) as directed one time. Injectf into the muscle one time as needed for anaphylaxis for up to 2 doses      Simethicone (GAS-X OR) daily. Review of Systems:  General:  Fatigue. Feels well. Respiratory:  Denies SOB, denies cough  Cardiac:  Denies chest pain, heart palpitations  Abdomen:  Denies constipation, diarrhea. Denies pain. Psych:  No complaints. Sleeping well    Palliative Performance Scale:  100 %    Physical Examination:  General: Patient is alert and oriented, not in acute distress. Skin:  R side of neck with reddened skin in RT field  Respiratory: Normal excursions and effort  Cardiac:   No edema  Abdomen: Soft, non tender   Musculoskeletal: Normal gait. Psych:  Mood/Affect appropriate    Advanced Directives Discussed and Completed:     HCPOA/Health Surrogate: There is no completed HCPOA documentation on file in [facility]. Pain was focus today    Palliative Care:  she is now using dilaudid 4mg ATC with spike of pain. Will add MS Contin to better control pain spikes. The plan is to wean this off when she completes RT and begins to heal.  She is agreeable to this. Opioids have causes nausea in the past, If morphine causes some nausea, she can take zofran prior to dose to help minimize this SE. Will continue dilaudid since this helpful for pain and she is tolerating this well. Discussed constipation prophylaxis.   She can increase miralax to BID and add senna daily. Can titrate to regulate BM pattern    Impression/Plan:   Pain  Morphine ER 15mg Q 12  Dilaudid 2-4mg Q 4 prn  Magic mouthwash    2. Nausea  Zofran 8mg TID  Compazine 10mg Q 6 prn  Olanzapine 5mg Q HS  Cannabis  Scopolamine Q 72    3. Constipation  Miralax BID prn  Prunes  Senna daily prn      Planned Follow up: Return in about 1 month (around 12/20/2023). Encounter Times  Reviewing/Obtaining:    minutes    Medical Exam:   minutes      Plan: 5  minutes    Notes: 5 minutes      Counseling/Education: 20 minutes    Care Coordination:  minutes      My total time spent caring for the patient on the day of the encounter: 30 minutes. The [facility] makes medical notes like these available to patients in the interest of transparency. Please be advised this is a medical document. Medical documents are intended to carry relevant information, facts as evident, and the clinical opinion of the practitioner. The medical note is intended as peer to peer communication and may appear blunt or direct. It is written in medical language and may contain abbreviations or verbiage that are unfamiliar.         Electronically Signed by:  RAIN Porras Outpatient Palliative Nurse Practitioner

## 2023-11-21 ENCOUNTER — OFFICE VISIT (OUTPATIENT)
Dept: HEMATOLOGY/ONCOLOGY | Facility: HOSPITAL | Age: 53
End: 2023-11-21
Attending: INTERNAL MEDICINE
Payer: MEDICAID

## 2023-11-21 ENCOUNTER — HOSPITAL ENCOUNTER (OUTPATIENT)
Dept: RADIATION ONCOLOGY | Facility: HOSPITAL | Age: 53
Discharge: HOME | End: 2023-11-21
Attending: INTERNAL MEDICINE
Payer: MEDICAID

## 2023-11-21 VITALS
RESPIRATION RATE: 20 BRPM | BODY MASS INDEX: 24.73 KG/M2 | WEIGHT: 134.38 LBS | DIASTOLIC BLOOD PRESSURE: 67 MMHG | HEIGHT: 61.65 IN | TEMPERATURE: 98 F | SYSTOLIC BLOOD PRESSURE: 95 MMHG | HEART RATE: 56 BPM | OXYGEN SATURATION: 99 %

## 2023-11-21 DIAGNOSIS — C32.8 MALIGNANT NEOPLASM OF OVERLAPPING SITES OF LARYNX (HCC): Primary | ICD-10-CM

## 2023-11-21 DIAGNOSIS — R11.0 NAUSEA: ICD-10-CM

## 2023-11-21 DIAGNOSIS — G89.3 NEOPLASM RELATED PAIN: ICD-10-CM

## 2023-11-21 DIAGNOSIS — C76.0 HEAD AND NECK CANCER (HCC): Primary | ICD-10-CM

## 2023-11-21 LAB
ANION GAP SERPL CALC-SCNC: 4 MMOL/L (ref 0–18)
BASOPHILS # BLD AUTO: 0.03 X10(3) UL (ref 0–0.2)
BASOPHILS NFR BLD AUTO: 1 %
BUN BLD-MCNC: 11 MG/DL (ref 9–23)
CALCIUM BLD-MCNC: 9.8 MG/DL (ref 8.5–10.1)
CHLORIDE SERPL-SCNC: 104 MMOL/L (ref 98–112)
CO2 SERPL-SCNC: 27 MMOL/L (ref 21–32)
CREAT BLD-MCNC: 0.86 MG/DL
EGFRCR SERPLBLD CKD-EPI 2021: 81 ML/MIN/1.73M2 (ref 60–?)
EOSINOPHIL # BLD AUTO: 0.07 X10(3) UL (ref 0–0.7)
EOSINOPHIL NFR BLD AUTO: 2.2 %
ERYTHROCYTE [DISTWIDTH] IN BLOOD BY AUTOMATED COUNT: 13.2 %
GLUCOSE BLD-MCNC: 110 MG/DL (ref 70–99)
HCT VFR BLD AUTO: 35.3 %
HGB BLD-MCNC: 11.5 G/DL
IMM GRANULOCYTES # BLD AUTO: 0.01 X10(3) UL (ref 0–1)
IMM GRANULOCYTES NFR BLD: 0.3 %
LYMPHOCYTES # BLD AUTO: 0.37 X10(3) UL (ref 1–4)
LYMPHOCYTES NFR BLD AUTO: 11.8 %
MCH RBC QN AUTO: 30.3 PG (ref 26–34)
MCHC RBC AUTO-ENTMCNC: 32.6 G/DL (ref 31–37)
MCV RBC AUTO: 92.9 FL
MONOCYTES # BLD AUTO: 0.3 X10(3) UL (ref 0.1–1)
MONOCYTES NFR BLD AUTO: 9.6 %
NEUTROPHILS # BLD AUTO: 2.35 X10 (3) UL (ref 1.5–7.7)
NEUTROPHILS # BLD AUTO: 2.35 X10(3) UL (ref 1.5–7.7)
NEUTROPHILS NFR BLD AUTO: 75.1 %
OSMOLALITY SERPL CALC.SUM OF ELEC: 280 MOSM/KG (ref 275–295)
PLATELET # BLD AUTO: 160 10(3)UL (ref 150–450)
POTASSIUM SERPL-SCNC: 4.2 MMOL/L (ref 3.5–5.1)
RBC # BLD AUTO: 3.8 X10(6)UL
SODIUM SERPL-SCNC: 135 MMOL/L (ref 136–145)
WBC # BLD AUTO: 3.1 X10(3) UL (ref 4–11)

## 2023-11-21 PROCEDURE — 80048 BASIC METABOLIC PNL TOTAL CA: CPT

## 2023-11-21 PROCEDURE — 96413 CHEMO IV INFUSION 1 HR: CPT

## 2023-11-21 PROCEDURE — 96375 TX/PRO/DX INJ NEW DRUG ADDON: CPT

## 2023-11-21 PROCEDURE — 77386 HC IMRT COMPLEX: CPT | Performed by: RADIOLOGY

## 2023-11-21 PROCEDURE — 85025 COMPLETE CBC W/AUTO DIFF WBC: CPT

## 2023-11-21 PROCEDURE — 96367 TX/PROPH/DG ADDL SEQ IV INF: CPT

## 2023-11-21 PROCEDURE — 96361 HYDRATE IV INFUSION ADD-ON: CPT

## 2023-11-21 PROCEDURE — 96366 THER/PROPH/DIAG IV INF ADDON: CPT

## 2023-11-21 PROCEDURE — 99215 OFFICE O/P EST HI 40 MIN: CPT | Performed by: NURSE PRACTITIONER

## 2023-11-21 PROCEDURE — 96376 TX/PRO/DX INJ SAME DRUG ADON: CPT

## 2023-11-21 RX ORDER — LORAZEPAM 2 MG/ML
0.5 INJECTION INTRAMUSCULAR ONCE
Status: CANCELLED | OUTPATIENT
Start: 2023-11-21 | End: 2023-11-21

## 2023-11-21 RX ORDER — LIDOCAINE HYDROCHLORIDE 20 MG/ML
SOLUTION OROPHARYNGEAL
COMMUNITY
Start: 2023-11-13 | End: 2023-11-22

## 2023-11-21 RX ORDER — SODIUM CHLORIDE 9 MG/ML
1000 INJECTION, SOLUTION INTRAVENOUS ONCE
Status: COMPLETED | OUTPATIENT
Start: 2023-11-21 | End: 2023-11-21

## 2023-11-21 RX ORDER — LORAZEPAM 2 MG/ML
0.5 INJECTION INTRAMUSCULAR ONCE
Status: COMPLETED | OUTPATIENT
Start: 2023-11-21 | End: 2023-11-21

## 2023-11-21 RX ORDER — PALONOSETRON 0.05 MG/ML
0.25 INJECTION, SOLUTION INTRAVENOUS ONCE
Status: COMPLETED | OUTPATIENT
Start: 2023-11-21 | End: 2023-11-21

## 2023-11-21 RX ORDER — FAMOTIDINE 40 MG/1
TABLET, FILM COATED ORAL
COMMUNITY
Start: 2023-11-17

## 2023-11-21 RX ADMIN — SODIUM CHLORIDE 1000 ML: 9 INJECTION, SOLUTION INTRAVENOUS at 14:06:00

## 2023-11-21 RX ADMIN — PALONOSETRON 0.25 MG: 0.05 INJECTION, SOLUTION INTRAVENOUS at 09:27:00

## 2023-11-21 RX ADMIN — LORAZEPAM 0.5 MG: 2 INJECTION INTRAMUSCULAR at 09:28:00

## 2023-11-21 NOTE — PROGRESS NOTES
Pt here for C1D36 Drug(s)Cisplatin. Arrives Ambulating independently, accompanied by Self     Patient was evaluated today by SHAHRIAR. Oral medications included in this regimen:  no    Patient confirms comprehension of cancer treatment schedule:  yes    Pregnancy screening:  Denies possibility of pregnancy    Modifications in dose or schedule:  No    Medications appearance and physical integrity checked by RN: yes. Chemotherapy IV pump settings verified by 2 RNs:  Yes. Frequency of blood return and site check throughout administration: Prior to administration     Infusion/treatment outcome:  patient tolerated treatment without incident    Education Record    Learner:  Patient  Barriers / Limitations:  None  Method:  Discussion  Education / instructions given:  Scheduling  Outcome:  Shows understanding    Discharged Home, Ambulating independently, accompanied by:Self    Patient/family verbalized understanding of future appointments: by printed AVS    Pt tolerated treatment without incident. Increased nausea today - ativan given additionally to other pre-meds with good effect. Left in stable condition. Appt in place for next weeks chemo.

## 2023-11-21 NOTE — PROGRESS NOTES
Patient here for C1D36 Cisplatin. Patient states that she has pain 10/10 in throat. Patient states she took hydromorphone at 0530 this morning with no relief. Patient states that she is eating ok, appetite is down, adequate fluid intake. Patient states she has constant nausea with no relief from zofran/compazine and scopolamine patch. Patient states that she has consistent constipation. Patient taking Miralax with no relief. Patient accompanied by .      Education Record    Learner:  Patient and Spouse    Disease / Diagnosis: malignant neoplasm of larynx     Barriers / Limitations:  None   Comments:    Method:  Discussion   Comments:    General Topics:  Diet, Medication, Pain, Side effects and symptom management, and Plan of care reviewed   Comments:    Outcome:  Shows understanding   Comments:

## 2023-11-22 PROCEDURE — 77386 HC IMRT COMPLEX: CPT | Performed by: RADIOLOGY

## 2023-11-22 PROCEDURE — 77336 RADIATION PHYSICS CONSULT: CPT | Performed by: RADIOLOGY

## 2023-11-22 RX ORDER — CYCLOBENZAPRINE HCL 5 MG
5 TABLET ORAL 3 TIMES DAILY PRN
Qty: 30 TABLET | Refills: 0 | OUTPATIENT
Start: 2023-11-22

## 2023-11-24 NOTE — PROGRESS NOTES
Oncology Nutrition F/UConsultation     Patient Name: Richard Sarabia  YOB: 1970  Medical Record Number: [id number]            Account Number: [de-identified]  Dietitian: May Montgomery RD, LDN     Date of visit: 11/21/2023     Diet Rx: high protein/calorie, soft as tolerated     Pertinent Dx/PMH: T2N0M0 squamous cell of the larynx      TX: concurrent cisplatin/RT (thru 12/5/23)     Other pertinent subjective/objective information: noted pt reporting 10-15 lb unplanned wt loss; diet/sx/activity hx obtained     Pertinent Meds:     Current Outpatient Medications:     pantoprazole 40 MG Oral Tab EC, Take 1 tablet (40 mg total) by mouth 2 (two) times daily. , Disp: , Rfl:     OLANZapine 5 MG Oral Tab, Take 1 tablet (5 mg total) by mouth nightly., Disp: 30 tablet, Rfl: 1    ondansetron (ZOFRAN) 8 MG tablet, Take 1 tablet (8 mg total) by mouth every 8 (eight) hours as needed for Nausea., Disp: 30 tablet, Rfl: 3    prochlorperazine (COMPAZINE) 10 mg tablet, Take 1 tablet (10 mg total) by mouth every 6 (six) hours as needed for Nausea., Disp: 30 tablet, Rfl: 3    levoFLOXacin 500 MG Oral Tab, Take 1 tablet (500 mg total) by mouth daily. , Disp: , Rfl:     maalox/diphenhydramine/lidocaine Oral Suspension, Take 5 mL by mouth 4 (four) times daily before meals and nightly., Disp: 500 mL, Rfl: 1    cyanocobalamin 1000 MCG Oral Tab, Take 1 tablet (1,000 mcg total) by mouth daily. , Disp: , Rfl:     omeprazole 20 MG Oral Capsule Delayed Release, Take 1 capsule (20 mg total) by mouth every morning before breakfast., Disp: , Rfl:     buPROPion HCl ER, Smoking Det, 150 MG Oral Tablet 12 Hr, Take 1 tablet (150 mg total) by mouth 2 (two) times daily. , Disp: , Rfl:     HYDROcodone-acetaminophen  MG Oral Tab, Take 1-2 tablets by mouth every 4 (four) hours as needed for Pain., Disp: 120 tablet, Rfl: 0    lisinopril 20 MG Oral Tab, Take 1 tablet (20 mg total) by mouth daily. , Disp: , Rfl:     MONTELUKAST 10 MG Oral Tab, TAKE 1 TABLET(10 MG) BY MOUTH EVERY NIGHT, Disp: 90 tablet, Rfl: 0    albuterol 108 (90 Base) MCG/ACT Inhalation Aero Soln, Inhale 2 puffs into the lungs every 4 (four) hours as needed. , Disp: 54 g, Rfl: 1    Budesonide-Formoterol Fumarate (SYMBICORT) 160-4.5 MCG/ACT Inhalation Aerosol, INHALE 2 PUFFS BY MOUTH EVERY MORNING, Disp: 1 each, Rfl: 5    fluticasone propionate 50 MCG/ACT Nasal Suspension, SHAKE LIQUID AND USE 2 SPRAYS IN EACH NOSTRIL DAILY, Disp: 48 g, Rfl: 1    ALPRAZolam 0.5 MG Oral Tab, Take 1 tablet (0.5 mg total) by mouth 3 (three) times daily as needed. , Disp: 90 tablet, Rfl: 0    EPINEPHrine 0.3 MG/0.3ML Injection Solution Auto-injector, Inject 0.3 mL (1 each total) as directed one time. Injectf into the muscle one time as needed for anaphylaxis for up to 2 doses, Disp: , Rfl:     benzonatate 200 MG Oral Cap, Take 1 capsule (200 mg total) by mouth 3 (three) times daily as needed for cough. , Disp: 50 capsule, Rfl: 1    Simethicone (GAS-X OR), daily. , Disp: , Rfl:     albuterol (VENTOLIN) (2.5 MG/3ML) 0.083% Inhalation Nebu Soln, Take 3 mL (2.5 mg total) by nebulization every 6 (six) hours as needed. , Disp: , Rfl:      Pertinent Labs: noted     Height: 5'1.65\"                     IBW: 110 +/- 10%     WT HX:   11/21/23:  61 kg (134 lb 6.4 oz)  11/14/23:  62.6 kg (138 lb)  10/31/23:  64.6 kg (142 lb 6.4 oz)  10/24/23:  63.1 kg (139 lb 3.2 oz)  10/17/23 : 62.3 kg (137 lb 6.4 oz)  10/05/23 : 63 kg (139 lb)  10/06/23 : 63 kg (139 lb)  10/04/23 : 63.6 kg (140 lb 3.2 oz)  10/04/23 : 63.3 kg (139 lb 9.6 oz)  10/03/23 : 64.4 kg (142 lb)  05/16/23 : 64.9 kg (143 lb)  11/17/21 : 69.9 kg (154 lb)  09/29/21 : 70.3 kg (155 lb)        Estimated Nutrition Needs: 25-30 kcals/kg = 6712-5493 KCALS/d; 1.5 gms protein/kg = 93 gms/d    SERVICES PROVIDED: written materials on nutrition w/ nausea      Assessment/Plan: RD f/u w/ pt and life partner in RT exam room today.      Pt continues to work w/ palliative care NP who is adjusting pain medications. Pt continues to c/o significant pain w/ swallow and nausea. RD provided written information as noted encouraging pt to consume more smoothie/shake-like foods and ONS. Both pt and support person verbalized understanding. RD continues to offer support/encouragement and will continue to work with throughout tx. The [facility] makes medical notes like these available to patients in the interest of transparency. Please be advised this is a medical document. Medical documents are intended to carry relevant information, facts as evident, and the clinical opinion of the practitioner. The medical note is intended as peer to peer communication and may appear blunt or direct. It is written in medical language and may contain abbreviations or verbiage that are unfamiliar.

## 2023-11-28 ENCOUNTER — OFFICE VISIT (OUTPATIENT)
Dept: HEMATOLOGY/ONCOLOGY | Facility: HOSPITAL | Age: 53
End: 2023-11-28
Attending: INTERNAL MEDICINE
Payer: MEDICAID

## 2023-11-28 VITALS
BODY MASS INDEX: 24.11 KG/M2 | DIASTOLIC BLOOD PRESSURE: 69 MMHG | SYSTOLIC BLOOD PRESSURE: 106 MMHG | HEIGHT: 61.65 IN | TEMPERATURE: 97 F | OXYGEN SATURATION: 99 % | RESPIRATION RATE: 18 BRPM | WEIGHT: 131 LBS | HEART RATE: 128 BPM

## 2023-11-28 DIAGNOSIS — C76.0 HEAD AND NECK CANCER (HCC): Primary | ICD-10-CM

## 2023-11-28 LAB
ANION GAP SERPL CALC-SCNC: 7 MMOL/L (ref 0–18)
BASOPHILS # BLD AUTO: 0.02 X10(3) UL (ref 0–0.2)
BASOPHILS NFR BLD AUTO: 1 %
BUN BLD-MCNC: 10 MG/DL (ref 9–23)
CALCIUM BLD-MCNC: 9.3 MG/DL (ref 8.5–10.1)
CHLORIDE SERPL-SCNC: 100 MMOL/L (ref 98–112)
CO2 SERPL-SCNC: 26 MMOL/L (ref 21–32)
CREAT BLD-MCNC: 0.73 MG/DL
EGFRCR SERPLBLD CKD-EPI 2021: 98 ML/MIN/1.73M2 (ref 60–?)
EOSINOPHIL # BLD AUTO: 0.07 X10(3) UL (ref 0–0.7)
EOSINOPHIL NFR BLD AUTO: 3.6 %
ERYTHROCYTE [DISTWIDTH] IN BLOOD BY AUTOMATED COUNT: 13.7 %
GLUCOSE BLD-MCNC: 106 MG/DL (ref 70–99)
HCT VFR BLD AUTO: 31.7 %
HGB BLD-MCNC: 10.7 G/DL
IMM GRANULOCYTES # BLD AUTO: 0 X10(3) UL (ref 0–1)
IMM GRANULOCYTES NFR BLD: 0 %
LYMPHOCYTES # BLD AUTO: 0.33 X10(3) UL (ref 1–4)
LYMPHOCYTES NFR BLD AUTO: 17 %
MCH RBC QN AUTO: 31.1 PG (ref 26–34)
MCHC RBC AUTO-ENTMCNC: 33.8 G/DL (ref 31–37)
MCV RBC AUTO: 92.2 FL
MONOCYTES # BLD AUTO: 0.33 X10(3) UL (ref 0.1–1)
MONOCYTES NFR BLD AUTO: 17 %
NEUTROPHILS # BLD AUTO: 1.19 X10 (3) UL (ref 1.5–7.7)
NEUTROPHILS # BLD AUTO: 1.19 X10(3) UL (ref 1.5–7.7)
NEUTROPHILS NFR BLD AUTO: 61.4 %
OSMOLALITY SERPL CALC.SUM OF ELEC: 275 MOSM/KG (ref 275–295)
PLATELET # BLD AUTO: 142 10(3)UL (ref 150–450)
POTASSIUM SERPL-SCNC: 4 MMOL/L (ref 3.5–5.1)
RBC # BLD AUTO: 3.44 X10(6)UL
SODIUM SERPL-SCNC: 133 MMOL/L (ref 136–145)
WBC # BLD AUTO: 1.9 X10(3) UL (ref 4–11)

## 2023-11-28 PROCEDURE — 96366 THER/PROPH/DIAG IV INF ADDON: CPT

## 2023-11-28 PROCEDURE — 85025 COMPLETE CBC W/AUTO DIFF WBC: CPT

## 2023-11-28 PROCEDURE — 96367 TX/PROPH/DG ADDL SEQ IV INF: CPT

## 2023-11-28 PROCEDURE — 96361 HYDRATE IV INFUSION ADD-ON: CPT

## 2023-11-28 PROCEDURE — 96413 CHEMO IV INFUSION 1 HR: CPT

## 2023-11-28 PROCEDURE — 77386 HC IMRT COMPLEX: CPT | Performed by: RADIOLOGY

## 2023-11-28 PROCEDURE — 80048 BASIC METABOLIC PNL TOTAL CA: CPT

## 2023-11-28 PROCEDURE — 96375 TX/PRO/DX INJ NEW DRUG ADDON: CPT

## 2023-11-28 PROCEDURE — 99215 OFFICE O/P EST HI 40 MIN: CPT | Performed by: INTERNAL MEDICINE

## 2023-11-28 PROCEDURE — 96376 TX/PRO/DX INJ SAME DRUG ADON: CPT

## 2023-11-28 RX ORDER — PREDNISONE 10 MG/1
40 TABLET ORAL DAILY
Qty: 20 TABLET | Refills: 0 | Status: SHIPPED | OUTPATIENT
Start: 2023-11-28 | End: 2023-12-03

## 2023-11-28 RX ORDER — NICOTINE 21 MG/24HR
1 PATCH, TRANSDERMAL 24 HOURS TRANSDERMAL EVERY 24 HOURS
COMMUNITY
Start: 2023-10-25 | End: 2023-12-24

## 2023-11-28 RX ORDER — LORAZEPAM 2 MG/ML
0.5 INJECTION INTRAMUSCULAR ONCE
Status: CANCELLED | OUTPATIENT
Start: 2023-11-28 | End: 2023-11-28

## 2023-11-28 RX ORDER — SODIUM CHLORIDE 9 MG/ML
1000 INJECTION, SOLUTION INTRAVENOUS ONCE
Status: COMPLETED | OUTPATIENT
Start: 2023-11-28 | End: 2023-11-28

## 2023-11-28 RX ORDER — LORAZEPAM 2 MG/ML
0.5 INJECTION INTRAMUSCULAR ONCE
Status: COMPLETED | OUTPATIENT
Start: 2023-11-28 | End: 2023-11-28

## 2023-11-28 RX ORDER — AZITHROMYCIN 250 MG/1
TABLET, FILM COATED ORAL
Qty: 6 TABLET | Refills: 0 | Status: SHIPPED | OUTPATIENT
Start: 2023-11-28

## 2023-11-28 RX ORDER — CYCLOBENZAPRINE HCL 5 MG
5 TABLET ORAL 3 TIMES DAILY PRN
Qty: 60 TABLET | Refills: 1 | Status: SHIPPED | OUTPATIENT
Start: 2023-11-28

## 2023-11-28 RX ORDER — PALONOSETRON 0.05 MG/ML
0.25 INJECTION, SOLUTION INTRAVENOUS ONCE
Status: COMPLETED | OUTPATIENT
Start: 2023-11-28 | End: 2023-11-28

## 2023-11-28 RX ADMIN — PALONOSETRON 0.25 MG: 0.05 INJECTION, SOLUTION INTRAVENOUS at 09:29:00

## 2023-11-28 RX ADMIN — SODIUM CHLORIDE 1000 ML: 9 INJECTION, SOLUTION INTRAVENOUS at 13:59:00

## 2023-11-28 RX ADMIN — LORAZEPAM 0.5 MG: 2 INJECTION INTRAMUSCULAR at 09:20:00

## 2023-11-28 NOTE — PATIENT INSTRUCTIONS
1) Schedule PET in 12 weeks     2) See ENT doctors in 4-8 weeks     3)  prescription for Z pack and prednisone, follow directions for administration. Contact MD if condition does not improve.

## 2023-11-28 NOTE — PROGRESS NOTES
Pt here for C1D43 Drug(s)Cisplatin. Arrives Ambulating independently, accompanied by Life Partner    Patient was evaluated today by MD.    Oral medications included in this regimen:  no    Patient confirms comprehension of cancer treatment schedule:  yes    Pregnancy screening:  Not applicable    Modifications in dose or schedule:  No    Medications appearance and physical integrity checked by RN: yes. Chemotherapy IV pump settings verified by 2 RNs:  Yes. Frequency of blood return and site check throughout administration: Prior to administration and At completion of therapy     Infusion/treatment outcome:  patient tolerated treatment without incident    Education Record    Learner:  Patient and Life Partner  Barriers / Limitations:  None  Method:  Discussion  Education / instructions given:   Today's regime  Outcome:  Shows understanding    Discharged Home, Ambulating independently, accompanied by: Life Partner    Patient/family verbalized understanding of future appointments: by printed AVS

## 2023-11-28 NOTE — PROGRESS NOTES
Outpatient Oncology Care Plan   Problem list:   fatigue   Problems related to:   side effect of treatment   Interventions:   provided general teaching   Expected outcomes:   understands plan of care   Progress towards outcome: making progress     Education Record   Learner: Patient   Barriers / Limitations: None   Method: Discussion   Outcome: Shows understanding   Comments:  Patient here for follow-up and planned C1D43 treatment. Energy levels are low as well as appetite levels. Nausea and pain managed. Taking senna for constipation. Having symptoms of an URI.

## 2023-11-29 PROCEDURE — 77386 HC IMRT COMPLEX: CPT | Performed by: RADIOLOGY

## 2023-11-30 ENCOUNTER — HOSPITAL ENCOUNTER (OUTPATIENT)
Dept: RADIATION ONCOLOGY | Facility: HOSPITAL | Age: 53
Discharge: HOME | End: 2023-11-30
Attending: RADIOLOGY
Payer: MEDICAID

## 2023-11-30 VITALS
WEIGHT: 135 LBS | HEART RATE: 100 BPM | OXYGEN SATURATION: 96 % | RESPIRATION RATE: 20 BRPM | TEMPERATURE: 98 F | BODY MASS INDEX: 25 KG/M2 | DIASTOLIC BLOOD PRESSURE: 82 MMHG | SYSTOLIC BLOOD PRESSURE: 133 MMHG

## 2023-11-30 DIAGNOSIS — C32.8 MALIGNANT NEOPLASM OF OVERLAPPING SITES OF LARYNX (HCC): Primary | ICD-10-CM

## 2023-11-30 PROCEDURE — 77386 HC IMRT COMPLEX: CPT | Performed by: RADIOLOGY

## 2023-12-01 ENCOUNTER — HOSPITAL ENCOUNTER (OUTPATIENT)
Dept: RADIATION ONCOLOGY | Facility: HOSPITAL | Age: 53
Discharge: HOME | End: 2023-12-01
Attending: RADIOLOGY
Payer: MEDICAID

## 2023-12-01 PROCEDURE — 77336 RADIATION PHYSICS CONSULT: CPT | Performed by: RADIOLOGY

## 2023-12-01 PROCEDURE — 77386 HC IMRT COMPLEX: CPT | Performed by: RADIOLOGY

## 2023-12-01 RX ORDER — HYDROMORPHONE HYDROCHLORIDE 4 MG/1
4 TABLET ORAL EVERY 4 HOURS PRN
Qty: 90 TABLET | Refills: 0 | Status: SHIPPED | OUTPATIENT
Start: 2023-12-01 | End: 2023-12-04

## 2023-12-04 ENCOUNTER — OFFICE VISIT (OUTPATIENT)
Dept: HEMATOLOGY/ONCOLOGY | Facility: HOSPITAL | Age: 53
End: 2023-12-04
Attending: INTERNAL MEDICINE
Payer: MEDICAID

## 2023-12-04 DIAGNOSIS — C76.0 HEAD AND NECK CANCER (HCC): ICD-10-CM

## 2023-12-04 DIAGNOSIS — R11.0 CHEMOTHERAPY-INDUCED NAUSEA: ICD-10-CM

## 2023-12-04 DIAGNOSIS — T45.1X5A CHEMOTHERAPY-INDUCED NAUSEA: ICD-10-CM

## 2023-12-04 DIAGNOSIS — Z51.5 PALLIATIVE CARE BY SPECIALIST: ICD-10-CM

## 2023-12-04 DIAGNOSIS — G89.3 NEOPLASM RELATED PAIN: Primary | ICD-10-CM

## 2023-12-04 PROCEDURE — 99214 OFFICE O/P EST MOD 30 MIN: CPT | Performed by: NURSE PRACTITIONER

## 2023-12-04 PROCEDURE — 77386 HC IMRT COMPLEX: CPT | Performed by: RADIOLOGY

## 2023-12-04 RX ORDER — BENZONATATE 100 MG/1
100 CAPSULE ORAL 3 TIMES DAILY PRN
Qty: 60 CAPSULE | Refills: 0 | Status: SHIPPED | OUTPATIENT
Start: 2023-12-04

## 2023-12-04 RX ORDER — SCOLOPAMINE TRANSDERMAL SYSTEM 1 MG/1
1 PATCH, EXTENDED RELEASE TRANSDERMAL
Qty: 10 PATCH | Refills: 0 | Status: SHIPPED | OUTPATIENT
Start: 2023-12-04

## 2023-12-04 RX ORDER — ONDANSETRON 8 MG/1
8 TABLET, ORALLY DISINTEGRATING ORAL EVERY 8 HOURS PRN
Qty: 30 TABLET | Refills: 1 | Status: SHIPPED | OUTPATIENT
Start: 2023-12-04

## 2023-12-04 RX ORDER — OLANZAPINE 5 MG/1
5 TABLET ORAL NIGHTLY
Qty: 30 TABLET | Refills: 0 | Status: SHIPPED | OUTPATIENT
Start: 2023-12-04

## 2023-12-04 RX ORDER — HYDROMORPHONE HYDROCHLORIDE 4 MG/1
4 TABLET ORAL EVERY 4 HOURS PRN
Qty: 90 TABLET | Refills: 0 | Status: SHIPPED | OUTPATIENT
Start: 2023-12-04

## 2023-12-04 NOTE — PROGRESS NOTES
Palliative Care Follow Up Note     Patient Name: Syed Thompson   YOB: 1970   Medical Record Number: [id number]   CSN: [id number]   Date of visit: 12/4/2023     Chief Complaint/Reason for Visit:  Pain follow up     History of Present Illness:         Syed Thompson is a 48year old female with larynx cancer receiving chemo/RT. Her will complete RT on Thursday. She is here today complaining of worsening pain in mouth and with swallowing. She is also having neck pain from skin excoriation from RT. She is frustrated with dysgeusia, pain and nausea. She has felt daily nausea since completing chemo last week. She is using dilaudid 4mg Q 4. This may include a dose during the night if she wakes up. She continues to complain of thick sputum that makes her cough and she always feels like choking. She finds benzonadate and scopolamine are helpful for this. She feels her nausea is controlled with aggressive use of zofran and compazine. She continues to struggle with constipation and finds miralax and prunes daily helpful. She is able to drink and is forcing herself to eat soft foods.             Problem List:  Patient Active Problem List   Diagnosis    Asthma    Deviated nasal septum    GERD (gastroesophageal reflux disease)    Tobacco abuse    Fibroadenoma    Verruca    Sinusitis    Knee contusion    Esophageal spasm    Iron deficiency anemia due to chronic blood loss    Sinusitis, acute    Subacute pansinusitis    Iron deficiency    Mass of breast, right    Breast tenderness    Chronic bronchitis (HCC)    Persistent cough    Fatigue, unspecified type    Menorrhagia with regular cycle    Anxiety    Epiglottic lesion    Seasonal allergies    Sore throat    Head and neck cancer ([address]    Malignant neoplasm of overlapping sites of larynx [facility])    Palliative care by specialist    HTN (hypertension)    Nausea      Medical History:  Past Medical History:   Diagnosis Date    Anxiety 12/27/2017    BLOOD DISORDER     anemia    Esophageal reflux     Extrinsic asthma, unspecified     Head and neck cancer ([facility] [address] 10/04/2023    High blood pressure     HTN (hypertension) 9/11/2023    Hx of motion sickness     Irritable bowel syndrome     Migraines     PONV (postoperative nausea and vomiting)     \"violently ill\" at [facility] after Anesthesia; UI next month didn't have a problem, Anesthesia said was probably from the gas used at [facility] with swallowing     5/3/23 pt states has been limited to soft foods d/t growth in throat; denies difficulty swallowing liquids    Reflux     Ulcer      Surgical History:  Past Surgical History:   Procedure Laterality Date    BENIGN BIOPSY RIGHT  05/22/2013    FA    ENDOMETRIAL ABLATION      EXCISION TURBINATE,SUBMUCOUS  08/14/2013    Procedure: ADENOIDECTOMY;  Surgeon: Gay Morrison MD;  Location: [address]   08/14/2013    Procedure: ADENOIDECTOMY;  Surgeon: Gay Morrison MD;  Location: [address] [place] [address], [facility] polypectomy x2    OTHER SURGICAL HISTORY      liposuction    REMOVAL ADENOIDS,PRIMARY,12+ Y/O  08/14/2013    Procedure: ENDOSCOPIC SUBMUCOUS RESECTION INFERIOR TURBINATES;  Surgeon: Gay Morrison MD;  Location: [facility]  08/14/2013    Procedure: SEPTOPLASTY NASAL;  Surgeon: Gay Morrison MD;  Location: [address] [place] [address]       Allergies: Allergies   Allergen Reactions    Latex RASH and OTHER (SEE COMMENTS)    Ceftin [Cefuroxime] RASH    Codeine     Oxycodone     Vicodin [Hydrocodone-Acetaminophen] OTHER (SEE COMMENTS)     Per patient allergy was noted 20 years ago, uncofirmed if true allergy     Clindamycin RASH    Penicillins RASH       Palliative Care Social History:    Marital Status:  she is .   Milana Reginald is her support person    Functional History:    ADLs: Independent. She works part-time as a     Medications:  Current Outpatient Medications   Medication Sig Dispense Refill    benzonatate 100 MG Oral Cap Take 1 capsule (100 mg total) by mouth 3 (three) times daily as needed for cough. 60 capsule 0    HYDROmorphone 4 MG Oral Tab Take 1 tablet (4 mg total) by mouth every 4 (four) hours as needed for Pain. 90 tablet 0    OLANZapine 5 MG Oral Tab Take 1 tablet (5 mg total) by mouth nightly. 30 tablet 0    ondansetron 8 MG Oral Tablet Dispersible Take 1 tablet (8 mg total) by mouth every 8 (eight) hours as needed for Nausea. 30 tablet 1    Scopolamine 1.5mg TD patch 1mg/3days Place 1 patch onto the skin every third day. 10 patch 0    nicotine 14 MG/24HR Transdermal Patch 24 Hr Place 1 patch onto the skin daily. (Patient not taking: Reported on 11/28/2023)      azithromycin 250 MG Oral Tab Take 2 tablets on day 1 then 1 tablet daily 6 tablet 0    cyclobenzaprine 5 MG Oral Tab Take 1 tablet (5 mg total) by mouth 3 (three) times daily as needed for Muscle spasms. 60 tablet 1    maalox/diphenhydramine/lidocaine Oral Suspension SWISH AND SPIT 5 ML FOUR TIMES DAILY BEFORE A MEAL AND EVERY NIGHT AT BEDTIME 500 mL 1    famotidine 40 MG Oral Tab       morphINE ER 15 MG Oral Tab CR Take 1 tablet (15 mg total) by mouth every 12 (twelve) hours. 60 tablet 0    HYDROmorphone 2 MG Oral Tab Take 1-2 tablets (2-4 mg total) by mouth every 4 (four) hours as needed for Pain. 90 tablet 0    polyethylene glycol, PEG 3350, (MIRALAX) 17 GM/SCOOP Oral Powder Take 17 g by mouth 2 (two) times daily as needed. (Patient not taking: Reported on 11/28/2023)      ondansetron (ZOFRAN) 8 MG tablet Take 1 tablet (8 mg total) by mouth every 8 (eight) hours as needed for Nausea. 30 tablet 3    prochlorperazine (COMPAZINE) 10 mg tablet Take 1 tablet (10 mg total) by mouth every 6 (six) hours as needed for Nausea.  30 tablet 3    guaiFENesin-codeine 100-10 MG/5ML Oral Solution Take 10 mL by mouth every 6 (six) hours as needed. (Patient not taking: Reported on 11/28/2023)      Mometasone Furoate 0.1 % External Cream Apply twice daily to affected area 45 g 1    sennosides 8.6 MG Oral Tab Take 1 tablet (8.6 mg total) by mouth daily. 60 tablet 0    pantoprazole 40 MG Oral Tab EC Take 1 tablet (40 mg total) by mouth 2 (two) times daily. ALPRAZOLAM 0.5 MG Oral Tab Take 1 tab po 30 minute before radiation daily 30 tablet 0    cyanocobalamin 1000 MCG Oral Tab Take 1 tablet (1,000 mcg total) by mouth daily. buPROPion HCl ER, Smoking Det, 150 MG Oral Tablet 12 Hr Take 1 tablet (150 mg total) by mouth 2 (two) times daily. lisinopril 20 MG Oral Tab Take 1 tablet (20 mg total) by mouth daily. MONTELUKAST 10 MG Oral Tab TAKE 1 TABLET(10 MG) BY MOUTH EVERY NIGHT 90 tablet 0    albuterol 108 (90 Base) MCG/ACT Inhalation Aero Soln Inhale 2 puffs into the lungs every 4 (four) hours as needed. 54 g 1    Budesonide-Formoterol Fumarate (SYMBICORT) 160-4.5 MCG/ACT Inhalation Aerosol INHALE 2 PUFFS BY MOUTH EVERY MORNING 1 each 5    fluticasone propionate 50 MCG/ACT Nasal Suspension SHAKE LIQUID AND USE 2 SPRAYS IN EACH NOSTRIL DAILY 48 g 1    EPINEPHrine 0.3 MG/0.3ML Injection Solution Auto-injector Inject 0.3 mL (1 each total) as directed one time. Injectf into the muscle one time as needed for anaphylaxis for up to 2 doses (Patient not taking: Reported on 11/28/2023)      Simethicone (GAS-X OR) daily. (Patient not taking: Reported on 11/28/2023)         Review of Systems:  General:  Fatigue. Feels well. Respiratory:  Denies SOB, denies cough  Cardiac:  Denies chest pain, heart palpitations  Abdomen:  Denies constipation, diarrhea. Denies pain. Psych:  No complaints. Sleeping well    Palliative Performance Scale:  100 %    Physical Examination:  General: Patient is alert and oriented, not in acute distress.   Skin:  R side of neck with reddened skin in RT field  Respiratory: Normal excursions and effort  Cardiac:   No edema  Abdomen: Soft, non tender   Musculoskeletal: Normal gait. Psych:  Mood/Affect appropriate    Advanced Directives Discussed and Completed:     HCPOA/Health Surrogate: There is no completed HCPOA documentation on file in [facility]. Pain was focus today    Palliative Care:  she feels current pain plan is effective. Will not increase morphine ER due to nausea and issues with opioid related nausea in the past.  She feels she is tolerating this current plan with minimal SE. She can continue using dilaudid 4mg for BTP. Discussed that pain should start improving a couple of weeks after RT is completed and can be re-evaluated. She is eager to wean off meds. The goal is to begin weaning once pain begins to improve with healing after RT completion. She will continue nausea meds. Impression/Plan:   Pain  Morphine ER 15mg Q 12  Dilaudid 4mg Q 4 prn  Magic mouthwash    2. Nausea  Zofran 8mg TID  Compazine 10mg Q 6 prn  Olanzapine 5mg Q HS  Cannabis  Scopolamine Q 72    3. Constipation  Miralax BID prn  Prunes  Senna daily prn      Planned Follow up: Return in about 1 month (around 1/4/2024). Encounter Times  Reviewing/Obtaining:    minutes    Medical Exam:   minutes      Plan: 5  minutes    Notes: 5 minutes      Counseling/Education: 20 minutes    Care Coordination:  minutes      My total time spent caring for the patient on the day of the encounter: 30 minutes. The [facility] makes medical notes like these available to patients in the interest of transparency. Please be advised this is a medical document. Medical documents are intended to carry relevant information, facts as evident, and the clinical opinion of the practitioner. The medical note is intended as peer to peer communication and may appear blunt or direct. It is written in medical language and may contain abbreviations or verbiage that are unfamiliar.         Electronically Signed by:  [place] Karen Outpatient Palliative Nurse Practitioner

## 2023-12-05 PROCEDURE — 77386 HC IMRT COMPLEX: CPT | Performed by: RADIOLOGY

## 2023-12-06 PROCEDURE — 77386 HC IMRT COMPLEX: CPT | Performed by: RADIOLOGY

## 2023-12-07 ENCOUNTER — DOCUMENTATION ONLY (OUTPATIENT)
Dept: RADIATION ONCOLOGY | Facility: HOSPITAL | Age: 53
End: 2023-12-07

## 2023-12-07 PROCEDURE — 77336 RADIATION PHYSICS CONSULT: CPT | Performed by: RADIOLOGY

## 2023-12-07 PROCEDURE — 77386 HC IMRT COMPLEX: CPT | Performed by: RADIOLOGY

## 2023-12-12 DIAGNOSIS — R11.0 CHEMOTHERAPY-INDUCED NAUSEA: ICD-10-CM

## 2023-12-12 DIAGNOSIS — T45.1X5A CHEMOTHERAPY-INDUCED NAUSEA: ICD-10-CM

## 2023-12-12 DIAGNOSIS — C76.0 HEAD AND NECK CANCER (HCC): ICD-10-CM

## 2023-12-12 RX ORDER — PROCHLORPERAZINE MALEATE 10 MG
10 TABLET ORAL EVERY 6 HOURS PRN
Qty: 30 TABLET | Refills: 3 | Status: SHIPPED | OUTPATIENT
Start: 2023-12-12

## 2023-12-12 NOTE — PROGRESS NOTES
[facility]     PATIENT:  Clarence Collins MD: Jazmin Lepe. 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   F/u in 2 mo  F/u with ENT in 2 mo  PET per med onc  Consider swallow study and speech evaluation    Amy Damian M.D. Radiation Oncology    CC:  MD Eli Covington MD

## 2023-12-15 RX ORDER — BENZONATATE 100 MG/1
100 CAPSULE ORAL 3 TIMES DAILY PRN
Qty: 60 CAPSULE | Refills: 0 | Status: SHIPPED | OUTPATIENT
Start: 2023-12-15

## 2023-12-15 RX ORDER — CYCLOBENZAPRINE HCL 5 MG
5 TABLET ORAL 3 TIMES DAILY PRN
Qty: 60 TABLET | Refills: 1 | Status: SHIPPED | OUTPATIENT
Start: 2023-12-15

## 2023-12-15 RX ORDER — MORPHINE SULFATE 15 MG/1
15 TABLET, FILM COATED, EXTENDED RELEASE ORAL EVERY 12 HOURS SCHEDULED
Qty: 60 TABLET | Refills: 0 | Status: SHIPPED | OUTPATIENT
Start: 2023-12-15 | End: 2024-01-14

## 2023-12-15 NOTE — TELEPHONE ENCOUNTER
Pt's boyfriend is calling for refill on medications please call him back when they are sent- [phone]

## 2023-12-23 NOTE — PROGRESS NOTES
[facility] Note    Diagnosis: cT2 cN2 cM0 Laryngeal SCCa, p16+    Treatment History:   ChemoRT with weekly Cisplatin: 10/31/23-12/7/23    Visit Diagnosis:  No diagnosis found.      History of Present Illness: 53F was referred by Dr. Floyd for [id number] squamous cell of the larynx.     -She has had 1 year of throat pain, R>L. This was associated with otalgia. She lost 10-15 lb. She has direct laryngoscopy x 2 that was negative. However, she noted more dysphagia and weight loss. Imaging from 9/13/23 was concerning for a suprglottic mass not involving the  pre-epiglottic or paraglottic space. No neck nodes. CT chest negative. Repeat ENT evaluation was consistent with Squamous cell carcinoma. She had a PET/CT done on 10/3/23 at [facility]-Noted to have bilateral cervical LN involvement. She has a 40 pack year smoking history and still smokes 1 PPD.     -Her case was discussed at [facility] and primary surgery vs. Primary RT were discussed for cT2 cN0 cM0 disease. She opted for primary RT. However this discussion was done before her PET results.    -On 10/3/23, I met her for the first time. We discussed her PET/CT results from earlier today showing bilateral cervical Frederic disease. We discussed that primary RT alone would not be recommended. We discussed that we should consider surgery, induction chemotherapy or chemoRT. She states that she is adamantly against chemotherapy. She states that the pain is the worse part. No current dysphagia. She eventually was agreeable to chemoRT.     -ChemoRT with weekly Cisplatin: 10/31/23-Current   10/17/23  10/24/23  10/31/23  11/7/23  11/14/23  11/21/23  11/28/23  RT finished on 12/7/23    Interval History:1/2/24  -RT finished on 12/7/24  -Smoking   -ENT  -PET on 3/2/24    Review of Systems: 12 Point ROS was completed and pertinent positives are in the HPI    Current Outpatient Medications on File Prior to Visit   Medication Sig Dispense Refill    morphINE ER 15 MG Oral Tab  CR Take 1 tablet (15 mg total) by mouth every 12 (twelve) hours. 60 tablet 0    cyclobenzaprine 5 MG Oral Tab Take 1 tablet (5 mg total) by mouth 3 (three) times daily as needed for Muscle spasms. 60 tablet 1    benzonatate 100 MG Oral Cap Take 1 capsule (100 mg total) by mouth 3 (three) times daily as needed for cough. 60 capsule 0    prochlorperazine (COMPAZINE) 10 mg tablet Take 1 tablet (10 mg total) by mouth every 6 (six) hours as needed for Nausea. 30 tablet 3    HYDROmorphone 4 MG Oral Tab Take 1 tablet (4 mg total) by mouth every 4 (four) hours as needed for Pain. 90 tablet 0    OLANZapine 5 MG Oral Tab Take 1 tablet (5 mg total) by mouth nightly. 30 tablet 0    ondansetron 8 MG Oral Tablet Dispersible Take 1 tablet (8 mg total) by mouth every 8 (eight) hours as needed for Nausea. 30 tablet 1    Scopolamine 1.5mg TD patch 1mg/3days Place 1 patch onto the skin every third day. 10 patch 0    nicotine 14 MG/24HR Transdermal Patch 24 Hr Place 1 patch onto the skin daily. (Patient not taking: Reported on 2023)      azithromycin 250 MG Oral Tab Take 2 tablets on day 1 then 1 tablet daily 6 tablet 0    [] predniSONE 10 MG Oral Tab Take 4 tablets (40 mg total) by mouth daily for 5 days. 20 tablet 0    maalox/diphenhydramine/lidocaine Oral Suspension SWISH AND SPIT 5 ML FOUR TIMES DAILY BEFORE A MEAL AND EVERY NIGHT AT BEDTIME 500 mL 1    famotidine 40 MG Oral Tab       HYDROmorphone 2 MG Oral Tab Take 1-2 tablets (2-4 mg total) by mouth every 4 (four) hours as needed for Pain. 90 tablet 0    polyethylene glycol, PEG 3350, (MIRALAX) 17 GM/SCOOP Oral Powder Take 17 g by mouth 2 (two) times daily as needed. (Patient not taking: Reported on 2023)      ondansetron (ZOFRAN) 8 MG tablet Take 1 tablet (8 mg total) by mouth every 8 (eight) hours as needed for Nausea. 30 tablet 3    guaiFENesin-codeine 100-10 MG/5ML Oral Solution Take 10 mL by mouth every 6 (six) hours as needed. (Patient not taking:  Reported on 2023)      Mometasone Furoate 0.1 % External Cream Apply twice daily to affected area 45 g 1    sennosides 8.6 MG Oral Tab Take 1 tablet (8.6 mg total) by mouth daily. 60 tablet 0    pantoprazole 40 MG Oral Tab EC Take 1 tablet (40 mg total) by mouth 2 (two) times daily.      ALPRAZOLAM 0.5 MG Oral Tab Take 1 tab po 30 minute before radiation daily 30 tablet 0    cyanocobalamin 1000 MCG Oral Tab Take 1 tablet (1,000 mcg total) by mouth daily.      [] nicotine 7 MG/24HR Transdermal Patch 24 Hr Place 1 patch onto the skin daily.      buPROPion HCl ER, Smoking Det, 150 MG Oral Tablet 12 Hr Take 1 tablet (150 mg total) by mouth 2 (two) times daily.      lisinopril 20 MG Oral Tab Take 1 tablet (20 mg total) by mouth daily.      MONTELUKAST 10 MG Oral Tab TAKE 1 TABLET(10 MG) BY MOUTH EVERY NIGHT 90 tablet 0    albuterol 108 (90 Base) MCG/ACT Inhalation Aero Soln Inhale 2 puffs into the lungs every 4 (four) hours as needed. 54 g 1    Budesonide-Formoterol Fumarate (SYMBICORT) 160-4.5 MCG/ACT Inhalation Aerosol INHALE 2 PUFFS BY MOUTH EVERY MORNING 1 each 5    fluticasone propionate 50 MCG/ACT Nasal Suspension SHAKE LIQUID AND USE 2 SPRAYS IN EACH NOSTRIL DAILY 48 g 1    EPINEPHrine 0.3 MG/0.3ML Injection Solution Auto-injector Inject 0.3 mL (1 each total) as directed one time. Injectf into the muscle one time as needed for anaphylaxis for up to 2 doses (Patient not taking: Reported on 2023)      Simethicone (GAS-X OR) daily. (Patient not taking: Reported on 2023)       Current Facility-Administered Medications on File Prior to Visit   Medication Dose Route Frequency Provider Last Rate Last Admin    [COMPLETED] LORazepam (Ativan) 2 mg/mL injection 0.5 mg  0.5 mg Intravenous Once Marielle Louise MD   0.5 mg at 23 0920    [COMPLETED] fosaprepitant (Emend) 150 mg in sodium chloride 0.9% 150 mL IVPB  150 mg Intravenous Once Marielle Louise MD   Stopped at 23 0960     [COMPLETED] palonosetron (Aloxi) 0.25 mg/5mL injection  0.25 mg Intravenous Once Marielle Louise MD   0.25 mg at 11/28/23 0929    [COMPLETED] dexAMETHasone (Decadron) 20 mg in sodium chloride 0.9% 102 mL IVPB   Intravenous Once Marielle Louise MD   Stopped at 11/28/23 1010    [COMPLETED] potassium chloride 20 mEq, magnesium sulfate 4 g in sodium chloride 0.9% 1,018 mL IVPB (Onc)   Intravenous Once Marielle Louise MD   Stopped at 11/28/23 1211    [COMPLETED] mannitol 12.5 g in sodium chloride 0.9% 100 mL infusion  12.5 g Intravenous Once Marielle Louise MD   Stopped at 11/28/23 1359    [COMPLETED] CISplatin (Platinol) 40 mg/m2 = 65 mg in sodium chloride 0.9% 315 mL infusion  40 mg/m2 (Treatment Plan Recorded) Intravenous Once Marielle Louise MD   Stopped at 11/28/23 1337    [COMPLETED] mannitol 12.5 g in sodium chloride 0.9% 100 mL infusion  12.5 g Intravenous Once Marielle Louise MD   Stopped at 11/28/23 1228    [COMPLETED] sodium chloride 0.9% infusion 1,000 mL  1,000 mL Intravenous Once Marielle Louise MD   Stopped at 11/28/23 1546     Past Medical History:   Diagnosis Date    Anxiety 12/27/2017    BLOOD DISORDER     anemia    Esophageal reflux     Extrinsic asthma, unspecified     Head and neck cancer (HCC) 10/04/2023    High blood pressure     HTN (hypertension) 9/11/2023    Hx of motion sickness     Irritable bowel syndrome     Migraines     PONV (postoperative nausea and vomiting)     \"violently ill\" at [facility] after Anesthesia; UI next month didn't have a problem, Anesthesia said was probably from the gas used at [place]    Problems with swallowing     5/3/23 pt states has been limited to soft foods d/t growth in throat; denies difficulty swallowing liquids    Reflux     Ulcer      Past Surgical History:   Procedure Laterality Date    BENIGN BIOPSY RIGHT  05/22/2013    FA    ENDOMETRIAL ABLATION      EXCISION TURBINATE,SUBMUCOUS  08/14/2013    Procedure: ADENOIDECTOMY;  Surgeon: Mc Ayala,  MD;  Location: [facility]    EXCISION TURBINATE,SUBMUCOUS  2013    Procedure: ADENOIDECTOMY;  Surgeon: Mc Ayala MD;  Location: [facility]    HC  SECTION LEVEL I      1990    OTHER SURGICAL HISTORY      CS, esphogel polypectomy x2    OTHER SURGICAL HISTORY      liposuction    REMOVAL ADENOIDS,PRIMARY,12+ Y/O  2013    Procedure: ENDOSCOPIC SUBMUCOUS RESECTION INFERIOR TURBINATES;  Surgeon: Mc Ayala MD;  Location: [facility]    REPAIR OF NASAL SEPTUM  2013    Procedure: SEPTOPLASTY NASAL;  Surgeon: Mc Ayala MD;  Location: [facility]     Social History     Socioeconomic History    Marital status:     Number of children: 1   Occupational History    Occupation: [employer]     Comment: working 3 days per week   Tobacco Use    Smoking status: Every Day     Packs/day: 0.50     Years: 30.00     Additional pack years: 0.00     Total pack years: 15.00     Types: Cigarettes    Smokeless tobacco: Former     Types: Chew     Quit date: 2008   Vaping Use    Vaping Use: Never used   Substance and Sexual Activity    Alcohol use: Not Currently     Alcohol/week: 5.0 standard drinks of alcohol     Types: 6 Standard drinks or equivalent per week     Comment: 10/06/2023 - Patient no longer consumes alcohol    Drug use: No      Family History   Problem Relation Age of Onset    Cancer Father         esophageal/lung    Breast Cancer Paternal Grandmother 60        age 60's       Physical Exam  Height: --  Weight: --  BSA (Calculated - sq m): --  Pulse: --  BP: --  Temp: --  Do Not Use - Resp Rate: --  SpO2: --    General: NAD, AOX3  HEENT: clear op, mmm, no jvd, no scleral icterus  LN: R cervical fullness   CV: RRR S1S2 no murmurs  Extremities: No edema   Lungs: bilateral wheezing, no increased work of breathing  Abd: soft nt nd +BS no hepatosplenomegaly  Neuro: CN: II-XII grossly intact      Results:  Lab Results   Component Value Date    WBC  1.9 (L) 11/28/2023    HGB 10.7 (L) 11/28/2023    HCT 31.7 (L) 11/28/2023    MCV 92.2 11/28/2023    .0 (L) 11/28/2023     Lab Results   Component Value Date     (L) 11/28/2023    K 4.0 11/28/2023    CO2 26.0 11/28/2023     11/28/2023    BUN 10 11/28/2023    GLUCOSE 91 10/17/2014    ALB 3.3 (L) 10/17/2023       No results found for: \"LDH\"    Radiology: reviewed   PET/CT   1. Large markedly hypermetabolic mass centered at the right epiglottis with involvement of the right aryepiglottic fold and piriform sinus, consistent with the known malignancy.   2. Bilateral FDG-avid cervical lymph nodes, as detailed above, suspicious for metastatic involvement.   3. There is an FDG avid node positioned anterior to the right-sided , corresponding to a small enhancing focus seen on the previous CT, is of uncertain clinical significance. Attention on follow-up imaging.   4. No FDG PET evidence of distant metastases.     Addendum    Tumor cells are positive for p16 (>75% of tumor cells)  Original diagnosis remains unchanged       Pathology: reviewed   A. Larynx, Supraglottic Mass; Biopsy:  Superficial fragments of squamous cell carcinoma with necrosis     B. Larynx, Supraglottic Mass; Larynx, Biopsy:  Fragments of moderately to poorly differentiated invasive squamous cell carcinoma  See comment    Assessment and Plan:  cT2 cN2 cM0 Laryngeal SCCa, p16+  -we reviewed NCCN guidelines and discussed primary surgery, chemoRT or induction chemotherapy. She initially stated that she is adamantly against chemotherapy and does not want chemotherapy. She eventually agreed wito chemoRT with weekly Cisplatin 40 mg/m2 which finished on 12/7/23  -CBC, CMP, TSH and Mg today  -PET/CT on 3/2/24-12 weeks post RT ordered   -She is still smoking which may complicate her treatment  -PRN IVF  -Repeat ENT exam 4-8 weeks after treatment      Cancer related pain: Morphine ER    Nausea: Medical marijuana     MDM: high: head and neck  cancer    [facility]

## 2023-12-27 ENCOUNTER — APPOINTMENT (OUTPATIENT)
Dept: HEMATOLOGY/ONCOLOGY | Facility: HOSPITAL | Age: 53
End: 2023-12-27
Attending: INTERNAL MEDICINE
Payer: MEDICAID

## 2023-12-27 ENCOUNTER — TELEPHONE (OUTPATIENT)
Dept: HEMATOLOGY/ONCOLOGY | Facility: HOSPITAL | Age: 53
End: 2023-12-27

## 2023-12-27 DIAGNOSIS — C76.0 HEAD AND NECK CANCER (HCC): ICD-10-CM

## 2023-12-27 DIAGNOSIS — T45.1X5A CHEMOTHERAPY-INDUCED NAUSEA: ICD-10-CM

## 2023-12-27 DIAGNOSIS — R11.0 CHEMOTHERAPY-INDUCED NAUSEA: ICD-10-CM

## 2023-12-27 RX ORDER — PROCHLORPERAZINE MALEATE 10 MG
10 TABLET ORAL EVERY 6 HOURS PRN
Qty: 30 TABLET | Refills: 3 | Status: CANCELLED | OUTPATIENT
Start: 2023-12-27

## 2023-12-27 RX ORDER — SENNOSIDES 8.6 MG
8.6 TABLET ORAL DAILY
Qty: 60 TABLET | Refills: 0 | Status: CANCELLED | OUTPATIENT
Start: 2023-12-27

## 2023-12-27 RX ORDER — HYDROMORPHONE HYDROCHLORIDE 4 MG/1
4 TABLET ORAL EVERY 4 HOURS PRN
Qty: 90 TABLET | Refills: 0 | Status: SHIPPED | OUTPATIENT
Start: 2023-12-27

## 2023-12-27 RX ORDER — CYCLOBENZAPRINE HCL 5 MG
5 TABLET ORAL 3 TIMES DAILY PRN
Qty: 60 TABLET | Refills: 1 | Status: CANCELLED | OUTPATIENT
Start: 2023-12-27

## 2023-12-27 RX ORDER — BENZONATATE 100 MG/1
100 CAPSULE ORAL 3 TIMES DAILY PRN
Qty: 60 CAPSULE | Refills: 0 | Status: CANCELLED | OUTPATIENT
Start: 2023-12-27

## 2023-12-27 NOTE — TELEPHONE ENCOUNTER
Patient calling and doesn't think she can be there in time for the 930am appt    Asking if Maira has other appt  available today    Please call patient to advise

## 2024-01-02 ENCOUNTER — APPOINTMENT (OUTPATIENT)
Dept: HEMATOLOGY/ONCOLOGY | Age: 54
End: 2024-01-02
Attending: INTERNAL MEDICINE
Payer: MEDICAID

## 2024-01-02 ENCOUNTER — OFFICE VISIT (OUTPATIENT)
Dept: HEMATOLOGY/ONCOLOGY | Facility: HOSPITAL | Age: 54
End: 2024-01-02
Attending: INTERNAL MEDICINE
Payer: MEDICAID

## 2024-01-02 DIAGNOSIS — C76.0 HEAD AND NECK CANCER (HCC): Primary | ICD-10-CM

## 2024-01-03 ENCOUNTER — OFFICE VISIT (OUTPATIENT)
Dept: HEMATOLOGY/ONCOLOGY | Facility: HOSPITAL | Age: 54
End: 2024-01-03
Attending: INTERNAL MEDICINE
Payer: MEDICAID

## 2024-01-03 VITALS
TEMPERATURE: 98 F | DIASTOLIC BLOOD PRESSURE: 69 MMHG | BODY MASS INDEX: 23 KG/M2 | OXYGEN SATURATION: 100 % | WEIGHT: 122 LBS | SYSTOLIC BLOOD PRESSURE: 94 MMHG | RESPIRATION RATE: 18 BRPM | HEART RATE: 131 BPM

## 2024-01-03 DIAGNOSIS — R11.0 NAUSEA: ICD-10-CM

## 2024-01-03 DIAGNOSIS — C76.0 HEAD AND NECK CANCER (HCC): ICD-10-CM

## 2024-01-03 DIAGNOSIS — Z51.5 PALLIATIVE CARE BY SPECIALIST: ICD-10-CM

## 2024-01-03 DIAGNOSIS — G89.3 NEOPLASM RELATED PAIN: Primary | ICD-10-CM

## 2024-01-03 PROCEDURE — 99214 OFFICE O/P EST MOD 30 MIN: CPT | Performed by: NURSE PRACTITIONER

## 2024-01-03 RX ORDER — PROCHLORPERAZINE MALEATE 10 MG
10 TABLET ORAL EVERY 6 HOURS PRN
Qty: 30 TABLET | Refills: 3 | Status: SHIPPED | OUTPATIENT
Start: 2024-01-03

## 2024-01-03 RX ORDER — ONDANSETRON 8 MG/1
8 TABLET, ORALLY DISINTEGRATING ORAL EVERY 8 HOURS PRN
Qty: 30 TABLET | Refills: 1 | Status: SHIPPED | OUTPATIENT
Start: 2024-01-03

## 2024-01-03 RX ORDER — BENZONATATE 100 MG/1
100 CAPSULE ORAL 3 TIMES DAILY PRN
Qty: 60 CAPSULE | Refills: 0 | Status: SHIPPED | OUTPATIENT
Start: 2024-01-03

## 2024-01-03 NOTE — PATIENT INSTRUCTIONS
Stop hydromorphone  Use tylenol for pain if needed    Stop cyclobenzaprine    Take morphine today  Starting tomorrow (Thursday) take night dose of morphine and stop morning dose  Take for daily for 3 days.(Thursday, Friday, Saturday, Sunday)  Monday - stop morphine.    Continue tylenol as needed for pain

## 2024-01-03 NOTE — PROGRESS NOTES
Palliative Care Follow Up Note     Patient Name: Jeannette Mccollum   YOB: 1970   Medical Record Number: [id number]   [facility]: [id number]   Date of visit: 1/3/2024     Chief Complaint/Reason for Visit:  Pain follow up     History of Present Illness:         Jeannette Mccollum is a 53 year old female with larynx cancer who completed chemo/RT lat month.  She is here today still complaining of nausea.  She feels the dilaudid and thick phlegm worsens this.  She does feel her oral and throat pain have improved now that treatment is completed.  She is frustrated with dysgeusia, pain and nausea.   She continues to use the zofran and compazine with benefit.  She has weaned herself from the dilaudid 2 days ago and is using tylenol for BTP.  She feels her pain remains well controlled and her nausea isn't as intense.   She is sleeping at night.  She continues to use the morphine with benefit.  She continues to complain of thick sputum that makes her cough and she always feels like choking.  She finds benzonadate and scopolamine are helpful for this.   She continues to struggle with constipation and finds miralax and prunes helpful.   She is relieved to be finally feeling a little better.             Problem List:  Patient Active Problem List   Diagnosis    Asthma    Deviated nasal septum    GERD (gastroesophageal reflux disease)    Tobacco abuse    Fibroadenoma    Verruca    Sinusitis    Knee contusion    Esophageal spasm    Iron deficiency anemia due to chronic blood loss    Sinusitis, acute    Subacute pansinusitis    Iron deficiency    Mass of breast, right    Breast tenderness    Chronic bronchitis (HCC)    Persistent cough    Fatigue, unspecified type    Menorrhagia with regular cycle    Anxiety    Epiglottic lesion    Seasonal allergies    Sore throat    Head and neck cancer (HCC)    Malignant neoplasm of overlapping sites of larynx (HCC)    Palliative care by specialist    HTN (hypertension)     Nausea      Medical History:  Past Medical History:   Diagnosis Date    Anxiety 2017    BLOOD DISORDER     anemia    Esophageal reflux     Extrinsic asthma, unspecified     Head and neck cancer (HCC) 10/04/2023    High blood pressure     HTN (hypertension) 2023    Hx of motion sickness     Irritable bowel syndrome     Migraines     PONV (postoperative nausea and vomiting)     \"violently ill\" at [facility] after Anesthesia; UI next month didn't have a problem, Anesthesia said was probably from the gas used at [place]    Problems with swallowing     5/3/23 pt states has been limited to soft foods d/t growth in throat; denies difficulty swallowing liquids    Reflux     Ulcer      Surgical History:  Past Surgical History:   Procedure Laterality Date    BENIGN BIOPSY RIGHT  2013    FA    ENDOMETRIAL ABLATION      EXCISION TURBINATE,SUBMUCOUS  2013    Procedure: ADENOIDECTOMY;  Surgeon: Mc Ayala MD;  Location: [facility]    EXCISION TURBINATE,SUBMUCOUS  2013    Procedure: ADENOIDECTOMY;  Surgeon: Mc Ayala MD;  Location: [facility]  SECTION LEVEL I      1990    OTHER SURGICAL HISTORY      CS, esphogel polypectomy x2    OTHER SURGICAL HISTORY      liposuction    REMOVAL ADENOIDS,PRIMARY,12+ Y/O  2013    Procedure: ENDOSCOPIC SUBMUCOUS RESECTION INFERIOR TURBINATES;  Surgeon: Mc Ayala MD;  Location: [facility]    REPAIR OF NASAL SEPTUM  2013    Procedure: SEPTOPLASTY NASAL;  Surgeon: Mc Ayala MD;  Location: [facility]       Allergies:  Allergies   Allergen Reactions    Latex RASH and OTHER (SEE COMMENTS)    Ceftin [Cefuroxime] RASH    Codeine     Oxycodone     Vicodin [Hydrocodone-Acetaminophen] OTHER (SEE COMMENTS)     Per patient allergy was noted 20 years ago, uncofirmed if true allergy     Clindamycin RASH    Penicillins RASH       Palliative Care Social History:    Marital Status:  she is  .  Ayad is her support person    Functional History:    ADLs: Independent.  She works part-time as a     Medications:  Current Outpatient Medications   Medication Sig Dispense Refill    benzonatate 100 MG Oral Cap Take 1 capsule (100 mg total) by mouth 3 (three) times daily as needed for cough. 60 capsule 0    ondansetron 8 MG Oral Tablet Dispersible Take 1 tablet (8 mg total) by mouth every 8 (eight) hours as needed for Nausea. 30 tablet 1    prochlorperazine (COMPAZINE) 10 mg tablet Take 1 tablet (10 mg total) by mouth every 6 (six) hours as needed for Nausea. 30 tablet 3    HYDROmorphone 4 MG Oral Tab Take 1 tablet (4 mg total) by mouth every 4 (four) hours as needed for Pain. 90 tablet 0    morphINE ER 15 MG Oral Tab CR Take 1 tablet (15 mg total) by mouth every 12 (twelve) hours. 60 tablet 0    cyclobenzaprine 5 MG Oral Tab Take 1 tablet (5 mg total) by mouth 3 (three) times daily as needed for Muscle spasms. 60 tablet 1    OLANZapine 5 MG Oral Tab Take 1 tablet (5 mg total) by mouth nightly. 30 tablet 0    Scopolamine 1.5mg TD patch 1mg/3days Place 1 patch onto the skin every third day. 10 patch 0    azithromycin 250 MG Oral Tab Take 2 tablets on day 1 then 1 tablet daily 6 tablet 0    maalox/diphenhydramine/lidocaine Oral Suspension SWISH AND SPIT 5 ML FOUR TIMES DAILY BEFORE A MEAL AND EVERY NIGHT AT BEDTIME 500 mL 1    famotidine 40 MG Oral Tab       HYDROmorphone 2 MG Oral Tab Take 1-2 tablets (2-4 mg total) by mouth every 4 (four) hours as needed for Pain. 90 tablet 0    polyethylene glycol, PEG 3350, (MIRALAX) 17 GM/SCOOP Oral Powder Take 17 g by mouth 2 (two) times daily as needed. (Patient not taking: Reported on 11/28/2023)      ondansetron (ZOFRAN) 8 MG tablet Take 1 tablet (8 mg total) by mouth every 8 (eight) hours as needed for Nausea. 30 tablet 3    guaiFENesin-codeine 100-10 MG/5ML Oral Solution Take 10 mL by mouth every 6 (six) hours as needed. (Patient not taking: Reported  on 11/28/2023)      Mometasone Furoate 0.1 % External Cream Apply twice daily to affected area 45 g 1    sennosides 8.6 MG Oral Tab Take 1 tablet (8.6 mg total) by mouth daily. 60 tablet 0    pantoprazole 40 MG Oral Tab EC Take 1 tablet (40 mg total) by mouth 2 (two) times daily.      ALPRAZOLAM 0.5 MG Oral Tab Take 1 tab po 30 minute before radiation daily 30 tablet 0    cyanocobalamin 1000 MCG Oral Tab Take 1 tablet (1,000 mcg total) by mouth daily.      buPROPion HCl ER, Smoking Det, 150 MG Oral Tablet 12 Hr Take 1 tablet (150 mg total) by mouth 2 (two) times daily.      lisinopril 20 MG Oral Tab Take 1 tablet (20 mg total) by mouth daily.      MONTELUKAST 10 MG Oral Tab TAKE 1 TABLET(10 MG) BY MOUTH EVERY NIGHT 90 tablet 0    albuterol 108 (90 Base) MCG/ACT Inhalation Aero Soln Inhale 2 puffs into the lungs every 4 (four) hours as needed. 54 g 1    Budesonide-Formoterol Fumarate (SYMBICORT) 160-4.5 MCG/ACT Inhalation Aerosol INHALE 2 PUFFS BY MOUTH EVERY MORNING 1 each 5    fluticasone propionate 50 MCG/ACT Nasal Suspension SHAKE LIQUID AND USE 2 SPRAYS IN EACH NOSTRIL DAILY 48 g 1    EPINEPHrine 0.3 MG/0.3ML Injection Solution Auto-injector Inject 0.3 mL (1 each total) as directed one time. Injectf into the muscle one time as needed for anaphylaxis for up to 2 doses (Patient not taking: Reported on 11/28/2023)      Simethicone (GAS-X OR) daily. (Patient not taking: Reported on 11/28/2023)         Review of Systems:  General:  Fatigue.  Feels well.    Respiratory:  Denies SOB, denies cough  Cardiac:  Denies chest pain, heart palpitations  Abdomen:  Denies constipation, diarrhea.  Denies pain.  Nausea today  Psych:  No complaints.  Sleeping well    Palliative Performance Scale:  100 %    Physical Examination:  General: Patient is alert and oriented, not in acute distress.  Skin:  intact  Respiratory: Normal excursions and effort  Cardiac:   No edema  Abdomen: Soft, non tender   Musculoskeletal: Normal gait.    Psych:  Mood/Affect appropriate    Advanced Directives Discussed and Completed:     HCPOA/Health Surrogate:      There is no completed HCPOA documentation on file in [facility].  Pain was focus today    Palliative Care:   pain is improving so will continue weaning opioids.  Will wean off MS Contin over this week.  She can continue to use acetaminophen as needed for pain.  She will continue zofran and compazine as needed for nausea  She will stop scopolamine after next patch as nausea and phlegm are improving.      Impression/Plan:   Pain  Morphine ER 15mg daily for a week then stop  Acetaminophen 1G Q 6 prn    2. Nausea  Zofran 8mg TID  Compazine 10mg Q 6 prn  Cannabis  Scopolamine Q 72    3. Constipation  Miralax BID prn  Prunes  Senna daily prn      Planned Follow up: Return in about 1 month (around 2/3/2024).    Encounter Times  Reviewing/Obtaining:    minutes    Medical Exam:   minutes      Plan: 5  minutes    Notes: 5 minutes      Counseling/Education: 20 minutes    Care Coordination:  minutes      My total time spent caring for the patient on the day of the encounter: 30 minutes.        The 21st Century Cures Act makes medical notes like these available to patients in the interest of transparency. Please be advised this is a medical document. Medical documents are intended to carry relevant information, facts as evident, and the clinical opinion of the practitioner. The medical note is intended as peer to peer communication and may appear blunt or direct. It is written in medical language and may contain abbreviations or verbiage that are unfamiliar.        Electronically Signed by:  RAIN GALAVIZ Outpatient Palliative Nurse Practitioner

## 2024-01-10 ENCOUNTER — TELEPHONE (OUTPATIENT)
Dept: RADIATION ONCOLOGY | Facility: HOSPITAL | Age: 54
End: 2024-01-10

## 2024-01-10 ENCOUNTER — APPOINTMENT (OUTPATIENT)
Dept: HEMATOLOGY/ONCOLOGY | Facility: HOSPITAL | Age: 54
End: 2024-01-10
Attending: INTERNAL MEDICINE
Payer: MEDICAID

## 2024-01-10 ENCOUNTER — APPOINTMENT (OUTPATIENT)
Dept: RADIATION ONCOLOGY | Facility: HOSPITAL | Age: 54
End: 2024-01-10
Attending: RADIOLOGY
Payer: MEDICAID

## 2024-01-10 NOTE — TELEPHONE ENCOUNTER
I called pt right back to see why she cancelled and to nakita her f/u appt. Mailbox is not set up. I will send pt a [employer] message.

## 2024-01-10 NOTE — TELEPHONE ENCOUNTER
RESCHEDULE APPOINTMENT  Priscilla Machado  : 1970  Ph: [phone]   @ 9:15 and 9:00  Rad Onc Rn  that was for today 1/10/24.  Thanks Phyllis

## 2024-01-12 ENCOUNTER — TELEPHONE (OUTPATIENT)
Dept: RADIATION ONCOLOGY | Facility: HOSPITAL | Age: 54
End: 2024-01-12

## 2024-01-12 NOTE — TELEPHONE ENCOUNTER
1/12/24 Unable to LVM for patient to reschedule a cancellation they had for Dr. Case. No Identifying name on VM

## 2024-01-16 ENCOUNTER — TELEPHONE (OUTPATIENT)
Dept: HEMATOLOGY/ONCOLOGY | Facility: HOSPITAL | Age: 54
End: 2024-01-16

## 2024-01-17 ENCOUNTER — APPOINTMENT (OUTPATIENT)
Dept: HEMATOLOGY/ONCOLOGY | Facility: HOSPITAL | Age: 54
End: 2024-01-17
Attending: INTERNAL MEDICINE
Payer: MEDICAID

## 2024-01-19 NOTE — PROGRESS NOTES
[facility] Note    Diagnosis: cT2 cN2 cM0 Laryngeal SCCa, p16+    Treatment History:   ChemoRT with weekly Cisplatin: 10/31/23-12/7/23    Visit Diagnosis:  1. Head and neck cancer (HCC)        History of Present Illness: 53F was referred by Dr. Floyd for [id number] squamous cell of the larynx.     -She has had 1 year of throat pain, R>L. This was associated with otalgia. She lost 10-15 lb. She has direct laryngoscopy x 2 that was negative. However, she noted more dysphagia and weight loss. Imaging from 9/13/23 was concerning for a suprglottic mass not involving the  pre-epiglottic or paraglottic space. No neck nodes. CT chest negative. Repeat ENT evaluation was consistent with Squamous cell carcinoma. She had a PET/CT done on 10/3/23 at [facility]-Noted to have bilateral cervical LN involvement. She has a 40 pack year smoking history and still smokes 1 PPD.     -Her case was discussed at [facility] and primary surgery vs. Primary RT were discussed for cT2 cN0 cM0 disease. She opted for primary RT. However this discussion was done before her PET results.    -On 10/3/23, I met her for the first time. We discussed her PET/CT results from earlier today showing bilateral cervical Frederic disease. We discussed that primary RT alone would not be recommended. We discussed that we should consider surgery, induction chemotherapy or chemoRT. She states that she is adamantly against chemotherapy. She states that the pain is the worse part. No current dysphagia. She eventually was agreeable to chemoRT.     -ChemoRT with weekly Cisplatin: 10/31/23-Current   10/17/23  10/24/23  10/31/23  11/7/23  11/14/23  11/21/23  11/28/23  RT finished on 12/7/23    Interval History:1/23/24  -RT finished on 12/7/24  -Still smoking but she has cut back  -She is stating to eat and drink more. Still lack of taste and glue like texture in mouth  -ENT visit scheduled   -PET on 3/1/24    Review of Systems: 12 Point ROS was completed and  pertinent positives are in the HPI    Current Outpatient Medications on File Prior to Visit   Medication Sig Dispense Refill    benzonatate 100 MG Oral Cap Take 1 capsule (100 mg total) by mouth 3 (three) times daily as needed for cough. 60 capsule 0    ondansetron 8 MG Oral Tablet Dispersible Take 1 tablet (8 mg total) by mouth every 8 (eight) hours as needed for Nausea. 30 tablet 1    morphINE ER 15 MG Oral Tab CR Take 1 tablet (15 mg total) by mouth every 12 (twelve) hours. 60 tablet 0    Scopolamine 1.5mg TD patch 1mg/3days Place 1 patch onto the skin every third day. 10 patch 0    nicotine 14 MG/24HR Transdermal Patch 24 Hr Place 1 patch onto the skin daily.      famotidine 40 MG Oral Tab       HYDROmorphone 2 MG Oral Tab Take 1-2 tablets (2-4 mg total) by mouth every 4 (four) hours as needed for Pain. 90 tablet 0    polyethylene glycol, PEG 3350, (MIRALAX) 17 GM/SCOOP Oral Powder Take 17 g by mouth 2 (two) times daily as needed.      Mometasone Furoate 0.1 % External Cream Apply twice daily to affected area 45 g 1    sennosides 8.6 MG Oral Tab Take 1 tablet (8.6 mg total) by mouth daily. 60 tablet 0    pantoprazole 40 MG Oral Tab EC Take 1 tablet (40 mg total) by mouth 2 (two) times daily.      ALPRAZOLAM 0.5 MG Oral Tab Take 1 tab po 30 minute before radiation daily 30 tablet 0    lisinopril 20 MG Oral Tab Take 1 tablet (20 mg total) by mouth daily.      MONTELUKAST 10 MG Oral Tab TAKE 1 TABLET(10 MG) BY MOUTH EVERY NIGHT 90 tablet 0    albuterol 108 (90 Base) MCG/ACT Inhalation Aero Soln Inhale 2 puffs into the lungs every 4 (four) hours as needed. 54 g 1    Budesonide-Formoterol Fumarate (SYMBICORT) 160-4.5 MCG/ACT Inhalation Aerosol INHALE 2 PUFFS BY MOUTH EVERY MORNING 1 each 5    fluticasone propionate 50 MCG/ACT Nasal Suspension SHAKE LIQUID AND USE 2 SPRAYS IN EACH NOSTRIL DAILY 48 g 1    prochlorperazine (COMPAZINE) 10 mg tablet Take 1 tablet (10 mg total) by mouth every 6 (six) hours as needed for  Nausea. (Patient not taking: Reported on 1/23/2024) 30 tablet 3    HYDROmorphone 4 MG Oral Tab Take 1 tablet (4 mg total) by mouth every 4 (four) hours as needed for Pain. (Patient not taking: Reported on 1/23/2024) 90 tablet 0    cyclobenzaprine 5 MG Oral Tab Take 1 tablet (5 mg total) by mouth 3 (three) times daily as needed for Muscle spasms. (Patient not taking: Reported on 1/23/2024) 60 tablet 1    OLANZapine 5 MG Oral Tab Take 1 tablet (5 mg total) by mouth nightly. (Patient not taking: Reported on 1/23/2024) 30 tablet 0    maalox/diphenhydramine/lidocaine Oral Suspension SWISH AND SPIT 5 ML FOUR TIMES DAILY BEFORE A MEAL AND EVERY NIGHT AT BEDTIME (Patient not taking: Reported on 1/23/2024) 500 mL 1    ondansetron (ZOFRAN) 8 MG tablet Take 1 tablet (8 mg total) by mouth every 8 (eight) hours as needed for Nausea. (Patient not taking: Reported on 1/23/2024) 30 tablet 3    guaiFENesin-codeine 100-10 MG/5ML Oral Solution Take 10 mL by mouth every 6 (six) hours as needed. (Patient not taking: Reported on 11/28/2023)      cyanocobalamin 1000 MCG Oral Tab Take 1 tablet (1,000 mcg total) by mouth daily. (Patient not taking: Reported on 1/23/2024)      buPROPion HCl ER, Smoking Det, 150 MG Oral Tablet 12 Hr Take 1 tablet (150 mg total) by mouth 2 (two) times daily. (Patient not taking: Reported on 1/23/2024)      EPINEPHrine 0.3 MG/0.3ML Injection Solution Auto-injector Inject 0.3 mL (1 each total) as directed one time. Injectf into the muscle one time as needed for anaphylaxis for up to 2 doses (Patient not taking: Reported on 1/23/2024)      Simethicone (GAS-X OR) daily. (Patient not taking: Reported on 11/28/2023)       No current facility-administered medications on file prior to visit.     Past Medical History:   Diagnosis Date    Anxiety 12/27/2017    BLOOD DISORDER     anemia    Esophageal reflux     Extrinsic asthma, unspecified     Head and neck cancer (HCC) 10/04/2023    High blood pressure     HTN  (hypertension) 2023    Hx of motion sickness     Irritable bowel syndrome     Migraines     PONV (postoperative nausea and vomiting)     \"violently ill\" at [facility] after Anesthesia; UI next month didn't have a problem, Anesthesia said was probably from the gas used at [place]    Problems with swallowing     5/3/23 pt states has been limited to soft foods d/t growth in throat; denies difficulty swallowing liquids    Reflux     Ulcer      Past Surgical History:   Procedure Laterality Date    BENIGN BIOPSY RIGHT  2013    FA    ENDOMETRIAL ABLATION      EXCISION TURBINATE,SUBMUCOUS  2013    Procedure: ADENOIDECTOMY;  Surgeon: Mc Ayala MD;  Location: [facility]    EXCISION TURBINATE,SUBMUCOUS  2013    Procedure: ADENOIDECTOMY;  Surgeon: Mc Ayala MD;  Location: [facility]    HC  SECTION LEVEL I      1990    OTHER SURGICAL HISTORY      CS, esphogel polypectomy x2    OTHER SURGICAL HISTORY      liposuction    REMOVAL ADENOIDS,PRIMARY,12+ Y/O  2013    Procedure: ENDOSCOPIC SUBMUCOUS RESECTION INFERIOR TURBINATES;  Surgeon: Mc Ayala MD;  Location: [facility]    REPAIR OF NASAL SEPTUM  2013    Procedure: SEPTOPLASTY NASAL;  Surgeon: Mc Ayala MD;  Location: [facility]     Social History     Socioeconomic History    Marital status:     Number of children: 1   Occupational History    Occupation: [employer]     Comment: working 3 days per week   Tobacco Use    Smoking status: Every Day     Packs/day: 0.50     Years: 30.00     Additional pack years: 0.00     Total pack years: 15.00     Types: Cigarettes    Smokeless tobacco: Former     Types: Chew     Quit date: 2008   Vaping Use    Vaping Use: Never used   Substance and Sexual Activity    Alcohol use: Not Currently     Alcohol/week: 5.0 standard drinks of alcohol     Types: 6 Standard drinks or equivalent per week     Comment: 10/06/2023 - Patient no  longer consumes alcohol    Drug use: No      Family History   Problem Relation Age of Onset    Cancer Father         esophageal/lung    Breast Cancer Paternal Grandmother 60        age 60's       Physical Exam  Height: --  Weight: 54.7 kg (120 lb 9.6 oz) (01/23 0945)  BSA (Calculated - sq m): --  Pulse: 92 (01/23 0945)  BP: 123/83 (01/23 0945)  Temp: 97.7 °F (36.5 °C) (01/23 0945)  Do Not Use - Resp Rate: --  SpO2: 97 % (01/23 0945)    General: NAD, AOX3  HEENT: clear op, mmm, no jvd, no scleral icterus  LN: Neck LAD has resolved   CV: RRR S1S2 no murmurs  Extremities: No edema   Lungs: bilateral wheezing, no increased work of breathing  Abd: soft nt nd +BS no hepatosplenomegaly  Neuro: CN: II-XII grossly intact      Results:  Lab Results   Component Value Date    WBC 3.9 (L) 01/23/2024    HGB 11.2 (L) 01/23/2024    HCT 32.8 (L) 01/23/2024    MCV 95.3 01/23/2024    .0 01/23/2024     Lab Results   Component Value Date     (L) 11/28/2023    K 4.0 11/28/2023    CO2 26.0 11/28/2023     11/28/2023    BUN 10 11/28/2023    GLUCOSE 91 10/17/2014    ALB 3.3 (L) 10/17/2023       No results found for: \"LDH\"    Radiology: reviewed   PET/CT   1. Large markedly hypermetabolic mass centered at the right epiglottis with involvement of the right aryepiglottic fold and piriform sinus, consistent with the known malignancy.   2. Bilateral FDG-avid cervical lymph nodes, as detailed above, suspicious for metastatic involvement.   3. There is an FDG avid node positioned anterior to the right-sided , corresponding to a small enhancing focus seen on the previous CT, is of uncertain clinical significance. Attention on follow-up imaging.   4. No FDG PET evidence of distant metastases.     Addendum    Tumor cells are positive for p16 (>75% of tumor cells)  Original diagnosis remains unchanged       Pathology: reviewed   A. Larynx, Supraglottic Mass; Biopsy:  Superficial fragments of squamous cell carcinoma with  necrosis     B. Larynx, Supraglottic Mass; Larynx, Biopsy:  Fragments of moderately to poorly differentiated invasive squamous cell carcinoma  See comment    Assessment and Plan:  cT2 cN2 cM0 Laryngeal SCCa, p16+  -we reviewed NCCN guidelines and discussed primary surgery, chemoRT or induction chemotherapy. She initially stated that she is adamantly against chemotherapy and does not want chemotherapy. She eventually agreed wito chemoRT with weekly Cisplatin 40 mg/m2 which finished on 12/7/23  -CBC, CMP, TSH and Mg today  -PET/CT on 3/1/24-12 weeks post RT ordered   -She has cut back on smoking. Wellbutrin not covered by insurance  -PRN IVF  -Repeat ENT exam 4-8 weeks after treatment is scheduled      Cancer related pain: Morphine ER    Nausea: Medical marijuana     MDM: high: head and neck cancer    [facility]

## 2024-01-22 NOTE — PROGRESS NOTES
Nursing Follow-Up Note    Patient: Jeannette Mccollum  YOB: 1970  Age: 53 year old  Radiation Oncologist: Dr. Liane Case  Referring Physician: No ref. provider found  Chief Complaint:   Chief Complaint   Patient presents with    Follow - Up     Date: 1/22/2024    Toxicities: n/a    Vital Signs: There were no vitals taken for this visit.,   Wt Readings from Last 6 Encounters:   01/23/24 54.7 kg (120 lb 9.6 oz)   01/03/24 55.3 kg (122 lb)   11/30/23 61.2 kg (135 lb)   11/28/23 59.4 kg (131 lb)   11/21/23 61 kg (134 lb 6.4 oz)   11/20/23 61.4 kg (135 lb 6.4 oz)       Allergies:    Allergies   Allergen Reactions    Latex RASH and OTHER (SEE COMMENTS)    Ceftin [Cefuroxime] RASH    Codeine     Oxycodone     Vicodin [Hydrocodone-Acetaminophen] OTHER (SEE COMMENTS)     Per patient allergy was noted 20 years ago, uncofirmed if true allergy     Clindamycin RASH    Penicillins RASH       Nursing Note: Hx of supraglottic larynx cancer- T2 N2c SCCA p16+. Completed chemoRT 12/7/23. Here for follow up today. No recent imagine. PET ordered for March 20204. Met with Dr. Louise today. VS done at that visit. ENT appointment in February. Pt reports her skin on her ears, back of neck and face are peeling. Has had blisters on her ears.Using Cerave. No issue with swallowing. Starting to get her appetite back in the last two weeks. Taste is awful. Saliva gets thick with eating carbs. Has dry mouth.       Wt Readings from Last 6 Encounters:   01/23/24 54.7 kg (120 lb 9.6 oz)   01/03/24 55.3 kg (122 lb)   11/30/23 61.2 kg (135 lb)   11/28/23 59.4 kg (131 lb)   11/21/23 61 kg (134 lb 6.4 oz)   11/20/23 61.4 kg (135 lb 6.4 oz)        .

## 2024-01-23 ENCOUNTER — NURSE ONLY (OUTPATIENT)
Dept: HEMATOLOGY/ONCOLOGY | Facility: HOSPITAL | Age: 54
End: 2024-01-23
Attending: INTERNAL MEDICINE
Payer: MEDICAID

## 2024-01-23 ENCOUNTER — HOSPITAL ENCOUNTER (OUTPATIENT)
Dept: RADIATION ONCOLOGY | Facility: HOSPITAL | Age: 54
Discharge: HOME | End: 2024-01-23
Attending: RADIOLOGY
Payer: MEDICAID

## 2024-01-23 VITALS
TEMPERATURE: 98 F | WEIGHT: 120.63 LBS | OXYGEN SATURATION: 97 % | BODY MASS INDEX: 22 KG/M2 | SYSTOLIC BLOOD PRESSURE: 123 MMHG | HEART RATE: 92 BPM | DIASTOLIC BLOOD PRESSURE: 83 MMHG | RESPIRATION RATE: 18 BRPM

## 2024-01-23 DIAGNOSIS — C76.0 HEAD AND NECK CANCER (HCC): Primary | ICD-10-CM

## 2024-01-23 DIAGNOSIS — C32.8 MALIGNANT NEOPLASM OF OVERLAPPING SITES OF LARYNX (HCC): Primary | ICD-10-CM

## 2024-01-23 DIAGNOSIS — C76.0 HEAD AND NECK CANCER (HCC): ICD-10-CM

## 2024-01-23 LAB
ALBUMIN SERPL-MCNC: 3.5 G/DL (ref 3.4–5)
ALBUMIN/GLOB SERPL: 1.3 {RATIO} (ref 1–2)
ALP LIVER SERPL-CCNC: 65 U/L
ANION GAP SERPL CALC-SCNC: 4 MMOL/L (ref 0–18)
AST SERPL-CCNC: 9 U/L (ref 15–37)
BASOPHILS # BLD AUTO: 0.02 X10(3) UL (ref 0–0.2)
BASOPHILS NFR BLD AUTO: 0.5 %
BILIRUB SERPL-MCNC: 0.3 MG/DL (ref 0.1–2)
BUN BLD-MCNC: 16 MG/DL (ref 9–23)
CALCIUM BLD-MCNC: 8.8 MG/DL (ref 8.5–10.1)
CHLORIDE SERPL-SCNC: 112 MMOL/L (ref 98–112)
CO2 SERPL-SCNC: 24 MMOL/L (ref 21–32)
CREAT BLD-MCNC: 0.95 MG/DL
EGFRCR SERPLBLD CKD-EPI 2021: 72 ML/MIN/1.73M2 (ref 60–?)
EOSINOPHIL # BLD AUTO: 0.13 X10(3) UL (ref 0–0.7)
EOSINOPHIL NFR BLD AUTO: 3.4 %
ERYTHROCYTE [DISTWIDTH] IN BLOOD BY AUTOMATED COUNT: 16.7 %
FASTING STATUS PATIENT QL REPORTED: NO
GLOBULIN PLAS-MCNC: 2.7 G/DL (ref 2.8–4.4)
GLUCOSE BLD-MCNC: 97 MG/DL (ref 70–99)
HCT VFR BLD AUTO: 32.8 %
HGB BLD-MCNC: 11.2 G/DL
IMM GRANULOCYTES # BLD AUTO: 0.01 X10(3) UL (ref 0–1)
IMM GRANULOCYTES NFR BLD: 0.3 %
LYMPHOCYTES # BLD AUTO: 0.46 X10(3) UL (ref 1–4)
LYMPHOCYTES NFR BLD AUTO: 11.9 %
MAGNESIUM SERPL-MCNC: 2.4 MG/DL (ref 1.6–2.6)
MCH RBC QN AUTO: 32.6 PG (ref 26–34)
MCHC RBC AUTO-ENTMCNC: 34.1 G/DL (ref 31–37)
MCV RBC AUTO: 95.3 FL
MONOCYTES # BLD AUTO: 0.26 X10(3) UL (ref 0.1–1)
MONOCYTES NFR BLD AUTO: 6.8 %
NEUTROPHILS # BLD AUTO: 2.97 X10 (3) UL (ref 1.5–7.7)
NEUTROPHILS # BLD AUTO: 2.97 X10(3) UL (ref 1.5–7.7)
NEUTROPHILS NFR BLD AUTO: 77.1 %
OSMOLALITY SERPL CALC.SUM OF ELEC: 291 MOSM/KG (ref 275–295)
PLATELET # BLD AUTO: 215 10(3)UL (ref 150–450)
POTASSIUM SERPL-SCNC: 3.8 MMOL/L (ref 3.5–5.1)
PROT SERPL-MCNC: 6.2 G/DL (ref 6.4–8.2)
RBC # BLD AUTO: 3.44 X10(6)UL
SODIUM SERPL-SCNC: 140 MMOL/L (ref 136–145)
TSI SER-ACNC: 1.76 MIU/ML (ref 0.36–3.74)
WBC # BLD AUTO: 3.9 X10(3) UL (ref 4–11)

## 2024-01-23 PROCEDURE — 83735 ASSAY OF MAGNESIUM: CPT

## 2024-01-23 PROCEDURE — 85025 COMPLETE CBC W/AUTO DIFF WBC: CPT

## 2024-01-23 PROCEDURE — 80053 COMPREHEN METABOLIC PANEL: CPT

## 2024-01-23 PROCEDURE — 36591 DRAW BLOOD OFF VENOUS DEVICE: CPT

## 2024-01-23 PROCEDURE — 99215 OFFICE O/P EST HI 40 MIN: CPT | Performed by: INTERNAL MEDICINE

## 2024-01-23 PROCEDURE — 84443 ASSAY THYROID STIM HORMONE: CPT

## 2024-01-23 NOTE — PROGRESS NOTES
Education Record    Learner:  Patient and Spouse    Disease / Diagnosis: head and neck    Barriers / Limitations:  None   Comments:    Method:  Discussion   Comments:    General Topics:  Plan of care reviewed and Fall risk and prevention   Comments:    Outcome:  Shows understanding   Comments:    Pt here for central line lab prior to MD visit. Tolerated well. Discharged in stable condition.

## 2024-01-23 NOTE — PROGRESS NOTES
[facility]  RADIATION ONCOLOGY   FOLLOW UP     Jeannette WellsBharatAndrew  4/20/1970    DIAGNOSIS: SGL SCCA      CANCER HISTORY   -sore throat, dysphagia, hoarseness, right neck and ear pain  -some delay to dx; DL EUA biopsy at [facility] 9/11/23:  centered in epiglottis, +R AEF, abut BOT, neg cords, neg HPX  -PET:  Oct 2023 - SUV 20 at primary site, SUV 3-5 bilateral small cervical LAD     -RT with cisplatin (70 Gy in 35 fx, completed 12/7/2023)    INTERIM HISTORY   -here w/   -off pain meds  -hoarse, but not bad  -taste buds terrible  -mild dysphagia, dry mouth  -has not seen DDS yet  -no neck swelling, doing exercises      EXAM   120#  No oral lesions  Minimal neck edema  No adenopathy  Skin healed  No trismus  Hoarse    IMPRESSION/PLAN   6 weeks out from RT/cisplatin    -PET in March; f/u after  -see DDS  -refer to SLP  -ENT at [facility] in Feb    Liane Case MD  Radiation Oncology  [email or web address]  [phone]    CC: KAYLEEN Louise MD    20 minutes were spent with the patient/family, more than 50 percent on counseling/coordination of care (discuss disease status, management of any side effects, future follow up plans)

## 2024-01-23 NOTE — PATIENT INSTRUCTIONS
- WE WILL CALL TO SCHEDULE YOUR FOLLOW-UP APPOINTMENT WITH DR. BULLOCK AFTER PET SCAN       - CALL [phone] IF YOU HAVE ANY QUESTIONS/CONCERNS REGARDING RADIATION THERAPY

## 2024-01-23 NOTE — PROGRESS NOTES
Patient here for follow-up. States nausea and vomiting improved last week. Appetite and energy levels are improving. Wearing nicotine patch. Still having some constipation. Using senna and colace.  Still having some bone aches and bad taste.

## 2024-01-25 ENCOUNTER — TELEPHONE (OUTPATIENT)
Dept: PHYSICAL THERAPY | Facility: HOSPITAL | Age: 54
End: 2024-01-25

## 2024-01-31 ENCOUNTER — OFFICE VISIT (OUTPATIENT)
Dept: HEMATOLOGY/ONCOLOGY | Facility: HOSPITAL | Age: 54
End: 2024-01-31
Attending: INTERNAL MEDICINE
Payer: MEDICAID

## 2024-01-31 VITALS
RESPIRATION RATE: 16 BRPM | SYSTOLIC BLOOD PRESSURE: 126 MMHG | OXYGEN SATURATION: 100 % | TEMPERATURE: 98 F | DIASTOLIC BLOOD PRESSURE: 82 MMHG | HEART RATE: 103 BPM | BODY MASS INDEX: 23.19 KG/M2 | WEIGHT: 126 LBS | HEIGHT: 61.65 IN

## 2024-01-31 DIAGNOSIS — Z51.5 PALLIATIVE CARE BY SPECIALIST: ICD-10-CM

## 2024-01-31 DIAGNOSIS — C76.0 HEAD AND NECK CANCER (HCC): ICD-10-CM

## 2024-01-31 DIAGNOSIS — T45.1X5A CHEMOTHERAPY-INDUCED NEUROPATHY  (HCC): Primary | ICD-10-CM

## 2024-01-31 DIAGNOSIS — R53.83 FATIGUE DUE TO TREATMENT: ICD-10-CM

## 2024-01-31 DIAGNOSIS — R11.0 NAUSEA: ICD-10-CM

## 2024-01-31 DIAGNOSIS — G62.0 CHEMOTHERAPY-INDUCED NEUROPATHY  (HCC): Primary | ICD-10-CM

## 2024-01-31 PROCEDURE — 99214 OFFICE O/P EST MOD 30 MIN: CPT | Performed by: NURSE PRACTITIONER

## 2024-01-31 RX ORDER — GABAPENTIN 300 MG/1
300 CAPSULE ORAL 2 TIMES DAILY
Qty: 60 CAPSULE | Refills: 1 | Status: SHIPPED | OUTPATIENT
Start: 2024-01-31

## 2024-01-31 NOTE — PROGRESS NOTES
Palliative Care Follow Up Note     Patient Name: Jeannette Mccollum   YOB: 1970   Medical Record Number: [id number]   [facility]: [id number]   Date of visit: 1/31/2024     Chief Complaint/Reason for Visit:  Pain follow up     History of Present Illness:         Jeannette Mccollum is a 53 year old female with larynx cancer who completed chemo/RT.    She feels her symptoms are finally resolving and she has weaned off all opioids.  She now complains of neuropathy in her bilateral feet and legs.  This is affecting endurance with walking and sleep.  She states she's had these symptoms for awhile but feels its more intense now that she is off opioids.  She continues to complain of fatigue but this is slowly improving.  She is still napping for about an hour in the afternoon.  She feels she can eat better now but still only tolerating small meals, otherwise, she has nausea.   She is frustrated with dysgeusia, but she feels this is slowly improving.   She denies constipation now.  She is relieved to be finally feeling a little better.             Problem List:  Patient Active Problem List   Diagnosis    Asthma    Deviated nasal septum    GERD (gastroesophageal reflux disease)    Tobacco abuse    Fibroadenoma    Verruca    Sinusitis    Knee contusion    Esophageal spasm    Iron deficiency anemia due to chronic blood loss    Sinusitis, acute    Subacute pansinusitis    Iron deficiency    Mass of breast, right    Breast tenderness    Chronic bronchitis (HCC)    Persistent cough    Fatigue, unspecified type    Menorrhagia with regular cycle    Anxiety    Epiglottic lesion    Seasonal allergies    Sore throat    Head and neck cancer (HCC)    Malignant neoplasm of overlapping sites of larynx (HCC)    Palliative care by specialist    HTN (hypertension)    Nausea      Medical History:  Past Medical History:   Diagnosis Date    Anxiety 12/27/2017    BLOOD DISORDER     anemia    Esophageal reflux     Extrinsic  asthma, unspecified     Head and neck cancer (HCC) 10/04/2023    High blood pressure     HTN (hypertension) 2023    Hx of motion sickness     Irritable bowel syndrome     Migraines     PONV (postoperative nausea and vomiting)     \"violently ill\" at [facility] after Anesthesia; UI next month didn't have a problem, Anesthesia said was probably from the gas used at [place]    Problems with swallowing     5/3/23 pt states has been limited to soft foods d/t growth in throat; denies difficulty swallowing liquids    Reflux     Ulcer      Surgical History:  Past Surgical History:   Procedure Laterality Date    BENIGN BIOPSY RIGHT  2013    FA    ENDOMETRIAL ABLATION      EXCISION TURBINATE,SUBMUCOUS  2013    Procedure: ADENOIDECTOMY;  Surgeon: Mc Ayala MD;  Location: [facility]    EXCISION TURBINATE,SUBMUCOUS  2013    Procedure: ADENOIDECTOMY;  Surgeon: Mc Ayala MD;  Location: [facility]    HC  SECTION LEVEL I          OTHER SURGICAL HISTORY      CS, esphogel polypectomy x2    OTHER SURGICAL HISTORY      liposuction    REMOVAL ADENOIDS,PRIMARY,12+ Y/O  2013    Procedure: ENDOSCOPIC SUBMUCOUS RESECTION INFERIOR TURBINATES;  Surgeon: Mc Ayala MD;  Location: [facility]    REPAIR OF NASAL SEPTUM  2013    Procedure: SEPTOPLASTY NASAL;  Surgeon: Mc Ayala MD;  Location: [facility]       Allergies:  Allergies   Allergen Reactions    Latex RASH and OTHER (SEE COMMENTS)    Ceftin [Cefuroxime] RASH    Codeine     Oxycodone     Vicodin [Hydrocodone-Acetaminophen] OTHER (SEE COMMENTS)     Per patient allergy was noted 20 years ago, uncofirmed if true allergy     Clindamycin RASH    Penicillins RASH       Palliative Care Social History:    Marital Status:  she is .  Ayad is her support person    Functional History:    ADLs: Independent.  She works part-time as a     Medications:  Current Outpatient  Medications   Medication Sig Dispense Refill    gabapentin 300 MG Oral Cap Take 1 capsule (300 mg total) by mouth in the morning and 1 capsule (300 mg total) before bedtime. 60 capsule 1    benzonatate 100 MG Oral Cap Take 1 capsule (100 mg total) by mouth 3 (three) times daily as needed for cough. 60 capsule 0    ondansetron 8 MG Oral Tablet Dispersible Take 1 tablet (8 mg total) by mouth every 8 (eight) hours as needed for Nausea. (Patient not taking: Reported on 1/23/2024) 30 tablet 1    prochlorperazine (COMPAZINE) 10 mg tablet Take 1 tablet (10 mg total) by mouth every 6 (six) hours as needed for Nausea. 30 tablet 3    HYDROmorphone 4 MG Oral Tab Take 1 tablet (4 mg total) by mouth every 4 (four) hours as needed for Pain. (Patient not taking: Reported on 1/23/2024) 90 tablet 0    cyclobenzaprine 5 MG Oral Tab Take 1 tablet (5 mg total) by mouth 3 (three) times daily as needed for Muscle spasms. (Patient not taking: Reported on 1/23/2024) 60 tablet 1    OLANZapine 5 MG Oral Tab Take 1 tablet (5 mg total) by mouth nightly. (Patient not taking: Reported on 1/23/2024) 30 tablet 0    Scopolamine 1.5mg TD patch 1mg/3days Place 1 patch onto the skin every third day. 10 patch 0    maalox/diphenhydramine/lidocaine Oral Suspension SWISH AND SPIT 5 ML FOUR TIMES DAILY BEFORE A MEAL AND EVERY NIGHT AT BEDTIME (Patient not taking: Reported on 1/23/2024) 500 mL 1    famotidine 40 MG Oral Tab       HYDROmorphone 2 MG Oral Tab Take 1-2 tablets (2-4 mg total) by mouth every 4 (four) hours as needed for Pain. (Patient not taking: Reported on 1/23/2024) 90 tablet 0    polyethylene glycol, PEG 3350, (MIRALAX) 17 GM/SCOOP Oral Powder Take 17 g by mouth 2 (two) times daily as needed. (Patient not taking: Reported on 1/23/2024)      ondansetron (ZOFRAN) 8 MG tablet Take 1 tablet (8 mg total) by mouth every 8 (eight) hours as needed for Nausea. 30 tablet 3    guaiFENesin-codeine 100-10 MG/5ML Oral Solution Take 10 mL by mouth every 6  (six) hours as needed. (Patient not taking: Reported on 11/28/2023)      Mometasone Furoate 0.1 % External Cream Apply twice daily to affected area (Patient not taking: Reported on 1/23/2024) 45 g 1    sennosides 8.6 MG Oral Tab Take 1 tablet (8.6 mg total) by mouth daily. 60 tablet 0    pantoprazole 40 MG Oral Tab EC Take 1 tablet (40 mg total) by mouth 2 (two) times daily.      ALPRAZOLAM 0.5 MG Oral Tab Take 1 tab po 30 minute before radiation daily 30 tablet 0    cyanocobalamin 1000 MCG Oral Tab Take 1 tablet (1,000 mcg total) by mouth daily. (Patient not taking: Reported on 1/23/2024)      buPROPion HCl ER, Smoking Det, 150 MG Oral Tablet 12 Hr Take 1 tablet (150 mg total) by mouth 2 (two) times daily. (Patient not taking: Reported on 1/23/2024)      lisinopril 20 MG Oral Tab Take 1 tablet (20 mg total) by mouth daily.      MONTELUKAST 10 MG Oral Tab TAKE 1 TABLET(10 MG) BY MOUTH EVERY NIGHT 90 tablet 0    albuterol 108 (90 Base) MCG/ACT Inhalation Aero Soln Inhale 2 puffs into the lungs every 4 (four) hours as needed. 54 g 1    Budesonide-Formoterol Fumarate (SYMBICORT) 160-4.5 MCG/ACT Inhalation Aerosol INHALE 2 PUFFS BY MOUTH EVERY MORNING 1 each 5    fluticasone propionate 50 MCG/ACT Nasal Suspension SHAKE LIQUID AND USE 2 SPRAYS IN EACH NOSTRIL DAILY 48 g 1    EPINEPHrine 0.3 MG/0.3ML Injection Solution Auto-injector Inject 0.3 mL (1 each total) as directed one time. Injectf into the muscle one time as needed for anaphylaxis for up to 2 doses (Patient not taking: Reported on 1/23/2024)      Simethicone (GAS-X OR) daily. (Patient not taking: Reported on 11/28/2023)         Review of Systems:  General:  Fatigue.  Feels well.    Respiratory:  Denies SOB, denies cough  Cardiac:  Denies chest pain, heart palpitations  Abdomen:  Denies constipation, diarrhea.  Denies pain.  Nausea today  Psych:  No complaints.  Sleeping well    Palliative Performance Scale:  100 %    Physical Examination:  General: Patient is  alert and oriented, not in acute distress.  Skin:  intact  Respiratory: Normal excursions and effort  Cardiac:   No edema  Abdomen: Soft, non tender   Musculoskeletal: Normal gait.   Psych:  Mood/Affect appropriate    Advanced Directives Discussed and Completed:     HCPOA/Health Surrogate:      There is no completed HCPOA documentation on file in [facility].  Pain was focus today    Palliative Care:   she is now complaining of chemo related neuropathy.  Will trial gabapentin.  She can start with HS dose since its most problematic at night.  If helpful and tolerated, she can add additional AM dose.    She has a PET and onc follow up in March.   She will continue zofran  as needed for nausea    Impression/Plan:   SANNA  Gabapentin 300mg BID  Acetaminophen 1G Q 6 prn    2. Nausea  Zofran 8mg TID prn        Planned Follow up: Return in about 2 months (around 3/31/2024).    Encounter Times  Reviewing/Obtaining:    minutes    Medical Exam:   minutes      Plan: 5  minutes    Notes: 5 minutes      Counseling/Education: 25 minutes    Care Coordination:  minutes      My total time spent caring for the patient on the day of the encounter: 35 minutes.        The 21st Century Cures Act makes medical notes like these available to patients in the interest of transparency. Please be advised this is a medical document. Medical documents are intended to carry relevant information, facts as evident, and the clinical opinion of the practitioner. The medical note is intended as peer to peer communication and may appear blunt or direct. It is written in medical language and may contain abbreviations or verbiage that are unfamiliar.        Electronically Signed by:  RAIN GALAVIZ Outpatient Palliative Nurse Practitioner

## 2024-02-01 ENCOUNTER — HOSPITAL ENCOUNTER (OUTPATIENT)
Dept: GENERAL RADIOLOGY | Facility: HOSPITAL | Age: 54
Discharge: HOME | End: 2024-02-01
Attending: RADIOLOGY
Payer: MEDICAID

## 2024-02-01 DIAGNOSIS — C32.8 MALIGNANT NEOPLASM OF OVERLAPPING SITES OF LARYNX (HCC): ICD-10-CM

## 2024-02-01 PROCEDURE — 92611 MOTION FLUOROSCOPY/SWALLOW: CPT

## 2024-02-01 PROCEDURE — 74230 X-RAY XM SWLNG FUNCJ C+: CPT | Performed by: RADIOLOGY

## 2024-02-01 NOTE — PROGRESS NOTES
ADULT VIDEOFLUOROSCOPIC SWALLOWING STUDY    Admission Date: 2/1/2024  Evaluation Date: 02/01/24  Radiologist: Dr. Decker    RECOMMENDATIONS   Diet Recommendations - Solids: Regular  Diet Recommendations - Liquids: Thin Liquids    Further Follow-up:  Follow Up Needed (Documentation Required): Yes     Compensatory Strategies Recommended: No straws;Slow rate;Small bites and sips;Supraglottic swallow; chin tuck  Aspiration Precautions: Upright position;Slow rate;No straw;Small bites and sips     Treatment Plan/Recommendations:  (Outpatient SLP services)    HISTORY   Background/Objective Information:    Problem List  Active Problems:    * No active hospital problems. *      Past Medical History  Past Medical History:   Diagnosis Date    Anxiety 12/27/2017    BLOOD DISORDER     anemia    Esophageal reflux     Extrinsic asthma, unspecified     Head and neck cancer (HCC) 10/04/2023    High blood pressure     HTN (hypertension) 9/11/2023    Hx of motion sickness     Irritable bowel syndrome     Migraines     PONV (postoperative nausea and vomiting)     \"violently ill\" at [facility] after Anesthesia; UI next month didn't have a problem, Anesthesia said was probably from the gas used at [place]    Problems with swallowing     5/3/23 pt states has been limited to soft foods d/t growth in throat; denies difficulty swallowing liquids    Reflux     Ulcer        Current Diet Consistency: Regular;Thin liquids  Prior Level of Function: Independent  Prior Living Situation: Home alone  History of Recent: No recent respiratory difficulty  Precautions: Aspiration  Imaging results:     Reason for Referral: R/O aspiration; Define pharyngeal phase    Family/Patient Goals:  To eat and drink     ASSESSMENT   DYSPHAGIA ASSESSMENT  Test completed in conjunction with Radiologist.  Patient Positioned: Upright;Midline;Standard Chair.  Patient Viewed: Lateral.   .  Consistencies Presented: Thin liquids;Puree;Hard solid to assess oropharyngeal  swallow function and assess for compensatory strategies to improve safe swallow function.    THIN LIQUIDS  Method of Presentation: Teaspoon;Cup  Oral Phase of Swallow (VFSS - Thin Liquids): Within Functional Limits  Triggered at: Valleculae  Laryngeal Penetration: Before the swallow;During the swallow  Tracheal Aspiration: Before the swallow;During the swallow;SILENT  Cough/Throat Clear Response: No  Cough/Throat Clear Effective: Yes (when cued)  Strategy(ies) Implemented (Thin Liquids): Chin tuck;No straws;Slow rate;Effortful swallow  Effectiveness: Yes        PUREE  Oral Phase of Swallow (VFSS - Puree): Within Functional Limits  Triggered at: Valleculae  Laryngeal Penetration: None  Tracheal Aspiration: None     HARD SOLID  Oral Phase of Swallow (VFSS - Hard Solid): Within Functional Limits  Triggered at: Valleculae  Laryngeal Penetration: None  Tracheal Aspiration: None  Penetration Aspiration Scale Score: Score 8: Material enters the airway, passes below the vocal folds, and no effort is made to eject       Overall Impression:   Videofluoroscopic swallow study was completed in conjunction with the radiologist.  Patient was assessed with thin liquid via spoon and cup, puree and solid consistencies. Patient demonstrated a functional oral phase and moderately impaired pharyngeal phase of swallow with aspiration of thin liquids during the controlled conditions of this exam. No cough or throat clear in response to aspirated material.    Oral phase revealed intact labial closure, cohesive bolus hold, timely and efficient mastication, brisk tongue motion, and complete clearing of the oral cavity.   Pharyngeal phase revealed minimal superior movement of thyroid cartilage, partial anterior hyoid excursion, and impaired laryngeal closure at the height of the swallow.   Impairments of the pharyngeal swallow resulted in deep laryngeal penetration and silent tracheal aspiration thin liquids. Swallow strategies assessed were  chin tuck and modified supraglottic swallow(bolus hold, swallow with effort, cough, swallow again) which were effective in reducing the depth of penetration and eliminated aspiration. Patient at risk of aspiration due to decreased laryngeal integrity and reduced pharyngeal musculature strength.       Following exam, education provided to patient regarding results, aspiration risks, diet recommendations, aspiration precautions and plan with understanding verbalized. Provided patient with compensatory strategies which patient able to return demonstration. Patient encouraged to utilize a cough on every swallow.    Reviewed the Pillars of Aspiration PNA and keys to reduce risk of developing aspiration PNA with patient and understanding verbalized.              GOALS  Goal #1 Patient will perform oral and pharyngeal phase musculature exercises consisting of:  Effortful Swallow  Supraglottic Swallow  Mendelsohn Maneuver  Goal established   Goal #2 Further goals to be delineated by OP therapist targeting reducing risk of dysphagia related disease and increasing tolerance of least restrictive diet for nutrition/hydration.     Goal established.      PLAN: Patient would benefit from OP therapy targeting oral and pharyngeal musculature exercises to help strengthen and coordinate the muscles involved in swallowing.       EDUCATION/INSTRUCTION  Reviewed results and recommendations with patient.  Agreement/Understanding verbalized and all questions answered to her apparent satisfaction.    INTERDISCIPLINARY COMMUNICATION  Reviewed results with Radiologist; agreement verbalized.    Patient Experiencing Pain: No      FOLLOW UP  Treatment Plan/Recommendations:  (Outpatient SLP services)         Thank you for your referral.   If you have any questions, please contact DIONE Varghese

## 2024-02-02 ENCOUNTER — TELEPHONE (OUTPATIENT)
Dept: HEMATOLOGY/ONCOLOGY | Facility: HOSPITAL | Age: 54
End: 2024-02-02

## 2024-02-02 NOTE — TELEPHONE ENCOUNTER
Patient called and has really bad dry mouth.  It is getting worse.  She has had it for awhile.  Please call.  Thank you.

## 2024-02-02 NOTE — TELEPHONE ENCOUNTER
Toxicities: C1 D43 Cisplatin on 11/28/2023  Completed RT on 11/30/2023    Dry Mouth    Jeannette reports that when she had her video swallow yesterday she told the therapist she is having terrible dry mouth. Her insurance won't pay for OTC mouth moisturizer. The therapist told her to call Dr Louise or Dr Case to see if they can send  prescription to her pharmacy.     I told Jeannette I would forward her request to Dr Louise and Dr Case.

## 2024-02-05 ENCOUNTER — PATIENT MESSAGE (OUTPATIENT)
Dept: HEMATOLOGY/ONCOLOGY | Age: 54
End: 2024-02-05

## 2024-02-05 RX ORDER — CEVIMELINE HYDROCHLORIDE 30 MG/1
30 CAPSULE ORAL 3 TIMES DAILY
Qty: 90 CAPSULE | Refills: 11 | Status: SHIPPED | OUTPATIENT
Start: 2024-02-05 | End: 2025-01-30

## 2024-02-07 ENCOUNTER — SOCIAL WORK SERVICES (OUTPATIENT)
Dept: HEMATOLOGY/ONCOLOGY | Facility: HOSPITAL | Age: 54
End: 2024-02-07

## 2024-02-07 NOTE — PROGRESS NOTES
completed letter and sent to patient via [employer].     Letter:   Jeannette Mccollum ( 1970) is a patient under my care at [facility] for her diagnosis of Head and Neck Cancer- Squamous Cell Carcinoma of Larynx. She is has undergone Chemotherapy and Radiation treatments, resulting in pain, weakness, fatigue, nausea, loss of endurance, dehydration, skin irritation, loss of voice, neuropathy, and compromised immune system. She is unable to work due to her disease and treatment, causing her financial burden.

## 2024-02-08 ENCOUNTER — APPOINTMENT (OUTPATIENT)
Dept: SPEECH THERAPY | Age: 54
End: 2024-02-08
Attending: RADIOLOGY
Payer: MEDICAID

## 2024-02-08 ENCOUNTER — TELEPHONE (OUTPATIENT)
Dept: SPEECH THERAPY | Age: 54
End: 2024-02-08
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
## 2024-02-08 DIAGNOSIS — K11.7 XEROSTOMIA DUE TO RADIOTHERAPY: Primary | ICD-10-CM

## 2024-02-08 RX ORDER — PILOCARPINE HYDROCHLORIDE 5 MG/1
TABLET, FILM COATED ORAL
Qty: 90 TABLET | Refills: 0 | Status: SHIPPED | OUTPATIENT
Start: 2024-02-08

## 2024-02-08 NOTE — TELEPHONE ENCOUNTER
I called Jeannette and updated her that Dr Case said salivary substitutes are not covered by insurance. He recommends she continue to use alcohol free mouth rinse. She may also chew sugar free gum, limit caffeine and continue to sip on water.    Jeannette verbalized understanding. No other questions or concerns at this time.

## 2024-02-19 RX ORDER — GABAPENTIN 300 MG/1
300 CAPSULE ORAL 3 TIMES DAILY
Qty: 270 CAPSULE | Refills: 0 | Status: SHIPPED | OUTPATIENT
Start: 2024-02-19

## 2024-03-01 ENCOUNTER — HOSPITAL ENCOUNTER (OUTPATIENT)
Dept: NUCLEAR MEDICINE | Facility: HOSPITAL | Age: 54
Discharge: HOME | End: 2024-03-01
Attending: INTERNAL MEDICINE
Payer: MEDICAID

## 2024-03-01 DIAGNOSIS — C76.0 HEAD AND NECK CANCER (HCC): ICD-10-CM

## 2024-03-01 LAB — GLUCOSE BLD-MCNC: 117 MG/DL (ref 70–99)

## 2024-03-01 PROCEDURE — 78815 PET IMAGE W/CT SKULL-THIGH: CPT | Performed by: INTERNAL MEDICINE

## 2024-03-01 PROCEDURE — 82962 GLUCOSE BLOOD TEST: CPT

## 2024-03-05 ENCOUNTER — HOSPITAL ENCOUNTER (OUTPATIENT)
Dept: RADIATION ONCOLOGY | Facility: HOSPITAL | Age: 54
Discharge: HOME | End: 2024-03-05
Attending: RADIOLOGY
Payer: MEDICAID

## 2024-03-05 VITALS
WEIGHT: 128 LBS | BODY MASS INDEX: 24 KG/M2 | RESPIRATION RATE: 18 BRPM | DIASTOLIC BLOOD PRESSURE: 74 MMHG | SYSTOLIC BLOOD PRESSURE: 106 MMHG | TEMPERATURE: 99 F | HEART RATE: 102 BPM | OXYGEN SATURATION: 97 %

## 2024-03-05 DIAGNOSIS — C32.8 MALIGNANT NEOPLASM OF OVERLAPPING SITES OF LARYNX (HCC): Primary | ICD-10-CM

## 2024-03-05 PROCEDURE — 99213 OFFICE O/P EST LOW 20 MIN: CPT

## 2024-03-05 RX ORDER — CEVIMELINE HYDROCHLORIDE 30 MG/1
30 CAPSULE ORAL 3 TIMES DAILY
COMMUNITY

## 2024-03-05 NOTE — PATIENT INSTRUCTIONS
- WE WILL CALL TO SCHEDULE YOUR FOLLOW-UP APPOINTMENT WITH DR. BULLOCK IN 6 MONTHS AFTER CT CHEST    - CALL CENTRAL SCHEDULING AT [phone] TO SCHEDULE CT CHEST IN 6 MONTHS    - CALL [phone] IF YOU HAVE ANY QUESTIONS/CONCERNS REGARDING RADIATION THERAPY

## 2024-03-05 NOTE — PROGRESS NOTES
[facility]  RADIATION ONCOLOGY   FOLLOW UP     Jeannette Mccollum  4/20/1970    DIAGNOSIS: SGL SCCA      CANCER HISTORY   -sore throat, dysphagia, hoarseness, right neck and ear pain  -some delay to dx; DL EUA biopsy at [facility] 9/11/23:  centered in epiglottis, +R AEF, abut BOT, neg cords, neg HPX  -PET:  Oct 2023 - SUV 20 at primary site, SUV 3-5 bilateral small cervical LAD  -RT with cisplatin (70 Gy in 35 fx, completed 12/7/2023)    INTERIM HISTORY   -here w/   -saw ENT [facility] Feb - LUCERO, just edema, defect in AEF/epiglottis, Rx edema  -massaging edema in neck, mild  -doing neck exercises  -saw DDS  -no pain meds  -gaining weight back    -video swallow 2/1/24: silent aspiration    -PET 3/1/24:  no areas of increased uptake. Cold 7 mm LLL lung nodule. Probably was present on outside CT chest from Sep 2023 under Jeannette Morales.    EXAM   128#  Minimal neck edema  No adenopathy  Skin healed  No trismus  Hoarse    IMPRESSION/PLAN   3 mo out from RT/cisplatin    -PET with CR; exam LUCERO  -has defect in AEF/epiglottis; to see SLP tomorrow; discussed aspiration pneumonia risk    -discussed ENT/DDS/SLP f/u  -will check CT chest in Sep to confirm stability of LLL nodule    Liane Case MD  Radiation Oncology  [email or web address]  [phone]    CC: MD MICHELLE Stahl MD    20 minutes were spent with the patient/family, more than 50 percent on counseling/coordination of care (discuss disease status, management of any side effects, future follow up plans)

## 2024-03-05 NOTE — PROGRESS NOTES
Nursing Follow-Up Note    Patient: Jeannette Mccollum  YOB: 1970  Age: 53 year old  Radiation Oncologist: Dr. Liane Case  Referring Physician: No ref. provider found  Chief Complaint:   Chief Complaint   Patient presents with    Follow - Up     Date: 3/5/2024    Toxicities: n/a    Vital Signs: /74 (BP Location: Right arm, Patient Position: Sitting, Cuff Size: adult)   Pulse 102   Temp 98.9 °F (37.2 °C) (Tympanic)   Resp 18   Wt 58.1 kg (128 lb)   SpO2 97%   BMI 23.68 kg/m² ,   Wt Readings from Last 6 Encounters:   03/05/24 58.1 kg (128 lb)   01/31/24 57.2 kg (126 lb)   01/23/24 54.7 kg (120 lb 9.6 oz)   01/03/24 55.3 kg (122 lb)   11/30/23 61.2 kg (135 lb)   11/28/23 59.4 kg (131 lb)       Allergies:    Allergies   Allergen Reactions    Latex RASH and OTHER (SEE COMMENTS)    Ceftin [Cefuroxime] RASH    Codeine     Oxycodone     Vicodin [Hydrocodone-Acetaminophen] OTHER (SEE COMMENTS)     Per patient allergy was noted 20 years ago, uncofirmed if true allergy     Clindamycin RASH    Penicillins RASH       Nursing Note: Hx of SGL SCCA. Completed chemoRT 12/7/23. Here for follow up today. Had PET 3/1/24. Video swallowing study done 2/1/24. Starting speech therapy on 3/14/24. Pt still has neck stiffness. Doing exercises and massage. Wakes up with neck swelling some mornings. C/o headaches over the last month. Unsure if it's related to weather. Takes tylenol for headache and takes edge off. States she's still needing to nap daily. Has severe dry mouth and dry nasal mucous membranes. Using cevimeline with some relief. Has little bit of taste. Has some diarrhea and urinary urgency. Doing 1 protein shake daily. Saw ENT Dr. Floyd on 2/20/24- scope done.

## 2024-03-12 NOTE — PROGRESS NOTES
[facility] Note    Diagnosis: cT2 cN2 cM0 Laryngeal SCCa, p16+    Treatment History:   ChemoRT with weekly Cisplatin: 10/31/23-12/7/23    Visit Diagnosis:  1. Head and neck cancer (HCC)        History of Present Illness: 53F was referred by Dr. Floyd for [id number] squamous cell of the larynx.     -She has had 1 year of throat pain, R>L. This was associated with otalgia. She lost 10-15 lb. She has direct laryngoscopy x 2 that was negative. However, she noted more dysphagia and weight loss. Imaging from 9/13/23 was concerning for a suprglottic mass not involving the  pre-epiglottic or paraglottic space. No neck nodes. CT chest negative. Repeat ENT evaluation was consistent with Squamous cell carcinoma. She had a PET/CT done on 10/3/23 at [facility]-Noted to have bilateral cervical LN involvement. She has a 40 pack year smoking history and still smokes 1 PPD.     -Her case was discussed at [facility] and primary surgery vs. Primary RT were discussed for cT2 cN0 cM0 disease. She opted for primary RT. However this discussion was done before her PET results.    -On 10/3/23, I met her for the first time. We discussed her PET/CT results from earlier today showing bilateral cervical Frederic disease. We discussed that primary RT alone would not be recommended. We discussed that we should consider surgery, induction chemotherapy or chemoRT. She states that she is adamantly against chemotherapy. She states that the pain is the worse part. No current dysphagia. She eventually was agreeable to chemoRT.     -ChemoRT with weekly Cisplatin: 10/31/23-12/7.23    -PET/CT on 3/1/24: LUCERO in head/neck. 7 mm LLL lung nodule without uptake.     Interval History:1/23/24  -PET/CT reviewed   -ENT visit reviewed   -Doing well overall. Still has dry mouth. No dysphagia or neck pain    Review of Systems: 12 Point ROS was completed and pertinent positives are in the HPI    Current Outpatient Medications on File Prior to Visit   Medication  Sig Dispense Refill    Cevimeline HCl 30 MG Oral Cap Take 1 capsule (30 mg total) by mouth 3 (three) times daily.      gabapentin 300 MG Oral Cap Take 1 capsule (300 mg total) by mouth in the morning, at noon, and at bedtime. 270 capsule 0    ALPRAZOLAM 0.5 MG Oral Tab Take 1 tab po 30 minute before radiation daily 30 tablet 0    lisinopril 20 MG Oral Tab Take 1 tablet (20 mg total) by mouth daily.      albuterol 108 (90 Base) MCG/ACT Inhalation Aero Soln Inhale 2 puffs into the lungs every 4 (four) hours as needed. 54 g 1    Budesonide-Formoterol Fumarate (SYMBICORT) 160-4.5 MCG/ACT Inhalation Aerosol INHALE 2 PUFFS BY MOUTH EVERY MORNING 1 each 5    Simethicone (GAS-X OR) daily.      PILOCARPINE 5 MG Oral Tab 1 tab po tid for 7 days, then 2 tab po tid (Patient not taking: Reported on 3/5/2024) 90 tablet 0    prochlorperazine (COMPAZINE) 10 mg tablet Take 1 tablet (10 mg total) by mouth every 6 (six) hours as needed for Nausea. (Patient not taking: Reported on 3/5/2024) 30 tablet 3    maalox/diphenhydramine/lidocaine Oral Suspension SWISH AND SPIT 5 ML FOUR TIMES DAILY BEFORE A MEAL AND EVERY NIGHT AT BEDTIME (Patient not taking: Reported on 1/23/2024) 500 mL 1    famotidine 40 MG Oral Tab  (Patient not taking: Reported on 3/13/2024)      polyethylene glycol, PEG 3350, (MIRALAX) 17 GM/SCOOP Oral Powder Take 17 g by mouth 2 (two) times daily as needed. (Patient not taking: Reported on 1/23/2024)      ondansetron (ZOFRAN) 8 MG tablet Take 1 tablet (8 mg total) by mouth every 8 (eight) hours as needed for Nausea. (Patient not taking: Reported on 3/5/2024) 30 tablet 3    guaiFENesin-codeine 100-10 MG/5ML Oral Solution Take 10 mL by mouth every 6 (six) hours as needed. (Patient not taking: Reported on 11/28/2023)      Mometasone Furoate 0.1 % External Cream Apply twice daily to affected area (Patient not taking: Reported on 1/23/2024) 45 g 1    sennosides 8.6 MG Oral Tab Take 1 tablet (8.6 mg total) by mouth daily.  (Patient not taking: Reported on 3/5/2024) 60 tablet 0    pantoprazole 40 MG Oral Tab EC Take 1 tablet (40 mg total) by mouth 2 (two) times daily. (Patient not taking: Reported on 3/5/2024)      cyanocobalamin 1000 MCG Oral Tab Take 1 tablet (1,000 mcg total) by mouth daily. (Patient not taking: Reported on 1/23/2024)      MONTELUKAST 10 MG Oral Tab TAKE 1 TABLET(10 MG) BY MOUTH EVERY NIGHT (Patient not taking: Reported on 3/13/2024) 90 tablet 0    fluticasone propionate 50 MCG/ACT Nasal Suspension SHAKE LIQUID AND USE 2 SPRAYS IN EACH NOSTRIL DAILY (Patient not taking: Reported on 3/13/2024) 48 g 1    EPINEPHrine 0.3 MG/0.3ML Injection Solution Auto-injector Inject 0.3 mL (1 each total) as directed one time. Injectf into the muscle one time as needed for anaphylaxis for up to 2 doses (Patient not taking: Reported on 1/23/2024)       Current Facility-Administered Medications on File Prior to Visit   Medication Dose Route Frequency Provider Last Rate Last Admin    [COMPLETED] heparin (Porcine) 100 Units/mL lock flush 500 Units  5 mL Intracatheter Once Marielle Louise MD   500 Units at 03/01/24 0925     Past Medical History:   Diagnosis Date    Anxiety 12/27/2017    BLOOD DISORDER     anemia    Esophageal reflux     Extrinsic asthma, unspecified     Head and neck cancer (HCC) 10/04/2023    High blood pressure     HTN (hypertension) 9/11/2023    Hx of motion sickness     Irritable bowel syndrome     Migraines     PONV (postoperative nausea and vomiting)     \"violently ill\" at [facility] after Anesthesia; UI next month didn't have a problem, Anesthesia said was probably from the gas used at [place]    Problems with swallowing     5/3/23 pt states has been limited to soft foods d/t growth in throat; denies difficulty swallowing liquids    Reflux     Ulcer      Past Surgical History:   Procedure Laterality Date    BENIGN BIOPSY RIGHT  05/22/2013    FA    ENDOMETRIAL ABLATION      EXCISION TURBINATE,SUBMUCOUS   2013    Procedure: ADENOIDECTOMY;  Surgeon: Mc Ayala MD;  Location: [facility]    EXCISION TURBINATE,SUBMUCOUS  2013    Procedure: ADENOIDECTOMY;  Surgeon: Mc Ayala MD;  Location: [facility]    HC  SECTION LEVEL I      1990    OTHER SURGICAL HISTORY      CS, esphogel polypectomy x2    OTHER SURGICAL HISTORY      liposuction    REMOVAL ADENOIDS,PRIMARY,12+ Y/O  2013    Procedure: ENDOSCOPIC SUBMUCOUS RESECTION INFERIOR TURBINATES;  Surgeon: Mc Ayala MD;  Location: [facility]    REPAIR OF NASAL SEPTUM  2013    Procedure: SEPTOPLASTY NASAL;  Surgeon: Mc Ayala MD;  Location: [facility]     Social History     Socioeconomic History    Marital status:     Number of children: 1   Occupational History    Occupation: [employer]     Comment: working 3 days per week   Tobacco Use    Smoking status: Every Day     Packs/day: 0.50     Years: 30.00     Additional pack years: 0.00     Total pack years: 15.00     Types: Cigarettes    Smokeless tobacco: Former     Types: Chew     Quit date: 2008   Vaping Use    Vaping Use: Never used   Substance and Sexual Activity    Alcohol use: Not Currently     Alcohol/week: 5.0 standard drinks of alcohol     Types: 6 Standard drinks or equivalent per week     Comment: 10/06/2023 - Patient no longer consumes alcohol    Drug use: No      Family History   Problem Relation Age of Onset    Cancer Father         esophageal/lung    Breast Cancer Paternal Grandmother 60        age 60's       Physical Exam  Height: 156.6 cm (5' 1.65\") (1052)  Weight: 54.9 kg (121 lb) (1052)  BSA (Calculated - sq m): 1.54 sq meters (1052)  Pulse: 111 (1052)  BP: 128/82 (1052)  Temp: 97.7 °F (36.5 °C) (1052)  Do Not Use - Resp Rate: --  SpO2: 99 % (1052)    General: NAD, AOX3  HEENT: clear op, mmm, no jvd, no scleral icterus  LN: Neck LAD has resolved   CV: RRR S1S2 no  murmurs  Extremities: No edema   Lungs: bilateral wheezing, no increased work of breathing  Abd: soft nt nd +BS no hepatosplenomegaly  Neuro: CN: II-XII grossly intact      Results:  Lab Results   Component Value Date    WBC 3.9 (L) 01/23/2024    HGB 11.2 (L) 01/23/2024    HCT 32.8 (L) 01/23/2024    MCV 95.3 01/23/2024    .0 01/23/2024     Lab Results   Component Value Date     01/23/2024    K 3.8 01/23/2024    CO2 24.0 01/23/2024     01/23/2024    BUN 16 01/23/2024    GLUCOSE 91 10/17/2014    ALB 3.5 01/23/2024       No results found for: \"LDH\"    Radiology: reviewed   PET/CT   1. Large markedly hypermetabolic mass centered at the right epiglottis with involvement of the right aryepiglottic fold and piriform sinus, consistent with the known malignancy.   2. Bilateral FDG-avid cervical lymph nodes, as detailed above, suspicious for metastatic involvement.   3. There is an FDG avid node positioned anterior to the right-sided , corresponding to a small enhancing focus seen on the previous CT, is of uncertain clinical significance. Attention on follow-up imaging.   4. No FDG PET evidence of distant metastases.     Addendum    Tumor cells are positive for p16 (>75% of tumor cells)  Original diagnosis remains unchanged       Pathology: reviewed   A. Larynx, Supraglottic Mass; Biopsy:  Superficial fragments of squamous cell carcinoma with necrosis     B. Larynx, Supraglottic Mass; Larynx, Biopsy:  Fragments of moderately to poorly differentiated invasive squamous cell carcinoma  See comment    Assessment and Plan:  cT2 cN2 cM0 Laryngeal SCCa, p16+  -we reviewed NCCN guidelines and discussed primary surgery, chemoRT or induction chemotherapy. She initially stated that she is adamantly against chemotherapy and does not want chemotherapy. She eventually agreed with chemoRT with weekly Cisplatin 40 mg/m2 which finished on 12/7/23  -PET/CT on 3/1/24 with LUCERO. No further imaging unless new  symptoms  -She has cut back on smoking. Wellbutrin not covered by insurance  -PRN IVF  -Following with ENT for serial exams, Dr. Floyd at [facility]    LLL Nodule: not pet avid-repeat CT chest ordered      Cancer related pain: follow up with palliative     Nausea: Medical marijuana     MDM: high: head and neck cancer    RTC in 6 months     KAYLEEN Louise MD  [facility]

## 2024-03-13 ENCOUNTER — OFFICE VISIT (OUTPATIENT)
Dept: HEMATOLOGY/ONCOLOGY | Facility: HOSPITAL | Age: 54
End: 2024-03-13
Attending: INTERNAL MEDICINE
Payer: MEDICAID

## 2024-03-13 VITALS
WEIGHT: 121 LBS | BODY MASS INDEX: 22.26 KG/M2 | TEMPERATURE: 98 F | HEIGHT: 61.65 IN | DIASTOLIC BLOOD PRESSURE: 82 MMHG | SYSTOLIC BLOOD PRESSURE: 128 MMHG | OXYGEN SATURATION: 99 % | RESPIRATION RATE: 16 BRPM | HEART RATE: 111 BPM

## 2024-03-13 DIAGNOSIS — T45.1X5A CHEMOTHERAPY-INDUCED NEUROPATHY (HCC): Primary | ICD-10-CM

## 2024-03-13 DIAGNOSIS — C76.0 HEAD AND NECK CANCER (HCC): Primary | ICD-10-CM

## 2024-03-13 DIAGNOSIS — G62.0 CHEMOTHERAPY-INDUCED NEUROPATHY (HCC): Primary | ICD-10-CM

## 2024-03-13 DIAGNOSIS — Z51.5 PALLIATIVE CARE BY SPECIALIST: ICD-10-CM

## 2024-03-13 DIAGNOSIS — C76.0 HEAD AND NECK CANCER (HCC): ICD-10-CM

## 2024-03-13 PROCEDURE — 99214 OFFICE O/P EST MOD 30 MIN: CPT | Performed by: INTERNAL MEDICINE

## 2024-03-13 PROCEDURE — 99213 OFFICE O/P EST LOW 20 MIN: CPT | Performed by: NURSE PRACTITIONER

## 2024-03-13 NOTE — PROGRESS NOTES
Palliative Care Follow Up Note     Patient Name: Jeannette Mccollum   YOB: 1970   Medical Record Number: [id number]   CSN: [id number]   Date of visit: 3/13/2024     Chief Complaint/Reason for Visit:  Pain follow up     History of Present Illness:         Jeannette Mccollum is a 53 year old female with larynx cancer who completed chemo/RT.    She feels her symptoms are finally resolving except for her dry mouth.  She continues to complain of neuropathic pain in bilateral feet affecting sleep at night and endurance.  She also is having difficulty with fine motor activitie, especially buttons.  She will drop cups on occasion.  This is better with gabapentin but she feels it could be better.  She is frustrated with dysgeusia, but she feels this is slowly improving.   She is relieved to be finally feeling a little better and is hopeful to return to work as a  soon.             Problem List:  Patient Active Problem List   Diagnosis    Asthma (HCC)    Deviated nasal septum    GERD (gastroesophageal reflux disease)    Tobacco abuse    Fibroadenoma    Verruca    Sinusitis    Knee contusion    Esophageal spasm    Iron deficiency anemia due to chronic blood loss    Sinusitis, acute    Subacute pansinusitis    Iron deficiency    Mass of breast, right    Breast tenderness    Chronic bronchitis (HCC)    Persistent cough    Fatigue, unspecified type    Menorrhagia with regular cycle    Anxiety    Epiglottic lesion    Seasonal allergies    Sore throat    Head and neck cancer (HCC)    Malignant neoplasm of overlapping sites of larynx (HCC)    Palliative care by specialist    HTN (hypertension)    Nausea      Medical History:  Past Medical History:   Diagnosis Date    Anxiety 12/27/2017    BLOOD DISORDER     anemia    Esophageal reflux     Extrinsic asthma, unspecified     Head and neck cancer (HCC) 10/04/2023    High blood pressure     HTN (hypertension) 9/11/2023    Hx of motion sickness      Irritable bowel syndrome     Migraines     PONV (postoperative nausea and vomiting)     \"violently ill\" at [facility] after Anesthesia; UI next month didn't have a problem, Anesthesia said was probably from the gas used at [place]    Problems with swallowing     5/3/23 pt states has been limited to soft foods d/t growth in throat; denies difficulty swallowing liquids    Reflux     Ulcer      Surgical History:  Past Surgical History:   Procedure Laterality Date    BENIGN BIOPSY RIGHT  2013    FA    ENDOMETRIAL ABLATION      EXCISION TURBINATE,SUBMUCOUS  2013    Procedure: ADENOIDECTOMY;  Surgeon: Mc Ayala MD;  Location: [facility]    EXCISION TURBINATE,SUBMUCOUS  2013    Procedure: ADENOIDECTOMY;  Surgeon: Mc Ayala MD;  Location: [facility]    HC  SECTION LEVEL I      1990    OTHER SURGICAL HISTORY      CS, esphogel polypectomy x2    OTHER SURGICAL HISTORY      liposuction    REMOVAL ADENOIDS,PRIMARY,12+ Y/O  2013    Procedure: ENDOSCOPIC SUBMUCOUS RESECTION INFERIOR TURBINATES;  Surgeon: Mc Ayala MD;  Location: [facility]    REPAIR OF NASAL SEPTUM  2013    Procedure: SEPTOPLASTY NASAL;  Surgeon: Mc Ayala MD;  Location: [facility]       Allergies:  Allergies   Allergen Reactions    Latex RASH and OTHER (SEE COMMENTS)    Ceftin [Cefuroxime] RASH    Codeine     Oxycodone     Vicodin [Hydrocodone-Acetaminophen] OTHER (SEE COMMENTS)     Per patient allergy was noted 20 years ago, uncofirmed if true allergy     Clindamycin RASH    Penicillins RASH       Palliative Care Social History:    Marital Status:  she is .  Ayad is her support person    Functional History:    ADLs: Independent.  She works part-time as a     Medications:  Current Outpatient Medications   Medication Sig Dispense Refill    Cevimeline HCl 30 MG Oral Cap Take 1 capsule (30 mg total) by mouth 3 (three) times daily.       gabapentin 300 MG Oral Cap Take 1 capsule (300 mg total) by mouth in the morning, at noon, and at bedtime. (Patient taking differently: Take 2 capsules (600 mg total) by mouth in the morning, at noon, and at bedtime.) 270 capsule 0    PILOCARPINE 5 MG Oral Tab 1 tab po tid for 7 days, then 2 tab po tid (Patient not taking: Reported on 3/5/2024) 90 tablet 0    prochlorperazine (COMPAZINE) 10 mg tablet Take 1 tablet (10 mg total) by mouth every 6 (six) hours as needed for Nausea. (Patient not taking: Reported on 3/5/2024) 30 tablet 3    famotidine 40 MG Oral Tab  (Patient not taking: Reported on 3/13/2024)      ondansetron (ZOFRAN) 8 MG tablet Take 1 tablet (8 mg total) by mouth every 8 (eight) hours as needed for Nausea. (Patient not taking: Reported on 3/5/2024) 30 tablet 3    guaiFENesin-codeine 100-10 MG/5ML Oral Solution Take 10 mL by mouth every 6 (six) hours as needed. (Patient not taking: Reported on 11/28/2023)      Mometasone Furoate 0.1 % External Cream Apply twice daily to affected area (Patient not taking: Reported on 1/23/2024) 45 g 1    pantoprazole 40 MG Oral Tab EC Take 1 tablet (40 mg total) by mouth 2 (two) times daily. (Patient not taking: Reported on 3/5/2024)      ALPRAZOLAM 0.5 MG Oral Tab Take 1 tab po 30 minute before radiation daily 30 tablet 0    cyanocobalamin 1000 MCG Oral Tab Take 1 tablet (1,000 mcg total) by mouth daily. (Patient not taking: Reported on 1/23/2024)      lisinopril 20 MG Oral Tab Take 1 tablet (20 mg total) by mouth daily.      MONTELUKAST 10 MG Oral Tab TAKE 1 TABLET(10 MG) BY MOUTH EVERY NIGHT (Patient not taking: Reported on 3/13/2024) 90 tablet 0    albuterol 108 (90 Base) MCG/ACT Inhalation Aero Soln Inhale 2 puffs into the lungs every 4 (four) hours as needed. 54 g 1    Budesonide-Formoterol Fumarate (SYMBICORT) 160-4.5 MCG/ACT Inhalation Aerosol INHALE 2 PUFFS BY MOUTH EVERY MORNING 1 each 5    fluticasone propionate 50 MCG/ACT Nasal Suspension SHAKE LIQUID AND USE  2 SPRAYS IN EACH NOSTRIL DAILY (Patient not taking: Reported on 3/13/2024) 48 g 1    EPINEPHrine 0.3 MG/0.3ML Injection Solution Auto-injector Inject 0.3 mL (1 each total) as directed one time. Injectf into the muscle one time as needed for anaphylaxis for up to 2 doses (Patient not taking: Reported on 1/23/2024)      Simethicone (GAS-X OR) daily.         Review of Systems:  General:  Fatigue.  Feels well.    Respiratory:  Denies SOB, denies cough  Cardiac:  Denies chest pain, heart palpitations  Abdomen:  Denies constipation, diarrhea.  Denies pain.  Nausea today  Psych:  No complaints.  Sleeping well    Palliative Performance Scale:  100 %    Physical Examination:  General: Patient is alert and oriented, not in acute distress.  Skin:  intact  Respiratory: Normal excursions and effort  Cardiac:   No edema  Abdomen: Soft, non tender   Musculoskeletal: Normal gait.   Psych:  Mood/Affect appropriate    Advanced Directives Discussed and Completed:     HCPOA/Health Surrogate:      There is no completed HCPOA documentation on file in [facility].  Pain was focus today    Palliative Care:   SANNA is still affecting ADLs and QOL.  Will increase gabapentin to better improve symptoms.  Once her symptoms are stable on dosing, she can transition maintenance of gabapentin to PCP.        Impression/Plan:   SANNA  Gabapentin 600mg TID  Acetaminophen 1G Q 6 prn      Planned Follow up: Return in about 3 months (around 6/13/2024).    Encounter Times  Reviewing/Obtaining:    minutes    Medical Exam:   minutes      Plan: 5  minutes    Notes: 5 minutes      Counseling/Education: 15 minutes    Care Coordination:  minutes      My total time spent caring for the patient on the day of the encounter: 25 minutes.        The 21st Century Cures Act makes medical notes like these available to patients in the interest of transparency. Please be advised this is a medical document. Medical documents are intended to carry relevant information, facts as  evident, and the clinical opinion of the practitioner. The medical note is intended as peer to peer communication and may appear blunt or direct. It is written in medical language and may contain abbreviations or verbiage that are unfamiliar.        Electronically Signed by:  RAIN GALAVIZ Outpatient Palliative Nurse Practitioner

## 2024-03-13 NOTE — PROGRESS NOTES
Patient here for follow-up. Had recent PET on 3/1/24. Still having neuropathy despite taking gabapentin. Energy levels are still poor. Still having diarrhea. Will try BRAT diet.

## 2024-03-14 ENCOUNTER — OFFICE VISIT (OUTPATIENT)
Dept: SPEECH THERAPY | Age: 54
End: 2024-03-14
Attending: RADIOLOGY
Payer: MEDICAID

## 2024-03-14 DIAGNOSIS — C32.8 MALIGNANT NEOPLASM OF OVERLAPPING SITES OF LARYNX (HCC): Primary | ICD-10-CM

## 2024-03-14 PROCEDURE — 92526 ORAL FUNCTION THERAPY: CPT

## 2024-03-14 PROCEDURE — 92610 EVALUATE SWALLOWING FUNCTION: CPT

## 2024-03-14 NOTE — PROGRESS NOTES
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 Radiation ended in December and she just started feeling better the last few weeks. She went for a swallow study on February 1 which revealed silent aspiration with thin liquids which was prevented with chin tuck, slow rate, and effortful swallow. They recommended regular diet to thin liquid using aspiration precautions.     Current functional limitations include difficulty eating and drinking post radiation treatment.  Pain: in neck reported; MD is aware and she is doing exercises to help    Recent Medical History:   Past Medical History:   Diagnosis Date    Anxiety 12/27/2017    BLOOD DISORDER     anemia    Esophageal reflux     Extrinsic asthma, unspecified     Head and neck cancer (HCC) 10/04/2023    High blood pressure     HTN (hypertension) 9/11/2023    Hx of motion sickness     Irritable bowel syndrome     Migraines     PONV (postoperative nausea and vomiting)     \"violently ill\" at [facility] after Anesthesia; UI next month didn't have a problem, Anesthesia said was probably from the gas used at [place]    Problems with swallowing     5/3/23 pt states has been limited to soft foods d/t growth in throat; denies difficulty swallowing liquids    Reflux     Ulcer        Current Medications:   Current Outpatient Medications on File Prior to Visit   Medication Sig Dispense Refill    Cevimeline HCl 30 MG Oral Cap Take 1 capsule (30 mg total) by mouth 3 (three) times daily.      gabapentin 300 MG Oral Cap Take 1 capsule (300 mg total) by mouth in the morning, at noon, and at  bedtime. (Patient taking differently: Take 2 capsules (600 mg total) by mouth in the morning, at noon, and at bedtime.) 270 capsule 0    PILOCARPINE 5 MG Oral Tab 1 tab po tid for 7 days, then 2 tab po tid (Patient not taking: Reported on 3/5/2024) 90 tablet 0    prochlorperazine (COMPAZINE) 10 mg tablet Take 1 tablet (10 mg total) by mouth every 6 (six) hours as needed for Nausea. (Patient not taking: Reported on 3/5/2024) 30 tablet 3    [] nicotine 14 MG/24HR Transdermal Patch 24 Hr Place 1 patch onto the skin daily.      famotidine 40 MG Oral Tab  (Patient not taking: Reported on 3/13/2024)      ondansetron (ZOFRAN) 8 MG tablet Take 1 tablet (8 mg total) by mouth every 8 (eight) hours as needed for Nausea. (Patient not taking: Reported on 3/5/2024) 30 tablet 3    guaiFENesin-codeine 100-10 MG/5ML Oral Solution Take 10 mL by mouth every 6 (six) hours as needed. (Patient not taking: Reported on 2023)      Mometasone Furoate 0.1 % External Cream Apply twice daily to affected area (Patient not taking: Reported on 2024) 45 g 1    pantoprazole 40 MG Oral Tab EC Take 1 tablet (40 mg total) by mouth 2 (two) times daily. (Patient not taking: Reported on 3/5/2024)      ALPRAZOLAM 0.5 MG Oral Tab Take 1 tab po 30 minute before radiation daily 30 tablet 0    cyanocobalamin 1000 MCG Oral Tab Take 1 tablet (1,000 mcg total) by mouth daily. (Patient not taking: Reported on 2024)      lisinopril 20 MG Oral Tab Take 1 tablet (20 mg total) by mouth daily.      MONTELUKAST 10 MG Oral Tab TAKE 1 TABLET(10 MG) BY MOUTH EVERY NIGHT (Patient not taking: Reported on 3/13/2024) 90 tablet 0    albuterol 108 (90 Base) MCG/ACT Inhalation Aero Soln Inhale 2 puffs into the lungs every 4 (four) hours as needed. 54 g 1    Budesonide-Formoterol Fumarate (SYMBICORT) 160-4.5 MCG/ACT Inhalation Aerosol INHALE 2 PUFFS BY MOUTH EVERY MORNING 1 each 5    fluticasone propionate 50 MCG/ACT Nasal Suspension SHAKE LIQUID AND USE 2  SPRAYS IN EACH NOSTRIL DAILY (Patient not taking: Reported on 3/13/2024) 48 g 1    EPINEPHrine 0.3 MG/0.3ML Injection Solution Auto-injector Inject 0.3 mL (1 each total) as directed one time. Injectf into the muscle one time as needed for anaphylaxis for up to 2 doses (Patient not taking: Reported on 1/23/2024)      Simethicone (GAS-X OR) daily.       Current Facility-Administered Medications on File Prior to Visit   Medication Dose Route Frequency Provider Last Rate Last Admin    [COMPLETED] heparin (Porcine) 100 Units/mL lock flush 500 Units  5 mL Intracatheter Once Marielle Louise MD   500 Units at 03/01/24 0925     SWALLOWING HISTORY  Current Swallowing Diagnosis: moderate pharyngeal dysphagia   Swallowing History:   History of Recent: No recent respiratory illness    Jeannette describes prior level of function WFL prior to radiation. Pt goals include wanting to eat/drink without difficulty.    ASSESSMENT  Jeannette presents to speech therapy evaluation with primary c/o difficulty swallowing. The results of the objective tests and measures show moderate pharyngeal dysphagia. Functional deficits include but are not limited to difficulty eating/drinking various foods. Pt and SLP discussed evaluation findings, pathology, POC and HEP.  Pt voiced understanding and performs HEP correctly. Skilled Speech Therapy is medically necessary to address the above impairments and reach functional goals.     OBJECTIVE:   ORAL MOTOR EXAMINATION  Dentition: some missing or damaged teeth from radiation  Facial and Oral Structure/Appearance: WFL; some lymphedema noted on neck (she is doing exercises for this)   Symmetry: WFL  Strength: reduced throughout  Tone: reduced throughout  Range of Motion: reduced throughout  Rate of Motion: reduced throughout    Consistencies Trialed: Thin liquid  Method of Presentation: Open cup  Patient Positioning: upright in chair    Oral phase of swallow within functional limits    Pharyngeal Phase of Swallow:  Moderate pharyngeal dysphagia - From VFSS report - Pharyngeal phase revealed minimal superior movement of thyroid cartilage, partial anterior hyoid excursion, and impaired laryngeal closure at the height of the swallow.   Impairments of the pharyngeal swallow resulted in deep laryngeal penetration and silent tracheal aspiration thin liquids. Swallow strategies assessed were chin tuck and modified supraglottic swallow(bolus hold, swallow with effort, cough, swallow again) which were effective in reducing the depth of penetration and eliminated aspiration. Patient at risk of aspiration due to decreased laryngeal integrity and reduced pharyngeal musculature strength.     Compensatory Swallow Strategies Utilized: Chin tuck, effortful swallow, supraglottic swallow    Today's Treatment:  Pt education was provided on exam findings, treatment diagnosis, treatment plan, expectations, and prognosis. Pt was also provided recommendations for aspiration precautions to use with all oral intake.      Patient was instructed in and issued a HEP for: aspiration precautions and pharyngeal exercises.     Charges: David x1, [id number]      Total Timed Treatment: 45 min     Total Treatment Time: 45 min     PLAN OF CARE:    Goals: (to be met in 6 visits)   1) Jeannette will perform pharyngeal exercises with 90% accuracy   2) Jeannette will use aspiration precautions with all oral intake.     Frequency / Duration: Patient will be seen for 1x/week or a total of 6 visits over a 90 day period.  Treatment will include: speech therapy, dysphagia therapy    Education or treatment limitation: Compliance  Rehab Potential:good    Patient/Family/Caregiver was advised of these findings, precautions, and treatment options and has agreed to actively participate in planning and for this course of care.    Thank you for your referral. Please co-sign or sign and return this letter via fax as soon as possible to [phone]. If you have any questions, please contact me  at Dept: [phone]    Sincerely,  Electronically signed by therapist: Wood Spear MS, CCC-SLP/L  Licensed Speech-Language Pathologist    Physician's certification required: Yes  I certify the need for these services furnished under this plan of treatment and while under my care.    X___________________________________________________ Date____________________    Certification From: 3/14/2024  To:6/12/2024

## 2024-03-19 ENCOUNTER — APPOINTMENT (OUTPATIENT)
Dept: SPEECH THERAPY | Age: 54
End: 2024-03-19
Attending: RADIOLOGY
Payer: MEDICAID

## 2024-03-27 ENCOUNTER — OFFICE VISIT (OUTPATIENT)
Dept: SPEECH THERAPY | Age: 54
End: 2024-03-27
Attending: RADIOLOGY
Payer: MEDICAID

## 2024-03-27 PROCEDURE — 92526 ORAL FUNCTION THERAPY: CPT

## 2024-03-27 NOTE — PROGRESS NOTES
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 HEP completed partially because she didn't know that she needed to do them 3x/day.     Pain: 0/10     Objective/Goals: (to be met in 6 visits)   1) Jeannette will perform pharyngeal exercises with 90% accuracy   Progress- all exercises were reviewed and practiced with the patient in the session. She did well with all of them and all questions were answered.     2) Jeannette will use aspiration precautions with all oral intake.   Progress- she reports that the chin tuck is hard with the pills so suggested adding them to puree bolus to see if that helps.       HEP: given to target pharyngeal exercises  Education: provided on exercises and rehabilitation of swallow muscles/function.     Assessment: patient presents with improved swallow function with continued xerostomia and stiffness in throat, neck, shoulders. All exercises and precautions were reviewed. Encourage consultation/evaluation with physical therapy PT to assess and given recommendations as warranted.      Plan: target dysphagia exercises; follow up in 2 weeks.

## 2024-04-04 RX ORDER — GABAPENTIN 300 MG/1
300 CAPSULE ORAL 3 TIMES DAILY
Qty: 270 CAPSULE | Refills: 0 | Status: CANCELLED | OUTPATIENT
Start: 2024-04-04

## 2024-04-04 RX ORDER — GABAPENTIN 300 MG/1
600 CAPSULE ORAL 3 TIMES DAILY
Qty: 180 CAPSULE | Refills: 1 | Status: SHIPPED | OUTPATIENT
Start: 2024-04-04

## 2024-04-10 ENCOUNTER — OFFICE VISIT (OUTPATIENT)
Dept: SPEECH THERAPY | Age: 54
End: 2024-04-10
Attending: RADIOLOGY
Payer: MEDICAID

## 2024-04-10 PROCEDURE — 92526 ORAL FUNCTION THERAPY: CPT

## 2024-04-10 NOTE — PROGRESS NOTES
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 We reviewed and completed pharyngeal swallowing exercises.     Pain: 0/10     Objective/Goals: (to be met in 6 visits)   1) Jeannette will perform pharyngeal exercises with 90% accuracy   Progress- all exercises were reviewed and practiced with the patient in the session. She did well with all of them and all questions were answered.     2) Jeannette will use aspiration precautions with all oral intake.   Progress- she reports that the chin tuck is hard with the pills so suggested adding them to puree bolus to see if that helps.       HEP: given to target pharyngeal exercises  Education: provided on exercises and rehabilitation of swallow muscles/function.     Assessment: patient presents with improved swallow function with continued xerostomia and stiffness in throat, neck, shoulders. All exercises and precautions were reviewed. Encourage consultation/evaluation with physical therapy PT to assess and given recommendations as warranted.      Plan: No further therapy is warranted. Discharge if not heard from patient in 2 months.

## 2024-05-06 RX ORDER — GABAPENTIN 300 MG/1
600 CAPSULE ORAL 3 TIMES DAILY
Qty: 180 CAPSULE | Refills: 1 | OUTPATIENT
Start: 2024-05-06

## 2024-05-07 DIAGNOSIS — K11.7 XEROSTOMIA DUE TO RADIOTHERAPY: Primary | ICD-10-CM
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
## 2024-05-07 RX ORDER — PILOCARPINE HYDROCHLORIDE 5 MG/1
TABLET, FILM COATED ORAL
Qty: 180 TABLET | Refills: 3 | Status: SHIPPED | OUTPATIENT
Start: 2024-05-07

## 2024-08-13 ENCOUNTER — HOSPITAL ENCOUNTER (EMERGENCY)
Facility: HOSPITAL | Age: 54
Discharge: HOME | End: 2024-08-13
Attending: EMERGENCY MEDICINE
Payer: MEDICAID

## 2024-08-13 VITALS
BODY MASS INDEX: 22.08 KG/M2 | DIASTOLIC BLOOD PRESSURE: 64 MMHG | RESPIRATION RATE: 16 BRPM | WEIGHT: 120 LBS | OXYGEN SATURATION: 98 % | SYSTOLIC BLOOD PRESSURE: 97 MMHG | HEIGHT: 62 IN | TEMPERATURE: 98 F | HEART RATE: 81 BPM

## 2024-08-13 DIAGNOSIS — L23.7 POISON IVY: Primary | ICD-10-CM

## 2024-08-13 PROCEDURE — 99283 EMERGENCY DEPT VISIT LOW MDM: CPT

## 2024-08-13 RX ORDER — TRIAMCINOLONE ACETONIDE 1 MG/G
1 CREAM TOPICAL 2 TIMES DAILY
Qty: 45 G | Refills: 0 | Status: SHIPPED | OUTPATIENT
Start: 2024-08-13

## 2024-08-13 RX ORDER — PREDNISONE 20 MG/1
40 TABLET ORAL DAILY
Qty: 10 TABLET | Refills: 0 | Status: SHIPPED | OUTPATIENT
Start: 2024-08-13 | End: 2024-08-18

## 2024-08-13 NOTE — ED PROVIDER NOTES
Patient Seen in: [facility] Emergency Department      History     Chief Complaint   Patient presents with    Rash Skin Problem     Poison ivy x 2 weeks     Stated Complaint: poison Ivy    Subjective:   HPI    Patient is a 54-year-old female presents to ED for evaluation of poison ivy.  Patient states she was exposed to poison ivy that she was cleaning out from a campsite 2 weeks ago.  Symptoms started a couple days after the poison ivy exposure.  She has been taking Benadryl.  Complains of rash on bilateral arms, buttock area.  Patient states it spreading.  No fevers.    Objective:   Past Medical History:    Anxiety    BLOOD DISORDER    anemia    Esophageal reflux    Extrinsic asthma, unspecified    Head and neck cancer (HCC)    High blood pressure    HTN (hypertension)    Hx of motion sickness    Irritable bowel syndrome    Migraines    PONV (postoperative nausea and vomiting)    \"violently ill\" at [facility] after Anesthesia; UI next month didn't have a problem, Anesthesia said was probably from the gas used at [place]    Problems with swallowing    5/3/23 pt states has been limited to soft foods d/t growth in throat; denies difficulty swallowing liquids    Reflux    Ulcer              Past Surgical History:   Procedure Laterality Date    Benign biopsy right  2013    FA    Endometrial ablation      Excision turbinate,submucous  2013    Procedure: ADENOIDECTOMY;  Surgeon: Mc Ayala MD;  Location: [facility]    Excision turbinate,submucous  2013    Procedure: ADENOIDECTOMY;  Surgeon: Mc Ayala MD;  Location: [facility]    Hc  section level i          Other surgical history      CS, esphogel polypectomy x2    Other surgical history      liposuction    Removal adenoids,primary,12+ y/o  2013    Procedure: ENDOSCOPIC SUBMUCOUS RESECTION INFERIOR TURBINATES;  Surgeon: Mc Ayala MD;  Location: [facility]    Repair of nasal  septum  08/14/2013    Procedure: SEPTOPLASTY NASAL;  Surgeon: Mc Ayala MD;  [facility]                Social History     Socioeconomic History    Marital status:     Number of children: 1   Occupational History    Occupation:      Comment: working 3 days per week   Tobacco Use    Smoking status: Every Day     Current packs/day: 0.50     Average packs/day: 0.5 packs/day for 30.0 years (15.0 ttl pk-yrs)     Types: Cigarettes    Smokeless tobacco: Former     Types: Chew     Quit date: 5/6/2008   Vaping Use    Vaping status: Never Used   Substance and Sexual Activity    Alcohol use: Yes     Alcohol/week: 5.0 standard drinks of alcohol     Types: 6 Standard drinks or equivalent per week     Comment: 10/06/2023 - Patient no longer consumes alcohol    Drug use: No   Social History Narrative    - lives alone    Has one grown son - lives in [place]      Social Determinants of Health      [facility] from [facility], [facility]    Social Connections    Received from [employer], [employer]    [facility]              Review of Systems    Positive for stated Chief Complaint: Rash Skin Problem (Poison ivy x 2 weeks)    Other systems are as noted in HPI.  Constitutional and vital signs reviewed.      All other systems reviewed and negative except as noted above.    Physical Exam     ED Triage Vitals [08/13/24 0047]   /83   Pulse 98   Resp 16   Temp 98.3 °F (36.8 °C)   Temp src Temporal   SpO2 97 %   O2 Device None (Room air)       Current Vitals:   Vital Signs  BP: 97/64  Pulse: 81  Resp: 16  Temp: 98.3 °F (36.8 °C)  Temp src: Temporal  MAP (mmHg): 76    Oxygen Therapy  SpO2: 98 %  O2 Device: None (Room air)            Physical Exam    Constitutional: Well-appearing in no acute distress  Skin: Patient has areas of scabbing noted right upper extremity.  Some rash noted slight redness left upper extremity.  Pictures noted  below.          ED Course   Labs Reviewed - No data to display                   [facility]      Patient is a 54-year-old female presents to ED for evaluation of rash.  Differential includes dermatitis, poison ivy.  Patient with poison ivy exposure and rash shows findings consistent with likely poison ivy with ichthyosis and some eschar formation due to itching.  Patient will be placed on topical triamcinolone and oral prednisone.  No indication for antibiotics.    Patient was screened and evaluated during this visit.   As a treating physician attending to the patient, I determined, within reasonable clinical confidence and prior to discharge, that an emergency medical condition was not or was no longer present.  There was no indication for further evaluation, treatment or admission on an emergency basis.  Comprehensive verbal and written discharge and follow-up instructions were provided to help prevent relapse or worsening.  Patient was instructed to follow-up with her primary care provider for further evaluation and treatment, but to return immediately to the ER for worsening, concerning, new, changing or persisting symptoms.  I discussed the case with the patient and they had no questions, complaints, or concerns.  Patient felt comfortable going home.               [facility]    Disposition and Plan     Clinical Impression:  1. Poison ivy         Disposition:  Discharge  8/13/2024  2:55 am    Follow-up:  Michael Yen MD  [address] [address]  [phone]    Follow up  As needed          Medications Prescribed:  Discharge Medication List as of 8/13/2024  2:57 AM        START taking these medications    Details   predniSONE 20 MG Oral Tab Take 2 tablets (40 mg total) by mouth daily for 5 days., Normal, Disp-10 tablet, R-0      triamcinolone 0.1 % External Cream Apply 1 Application topically 2 (two) times daily., Normal, Disp-45 g, R-0

## 2024-09-10 NOTE — PROGRESS NOTES
[facility] Note    Diagnosis: cT2 cN2 cM0 Laryngeal SCCa, p16+    Treatment History:   ChemoRT with weekly Cisplatin: 10/31/23-12/7/23    Visit Diagnosis:  1. Head and neck cancer (HCC)      History of Present Illness: 53F was referred by Dr. Floyd for [id number] squamous cell of the larynx.     -She has had 1 year of throat pain, R>L. This was associated with otalgia. She lost 10-15 lb. She has direct laryngoscopy x 2 that was negative. However, she noted more dysphagia and weight loss. Imaging from 9/13/23 was concerning for a suprglottic mass not involving the  pre-epiglottic or paraglottic space. No neck nodes. CT chest negative. Repeat ENT evaluation was consistent with Squamous cell carcinoma. She had a PET/CT done on 10/3/23 at [facility]-Noted to have bilateral cervical LN involvement. She has a 40 pack year smoking history and still smokes 1 PPD.     -Her case was discussed at [facility] and primary surgery vs. Primary RT were discussed for cT2 cN0 cM0 disease. She opted for primary RT. However this discussion was done before her PET results.    -On 10/3/23, I met her for the first time. We discussed her PET/CT results from earlier today showing bilateral cervical Frederic disease. We discussed that primary RT alone would not be recommended. We discussed that we should consider surgery, induction chemotherapy or chemoRT. She states that she is adamantly against chemotherapy. She states that the pain is the worse part. No current dysphagia. She eventually was agreeable to chemoRT.     -ChemoRT with weekly Cisplatin: 10/31/23-12/7.23    -PET/CT on 3/1/24: LUCERO in head/neck. 7 mm LLL lung nodule without uptake.     Interval History:  -Met with ENT in August 2024-No evidence of recurrence. Still smoking 1 PPD  -Most recent CT chest showed that her LLL nodule increased from 7 to 9 mm  -Tumor board today to discuss lung nodule  -Energy level is improving. Exercising daily  -Dealing with headaches and post-RT  pain-Dr. Case sent in tramadol  -Swallowing is better after dilation     Review of Systems: 12 Point ROS was completed and pertinent positives are in the HPI    Current Outpatient Medications on File Prior to Visit   Medication Sig Dispense Refill    guaiFENesin-codeine 100-10 MG/5ML Oral Solution Take 10 mL by mouth 4 (four) times daily as needed.      nicotine 14 MG/24HR Transdermal Patch 24 Hr PLACE 1 PATCH ONTO THE SKIN ONCE EVERY MORNING AS DIRECTED      pantoprazole 40 MG Oral Tab EC Take 1 tablet (40 mg total) by mouth 2 (two) times daily.      [] predniSONE 20 MG Oral Tab Take 2 tablets (40 mg total) by mouth daily for 5 days. 10 tablet 0    triamcinolone 0.1 % External Cream Apply 1 Application topically 2 (two) times daily. (Patient not taking: Reported on 2024) 45 g 0    PILOCARPINE 5 MG Oral Tab 2 tab po tid 180 tablet 3    gabapentin 300 MG Oral Cap Take 2 capsules (600 mg total) by mouth in the morning, at noon, and at bedtime. 180 capsule 1    famotidine 40 MG Oral Tab       ALPRAZOLAM 0.5 MG Oral Tab Take 1 tab po 30 minute before radiation daily 30 tablet 0    cyanocobalamin 1000 MCG Oral Tab Take 1 tablet (1,000 mcg total) by mouth daily.      lisinopril 20 MG Oral Tab Take 1 tablet (20 mg total) by mouth daily. (Patient not taking: Reported on 2024)      MONTELUKAST 10 MG Oral Tab TAKE 1 TABLET(10 MG) BY MOUTH EVERY NIGHT 90 tablet 0    albuterol 108 (90 Base) MCG/ACT Inhalation Aero Soln Inhale 2 puffs into the lungs every 4 (four) hours as needed. 54 g 1    Budesonide-Formoterol Fumarate (SYMBICORT) 160-4.5 MCG/ACT Inhalation Aerosol INHALE 2 PUFFS BY MOUTH EVERY MORNING 1 each 5    fluticasone propionate 50 MCG/ACT Nasal Suspension SHAKE LIQUID AND USE 2 SPRAYS IN EACH NOSTRIL DAILY 48 g 1    EPINEPHrine 0.3 MG/0.3ML Injection Solution Auto-injector Inject 0.3 mL (1 each total) as directed one time. Injectf into the muscle one time as needed for anaphylaxis for up to 2 doses  (Patient not taking: Reported on 2024)       No current facility-administered medications on file prior to visit.     Past Medical History:    Anxiety    BLOOD DISORDER    anemia    Esophageal reflux    Extrinsic asthma, unspecified    Head and neck cancer (HCC)    High blood pressure    HTN (hypertension)    Hx of motion sickness    Irritable bowel syndrome    Migraines    PONV (postoperative nausea and vomiting)    \"violently ill\" at [facility] after Anesthesia; UI next month didn't have a problem, Anesthesia said was probably from the gas used at [place]    Problems with swallowing    5/3/23 pt states has been limited to soft foods d/t growth in throat; denies difficulty swallowing liquids    Reflux    Ulcer     Past Surgical History:   Procedure Laterality Date    Benign biopsy right  2013    FA    Endometrial ablation      Excision turbinate,submucous  2013    Procedure: ADENOIDECTOMY;  Surgeon: Mc Ayala MD;  Location: [facility]    Excision turbinate,submucous  2013    Procedure: ADENOIDECTOMY;  Surgeon: Mc Ayala MD;  Location: [facility]  section level i      1990    Other surgical history      CS, esphogel polypectomy x2    Other surgical history      liposuction    Removal adenoids,primary,12+ y/o  2013    Procedure: ENDOSCOPIC SUBMUCOUS RESECTION INFERIOR TURBINATES;  Surgeon: Mc Ayala MD;  Location: [facility]    Repair of nasal septum  2013    Procedure: SEPTOPLASTY NASAL;  Surgeon: Mc Ayala MD;  Location: [facility]     Social History     Socioeconomic History    Marital status:     Number of children: 1   Occupational History    Occupation: [employer]     Comment: working 3 days per week   Tobacco Use    Smoking status: Every Day     Current packs/day: 0.50     Average packs/day: 0.5 packs/day for 30.0 years (15.0 ttl pk-yrs)     Types: Cigarettes    Smokeless tobacco:  Former     Types: Chew     Quit date: 5/6/2008   Vaping Use    Vaping status: Never Used   Substance and Sexual Activity    Alcohol use: Yes     Alcohol/week: 5.0 standard drinks of alcohol     Types: 6 Standard drinks or equivalent per week     Comment: 10/06/2023 - Patient no longer consumes alcohol    Drug use: No      Family History   Problem Relation Age of Onset    Cancer Father         esophageal/lung    Breast Cancer Paternal Grandmother 60        age 60's       Physical Exam  Height: --  Weight: 54.2 kg (119 lb 6.4 oz) (09/11 0847)  BSA (Calculated - sq m): --  Pulse: 91 (09/11 0847)  BP: 126/91 (09/11 0847)  Temp: 97.8 °F (36.6 °C) (09/11 0847)  Do Not Use - Resp Rate: --  SpO2: 99 % (09/11 0847)    General: NAD, AOX3  HEENT: clear op, mmm, no jvd, no scleral icterus  LN: Neck LAD has resolved   CV: RRR S1S2 no murmurs  Extremities: No edema   Lungs:no increased work of breathing  Abd: soft nt nd +BS no hepatosplenomegaly  Neuro: CN: II-XII grossly intact      Results:  Lab Results   Component Value Date    WBC 3.9 (L) 01/23/2024    HGB 11.2 (L) 01/23/2024    HCT 32.8 (L) 01/23/2024    MCV 95.3 01/23/2024    .0 01/23/2024     Lab Results   Component Value Date     01/23/2024    K 3.8 01/23/2024    CO2 24.0 01/23/2024     01/23/2024    BUN 16 01/23/2024    GLUCOSE 91 10/17/2014    ALB 3.5 01/23/2024       No results found for: \"LDH\"    Radiology: reviewed   PET/CT   1. Large markedly hypermetabolic mass centered at the right epiglottis with involvement of the right aryepiglottic fold and piriform sinus, consistent with the known malignancy.   2. Bilateral FDG-avid cervical lymph nodes, as detailed above, suspicious for metastatic involvement.   3. There is an FDG avid node positioned anterior to the right-sided , corresponding to a small enhancing focus seen on the previous CT, is of uncertain clinical significance. Attention on follow-up imaging.   4. No FDG PET evidence of  distant metastases.     Addendum    Tumor cells are positive for p16 (>75% of tumor cells)  Original diagnosis remains unchanged       Pathology: reviewed   A. Larynx, Supraglottic Mass; Biopsy:  Superficial fragments of squamous cell carcinoma with necrosis     B. Larynx, Supraglottic Mass; Larynx, Biopsy:  Fragments of moderately to poorly differentiated invasive squamous cell carcinoma  See comment    Assessment and Plan:  cT2 cN2 cM0 Laryngeal SCCa, p16+  -we reviewed NCCN guidelines and discussed primary surgery, chemoRT or induction chemotherapy. She initially stated that she is adamantly against chemotherapy and does not want chemotherapy. She eventually agreed with chemoRT with weekly Cisplatin 40 mg/m2 which finished on 12/7/23  -PET/CT on 3/1/24 with LUCERO. No further imaging unless new symptoms  -PRN IVF  -Following with ENT for serial exams, Dr. Floyd at [facility]  -Continue to work with PT and speech therapy     LLL Nodule: not pet avid-repeat CT chest showed that nodule went from 7 mm to 9 mm. Reviewed at [facility]. Patient is agreeable to resection if recommended.     Current smoker: 1 PPD-smoking cessation discussed       Ok to remove port pending tumor board discussion.     MDM: high: head and neck cancer    RTC in 6 months     [facility]

## 2024-09-11 NOTE — PROGRESS NOTES
Nursing Follow-Up Note    Patient: Jeannette Morales  YOB: 1970  Age: 54 year old  Radiation Oncologist: Dr. Liane Case  Referring Physician: Dr. Louise  Chief Complaint:   Chief Complaint   Patient presents with    Follow - Up     H/N CA     Date: 9/11/2024    Toxicities: N/A    Vital Signs: BP (!) 126/91 (BP Location: Left arm, Patient Position: Sitting, Cuff Size: adult)   Pulse 91   Temp 97.8 °F (36.6 °C) (Tympanic)   Resp 20   Wt 54.2 kg (119 lb 6.4 oz)   SpO2 99%   BMI 21.84 kg/m² ,   Wt Readings from Last 6 Encounters:   09/11/24 54.2 kg (119 lb 6.4 oz)   08/13/24 54.4 kg (120 lb)   03/13/24 54.9 kg (121 lb)   03/05/24 58.1 kg (128 lb)   01/31/24 57.2 kg (126 lb)   01/23/24 54.7 kg (120 lb 9.6 oz)       Allergies:    Allergies   Allergen Reactions    Latex RASH and OTHER (SEE COMMENTS)    Ceftin [Cefuroxime] RASH    Codeine     Oxycodone     Vicodin [Hydrocodone-Acetaminophen] OTHER (SEE COMMENTS)     Per patient allergy was noted 20 years ago, uncofirmed if true allergy     Clindamycin RASH    Penicillins RASH       Nursing Note: Pt completed concurrent chemoRT to larynx on 12/7/23. Followed up with ENT on 8/27/24, scope done which showed supraglottic swelling. States has esophageal stretching in May 2024. Has CT of chest on 9/4/24, here to discuss findings. Pt to follow-up with Dr. Louise today as well. Pt continues to have xerostomia, dryness in throat. Some dysphagia d/t xerostomia; appetite low. Eating mainly soft foods. Saw dentist in August, had teeth cleaning done. No trismus noted. Has intermittent lymphedema and stiffness felt to neck and shoulders, does massages and exercises. Takes Tylenol with Advil in between for neck pain. Will have follow-up with speech at [facility] in November. Cont to have hoarseness of voice. Has intermittent cough, states related to post-nasal drip from allergies; is on Z-pack for possible URI.

## 2024-09-11 NOTE — TELEPHONE ENCOUNTER
Talked to Jeannette about lung conference discussion. She would like to pursue lung biopsy, not resection yet. Will connect her with Dr. Brantley.

## 2024-09-11 NOTE — PROGRESS NOTES
[facility]  RADIATION ONCOLOGY   FOLLOW UP     Jeannette Morales  4/20/1970    DIAGNOSIS: SGL SCCA      CANCER HISTORY   -sore throat, dysphagia, hoarseness, right neck and ear pain  -some delay to dx; DL EUA biopsy at [facility] 9/11/23:  centered in epiglottis, +R AEF, abut BOT, neg cords, neg HPX  -PET:  Oct 2023 - SUV 20 at primary site, SUV 3-5 bilateral small cervical LAD  -RT with cisplatin (70 Gy in 35 fx, completed 12/7/2023)    INTERIM HISTORY   Dr Angely Floyd ENT [facility]: last exam was ok late Aug 2024  Neck edema: mild, does self massage and stretch  Plans to see pcp about her asymptomatic fibroid and start breast cancer screening  Still smoking b/c cannot get insurance coverage for wellbutrin and nicotine patches  Had upper esophageal dilation by Dr Floyd with improvement  Had dilation of distal esophagus in the past for GERD by GI    Video swallow:  silent aspiration  Saw Wood at speech    Would like tramadol to use on occasion for stiff neck pain from past RT    CT chest 9/4/24L  9 mm LLL nodule, was PET negative in 3/2024 when it was 7 mm; no LAD, probably stable vs CT chest in 9/2023 (Jeannette Morales)    EXAM   119#  Minimal neck edema; stiff  No adenopathy  Skin healed - a little dry right low neck  No trismus  Hoarse    IMPRESSION/PLAN   9 mo out from RT/cisplatin    -PET with CR; exam LUCERO  -has defect in AEF/epiglottis; saw speech    -discussed LLL nodule in lung conference  -she wants to go for lung biopsy  -will see Dr. Frecho Case MD  Radiation Oncology  [email or web address]  [phone]    CC: MD MICHELLE Stahl MD    20 minutes were spent with the patient/family, more than 50 percent on counseling/coordination of care (discuss disease status, management of any side effects, future follow up plans)

## 2024-09-11 NOTE — PROGRESS NOTES
Patient here for follow-up. Had recent CT earlier this month. Energy levels are still up and down. Still having neck pain and headaches. Neuropathy worse due to decreasing gabapentin. States she is off balanced.

## 2024-09-11 NOTE — PATIENT INSTRUCTIONS
- WE WILL CALL TO SCHEDULE YOUR FOLLOW-UP APPOINTMENT WITH  IN 6 MONTHS.      - CALL [phone] IF YOU HAVE ANY QUESTIONS/CONCERNS REGARDING RADIATION THERAPY

## 2024-09-20 NOTE — H&P (VIEW-ONLY)
[facility] General Pulmonary Consult Note    Chief Complaint:  Chief Complaint   Patient presents with    Follow - Up     Pre bronch consult CT         History of Present Illness:  Jeannette Morales is a 54 year old female with significant PMH of head and neck cancer status postchemotherapy in 2023 who presents today for evaluation of lung nodule.  Patient had 7 mm nodule on PET scan back from 2024 and is now growing to 9 mm on last CT scan.  Patient denies any fevers, chills, night sweats, or weight loss.  Patient has smoked for 40+ years, cancer history as above.      Past Medical History:   Past Medical History:    ALCOHOL USE    Allergic rhinitis    Anemia    Anxiety    Asthma (HCC)    BLOOD DISORDER    anemia    Esophageal reflux    Extrinsic asthma, unspecified    Head and neck cancer (HCC)    High blood pressure    HTN (hypertension)    Hx of motion sickness    Irritable bowel syndrome    Migraines    Pneumonia due to organism    PONV (postoperative nausea and vomiting)    \"violently ill\" at [facility] after Anesthesia; UI next month didn't have a problem, Anesthesia said was probably from the gas used at [place]    Problems with swallowing    5/3/23 pt states has been limited to soft foods d/t growth in throat; denies difficulty swallowing liquids    Reflux    Ulcer        Past Surgical History:   Past Surgical History:   Procedure Laterality Date    Benign biopsy right  2013    FA          Colonoscopy      D & c      Endometrial ablation      Excision turbinate,submucous  2013    Procedure: ADENOIDECTOMY;  Surgeon: Mc Ayala MD;  Location: [facility]    Excision turbinate,submucous  2013    Procedure: ADENOIDECTOMY;  Surgeon: Mc Ayala MD;  Location: [facility]    Hc  section level i          Other surgical history      CS, esphogel polypectomy x2    Other surgical history      liposuction    Removal adenoids,primary,12+  y/o  08/14/2013    Procedure: ENDOSCOPIC SUBMUCOUS RESECTION INFERIOR TURBINATES;  Surgeon: Mc Ayala MD;  Location: [facility]    Repair of nasal septum  08/14/2013    Procedure: SEPTOPLASTY NASAL;  Surgeon: Mc Ayala MD;  Location: [facility]       Family Medical History:   Family History   Problem Relation Age of Onset    Cancer Father         Esophagus and lung cancer    Diabetes Father     Breast Cancer Paternal Grandmother 60        age 60's        Social History:   Social History     Socioeconomic History    Marital status:      Spouse name: Not on file    Number of children: 1    Years of education: Not on file    Highest education level: Not on file   Occupational History    Occupation: [employer]     Comment: working 3 days per week   Tobacco Use    Smoking status: Every Day     Current packs/day: 0.50     Average packs/day: 0.5 packs/day for 50.7 years (25.4 ttl pk-yrs)     Types: Cigarettes     Start date: 1974     Passive exposure: Past    Smokeless tobacco: Former     Types: Chew     Quit date: 5/6/2008   Vaping Use    Vaping status: Never Used   Substance and Sexual Activity    Alcohol use: Yes     Alcohol/week: 6.0 standard drinks of alcohol     Types: 6 Standard drinks or equivalent per week     Comment: 10/06/2023 - Patient no longer consumes alcohol    Drug use: No    Sexual activity: Not on file   Other Topics Concern    Not on file   Social History Narrative    - lives alone    Has one grown son - lives in [place]      Social Determinants of Health     Financial Resource Strain: Not on file   Food Insecurity: Not on file   Transportation Needs: Not on file   Physical Activity: Not on file   Stress: Not on file   Social Connections: Unknown (6/1/2021)    [facility]    Social Connections     Conversations with friends/family/neighbors per week: Not on file   Housing Stability: At Risk  (8/18/2023)    Received from [facility]     Living Situation: Not on file     Housing Problems: Not on file        Allergies: Latex, Ceftin [cefuroxime], Codeine, Oxycodone, Vicodin [hydrocodone-acetaminophen], Clindamycin, and Penicillins     Medications:   Current Outpatient Medications   Medication Sig Dispense Refill    azithromycin 250 MG Oral Tab Take two tablets today, then one tablet daily for 4 more days      buPROPion HCl ER, Smoking Det, 150 MG Oral Tablet 12 Hr 1 tablet (150 mg total) 2 (two) times daily.      TRAMADOL 50 MG Oral Tab 1 tab po bid prn pain 30 tablet 0    guaiFENesin-codeine 100-10 MG/5ML Oral Solution Take 10 mL by mouth 4 (four) times daily as needed.      nicotine 14 MG/24HR Transdermal Patch 24 Hr PLACE 1 PATCH ONTO THE SKIN ONCE EVERY MORNING AS DIRECTED      pantoprazole 40 MG Oral Tab EC Take 1 tablet (40 mg total) by mouth 2 (two) times daily.      triamcinolone 0.1 % External Cream Apply 1 Application topically 2 (two) times daily. 45 g 0    PILOCARPINE 5 MG Oral Tab 2 tab po tid 180 tablet 3    gabapentin 300 MG Oral Cap Take 2 capsules (600 mg total) by mouth in the morning, at noon, and at bedtime. 180 capsule 1    famotidine 40 MG Oral Tab       ALPRAZOLAM 0.5 MG Oral Tab Take 1 tab po 30 minute before radiation daily 30 tablet 0    cyanocobalamin 1000 MCG Oral Tab Take 1 tablet (1,000 mcg total) by mouth daily.      lisinopril 20 MG Oral Tab Take 1 tablet (20 mg total) by mouth daily.      MONTELUKAST 10 MG Oral Tab TAKE 1 TABLET(10 MG) BY MOUTH EVERY NIGHT 90 tablet 0    albuterol 108 (90 Base) MCG/ACT Inhalation Aero Soln Inhale 2 puffs into the lungs every 4 (four) hours as needed. 54 g 1    Budesonide-Formoterol Fumarate (SYMBICORT) 160-4.5 MCG/ACT Inhalation Aerosol INHALE 2 PUFFS BY MOUTH EVERY MORNING 1 each 5    fluticasone propionate 50 MCG/ACT Nasal Suspension SHAKE LIQUID AND USE 2 SPRAYS IN EACH NOSTRIL DAILY 48 g 1    EPINEPHrine  0.3 MG/0.3ML Injection Solution Auto-injector Inject 0.3 mL (1 each total) as directed one time. Injectf into the muscle one time as needed for anaphylaxis for up to 2 doses         Review of Systems: Review of Systems    Physical Exam:  /82 (BP Location: Right arm, Patient Position: Sitting, Cuff Size: adult)   Pulse 89   Resp 16   Ht 5' 2\" (1.575 m)   Wt 119 lb (54 kg)   SpO2 98%   BMI 21.77 kg/m²      Constitutional: alert, cooperative. No acute distress.  HEENT: Head NC/AT. Nares normal. Septum midline. Mucosa normal. No drainage or sinus tenderness.. Mallampati 2+  Cardio: Regular rate and rhythm. Normal S1 and S2. No murmurs.   Respiratory: Thorax symmetrical with no labored breathing. rhonchi bilaterally  GI: NABS. Abd soft, non-tender.  Extremities: No clubbing or cyanosis. No BLE edema.    Neurologic: A&Ox3. No gross motor deficits.  Skin: Warm, dry  Psych: Calm, cooperative. Pleasant affect.    Results:  Personally reviewed  WBC: 4.6, done on 9/11/2024.  HGB: 12.5, done on 9/11/2024.  PLT: 181, done on 9/11/2024.     Glucose: 109, done on 9/11/2024.  Cr: 0.85, done on 9/11/2024.  Last eGFR was 81 on 9/11/2024.  CA: 9.7, done on 9/11/2024.  Na: 136, done on 9/11/2024.  K: 3.7, done on 9/11/2024.  Cl: 107, done on 9/11/2024.  CO2: 27, done on 9/11/2024.  Last ALB was 4.5% done on 9/11/2024.     CT CHEST (CPT=71250)    Result Date: 9/4/2024  CONCLUSION:  The nodule at the left lung base has increased in size and now measures up to 9 mm compared with the prior PET/CT of 3/1/2024 where it measured up to 7 mm.  This is concerning for an area of developing malignancy and may be better characterized with follow-up PET/CT or tissue sampling.  LOCATION:  [facility]   Dictated by (CST): Mac Musa MD on 9/04/2024 at 9:54 AM     Finalized by (CST): Mac Musa MD on 9/04/2024 at 10:00 AM         Assessment/Plan:  #1.  9 mm growing nodule  We discussed CT findings in detail and I reviewed images with patient  We  discussed role of bronchoscopy  with EBUS guided biopsy and robot assisted navigational bronchoscopy  Risks including but limited to bleeding, infection, pneumothorax, bronchospasm were discussed  Alternatives such as surgical biopsy vs CT guided biopsy were discussed  Patient understands procedure, risks, benefits, and alternatives and would like to proceed, will get scheduled as soon as feasible      #2.  Likely COPD  Start Breztri, stop Symbicort  Start DuoNebs  Check PFTs down the line    #3. Lung Cancer Surveillance/Screening  Lung Cancer Counseling and Shared Decision Making Session in an Asymptomatic Smoker/Former Smoker   Jeannette Morales is a 54 year old female without current symptoms of lung cancer.  History   Smoking Status    Every Day    Types: Cigarettes   Smokeless Tobacco    Former    Types: Chew    Quit date: 5/6/2008        She received information on the importance of adherence to annual lung cancer Low Dose CT (LDCT) screening, the impact of her comorbidities and her ability or willingness to undergo diagnosis and treatment. she is in agreement and an order will be placed for CT LUNG LD SCREENING(CPT=71271).    We counseled the importance of maintaining cigarette smoking abstinence if she is a former smoker and the importance of smoking cessation if she is a current smoker and we discussed and furnished information about tobacco cessation interventions.        No follow-ups on file.    Hany Brantley MD  9/20/2024

## 2024-09-20 NOTE — PROGRESS NOTES
[facility] General Pulmonary Consult Note    Chief Complaint:  Chief Complaint   Patient presents with    Follow - Up     Pre bronch consult CT         History of Present Illness:  Jeannette Morales is a 54 year old female with significant PMH of head and neck cancer status postchemotherapy in 2023 who presents today for evaluation of lung nodule.  Patient had 7 mm nodule on PET scan back from 2024 and is now growing to 9 mm on last CT scan.  Patient denies any fevers, chills, night sweats, or weight loss.  Patient has smoked for 40+ years, cancer history as above.      Past Medical History:   Past Medical History:    ALCOHOL USE    Allergic rhinitis    Anemia    Anxiety    Asthma (HCC)    BLOOD DISORDER    anemia    Esophageal reflux    Extrinsic asthma, unspecified    Head and neck cancer (HCC)    High blood pressure    HTN (hypertension)    Hx of motion sickness    Irritable bowel syndrome    Migraines    Pneumonia due to organism    PONV (postoperative nausea and vomiting)    \"violently ill\" at [facility] after Anesthesia; UI next month didn't have a problem, Anesthesia said was probably from the gas used at [place]    Problems with swallowing    5/3/23 pt states has been limited to soft foods d/t growth in throat; denies difficulty swallowing liquids    Reflux    Ulcer        Past Surgical History:   Past Surgical History:   Procedure Laterality Date    Benign biopsy right  2013    FA          Colonoscopy      D & c      Endometrial ablation      Excision turbinate,submucous  2013    Procedure: ADENOIDECTOMY;  Surgeon: Mc Ayala MD;  Location: [facility]    Excision turbinate,submucous  2013    Procedure: ADENOIDECTOMY;  Surgeon: Mc Ayala MD;  Location: [facility]    Hc  section level i          Other surgical history      CS, esphogel polypectomy x2    Other surgical history      liposuction    Removal adenoids,primary,12+  y/o  08/14/2013    Procedure: ENDOSCOPIC SUBMUCOUS RESECTION INFERIOR TURBINATES;  Surgeon: Mc Ayala MD;  Location: [facility]    Repair of nasal septum  08/14/2013    Procedure: SEPTOPLASTY NASAL;  Surgeon: Mc Ayala MD;  Location: [facility]       Family Medical History:   Family History   Problem Relation Age of Onset    Cancer Father         Esophagus and lung cancer    Diabetes Father     Breast Cancer Paternal Grandmother 60        age 60's        Social History:   Social History     Socioeconomic History    Marital status:      Spouse name: Not on file    Number of children: 1    Years of education: Not on file    Highest education level: Not on file   Occupational History    Occupation: [employer]     Comment: working 3 days per week   Tobacco Use    Smoking status: Every Day     Current packs/day: 0.50     Average packs/day: 0.5 packs/day for 50.7 years (25.4 ttl pk-yrs)     Types: Cigarettes     Start date: 1974     Passive exposure: Past    Smokeless tobacco: Former     Types: Chew     Quit date: 5/6/2008   Vaping Use    Vaping status: Never Used   Substance and Sexual Activity    Alcohol use: Yes     Alcohol/week: 6.0 standard drinks of alcohol     Types: 6 Standard drinks or equivalent per week     Comment: 10/06/2023 - Patient no longer consumes alcohol    Drug use: No    Sexual activity: Not on file   Other Topics Concern    Not on file   Social History Narrative    - lives alone    Has one grown son - lives in [place]      Social Determinants of Health     Financial Resource Strain: Not on file   Food Insecurity: Not on file   Transportation Needs: Not on file   Physical Activity: Not on file   Stress: Not on file   Social Connections: Unknown (6/1/2021)    [facility]    Social Connections     Conversations with friends/family/neighbors per week: Not on file   Housing Stability: At Risk  (8/18/2023)    Received from [facility]     Living Situation: Not on file     Housing Problems: Not on file        Allergies: Latex, Ceftin [cefuroxime], Codeine, Oxycodone, Vicodin [hydrocodone-acetaminophen], Clindamycin, and Penicillins     Medications:   Current Outpatient Medications   Medication Sig Dispense Refill    azithromycin 250 MG Oral Tab Take two tablets today, then one tablet daily for 4 more days      buPROPion HCl ER, Smoking Det, 150 MG Oral Tablet 12 Hr 1 tablet (150 mg total) 2 (two) times daily.      TRAMADOL 50 MG Oral Tab 1 tab po bid prn pain 30 tablet 0    guaiFENesin-codeine 100-10 MG/5ML Oral Solution Take 10 mL by mouth 4 (four) times daily as needed.      nicotine 14 MG/24HR Transdermal Patch 24 Hr PLACE 1 PATCH ONTO THE SKIN ONCE EVERY MORNING AS DIRECTED      pantoprazole 40 MG Oral Tab EC Take 1 tablet (40 mg total) by mouth 2 (two) times daily.      triamcinolone 0.1 % External Cream Apply 1 Application topically 2 (two) times daily. 45 g 0    PILOCARPINE 5 MG Oral Tab 2 tab po tid 180 tablet 3    gabapentin 300 MG Oral Cap Take 2 capsules (600 mg total) by mouth in the morning, at noon, and at bedtime. 180 capsule 1    famotidine 40 MG Oral Tab       ALPRAZOLAM 0.5 MG Oral Tab Take 1 tab po 30 minute before radiation daily 30 tablet 0    cyanocobalamin 1000 MCG Oral Tab Take 1 tablet (1,000 mcg total) by mouth daily.      lisinopril 20 MG Oral Tab Take 1 tablet (20 mg total) by mouth daily.      MONTELUKAST 10 MG Oral Tab TAKE 1 TABLET(10 MG) BY MOUTH EVERY NIGHT 90 tablet 0    albuterol 108 (90 Base) MCG/ACT Inhalation Aero Soln Inhale 2 puffs into the lungs every 4 (four) hours as needed. 54 g 1    Budesonide-Formoterol Fumarate (SYMBICORT) 160-4.5 MCG/ACT Inhalation Aerosol INHALE 2 PUFFS BY MOUTH EVERY MORNING 1 each 5    fluticasone propionate 50 MCG/ACT Nasal Suspension SHAKE LIQUID AND USE 2 SPRAYS IN EACH NOSTRIL DAILY 48 g 1    EPINEPHrine  0.3 MG/0.3ML Injection Solution Auto-injector Inject 0.3 mL (1 each total) as directed one time. Injectf into the muscle one time as needed for anaphylaxis for up to 2 doses         Review of Systems: Review of Systems    Physical Exam:  /82 (BP Location: Right arm, Patient Position: Sitting, Cuff Size: adult)   Pulse 89   Resp 16   Ht 5' 2\" (1.575 m)   Wt 119 lb (54 kg)   SpO2 98%   BMI 21.77 kg/m²      Constitutional: alert, cooperative. No acute distress.  HEENT: Head NC/AT. Nares normal. Septum midline. Mucosa normal. No drainage or sinus tenderness.. Mallampati 2+  Cardio: Regular rate and rhythm. Normal S1 and S2. No murmurs.   Respiratory: Thorax symmetrical with no labored breathing. rhonchi bilaterally  GI: NABS. Abd soft, non-tender.  Extremities: No clubbing or cyanosis. No BLE edema.    Neurologic: A&Ox3. No gross motor deficits.  Skin: Warm, dry  Psych: Calm, cooperative. Pleasant affect.    Results:  Personally reviewed  WBC: 4.6, done on 9/11/2024.  HGB: 12.5, done on 9/11/2024.  PLT: 181, done on 9/11/2024.     Glucose: 109, done on 9/11/2024.  Cr: 0.85, done on 9/11/2024.  Last eGFR was 81 on 9/11/2024.  CA: 9.7, done on 9/11/2024.  Na: 136, done on 9/11/2024.  K: 3.7, done on 9/11/2024.  Cl: 107, done on 9/11/2024.  CO2: 27, done on 9/11/2024.  Last ALB was 4.5% done on 9/11/2024.     CT CHEST (CPT=71250)    Result Date: 9/4/2024  CONCLUSION:  The nodule at the left lung base has increased in size and now measures up to 9 mm compared with the prior PET/CT of 3/1/2024 where it measured up to 7 mm.  This is concerning for an area of developing malignancy and may be better characterized with follow-up PET/CT or tissue sampling.  LOCATION:  [place]   Dictated by (CST): Mac Musa MD on 9/04/2024 at 9:54 AM     Finalized by (CST): Mac Musa MD on 9/04/2024 at 10:00 AM         Assessment/Plan:  #1.  9 mm growing nodule  We discussed CT findings in detail and I reviewed images with patient  We  discussed role of bronchoscopy  with EBUS guided biopsy and robot assisted navigational bronchoscopy  Risks including but limited to bleeding, infection, pneumothorax, bronchospasm were discussed  Alternatives such as surgical biopsy vs CT guided biopsy were discussed  Patient understands procedure, risks, benefits, and alternatives and would like to proceed, will get scheduled as soon as feasible      #2.  Likely COPD  Start Breztri, stop Symbicort  Start DuoNebs  Check PFTs down the line    #3. Lung Cancer Surveillance/Screening  Lung Cancer Counseling and Shared Decision Making Session in an Asymptomatic Smoker/Former Smoker   Jeannette Morales is a 54 year old female without current symptoms of lung cancer.  History   Smoking Status    Every Day    Types: Cigarettes   Smokeless Tobacco    Former    Types: Chew    Quit date: 5/6/2008        She received information on the importance of adherence to annual lung cancer Low Dose CT (LDCT) screening, the impact of her comorbidities and her ability or willingness to undergo diagnosis and treatment. she is in agreement and an order will be placed for CT LUNG LD SCREENING(CPT=71271).    We counseled the importance of maintaining cigarette smoking abstinence if she is a former smoker and the importance of smoking cessation if she is a current smoker and we discussed and furnished information about tobacco cessation interventions.        No follow-ups on file.    Hany Brantley MD  9/20/2024

## 2024-10-03 NOTE — TELEPHONE ENCOUNTER
Spoke with patient. Dr. Louise does want patient to have her biopsy. She verbalized understanding.

## 2024-10-03 NOTE — TELEPHONE ENCOUNTER
Pt called and would like to know if Dr. Louise wants her to have a biopsy     Please give pt a call back

## 2024-10-07 NOTE — PROGRESS NOTES
Palliative Care Follow Up Note     Patient Name: Jeannette Morales   YOB: 1970   Medical Record Number: [id number]   CSN: [id number]   Date of visit: 10/7/2024     Chief Complaint/Reason for Visit:  Pain follow up     History of Present Illness:         Jeannette Morales is a 54 year old female with larynx cancer who completed chemo/RT.    She hs not followed with palliative care in a while since pain had been controlled and she was off all opioids.  She is caty today complaining of new L ear,cheek,jaw,neck,shoulder pain for the last month or so.  Its constant deep achy pain that affects sleep.  This pain is not responding to tramadol or tylenol.  She has been using tramadol with acetaminophen BID with only slight benefit lasting no more than 2 hrs.  She is supplementing with 1G of acetaminophen at least 3 times daily without benefit.  She states this pain is identical to the pain she had a year ago when first diagnosed with cancer.  She is worried this could be her cancer returning.  She also has a biopsy of lung scheduled this week to r/o metastatic cancer.  She has no lung symptoms.   She continues to struggle with taste but is eating and drinking.  She has not been able to to return to work due to ongoing fatigue since treatment and hoarse voice making it difficult to talk.   She is here today to discuss her pain and concerns.  She does have chronic sinus issues managed by her PCP.  She feels these new symptoms have not improved despite multiple interventions and medications from PCP.               Problem List:  Patient Active Problem List   Diagnosis    Asthma (HCC)    Deviated nasal septum    GERD (gastroesophageal reflux disease)    Tobacco abuse    Fibroadenoma    Verruca    Sinusitis    Knee contusion    Esophageal spasm    Iron deficiency anemia due to chronic blood loss    Sinusitis, acute    Subacute pansinusitis    Iron deficiency    Mass of breast, right    Breast tenderness    Chronic  bronchitis (HCC)    Persistent cough    Fatigue, unspecified type    Menorrhagia with regular cycle    Anxiety    Epiglottic lesion    Seasonal allergies    Sore throat    Head and neck cancer (HCC)    Malignant neoplasm of overlapping sites of larynx (HCC)    Palliative care by specialist    HTN (hypertension)    Nausea      Medical History:  Past Medical History:    ALCOHOL USE    Allergic rhinitis    Anemia    Anxiety    Asthma (HCC)    BLOOD DISORDER    anemia    Esophageal reflux    Exposure to medical diagnostic radiation    Last treatment 2024    Extrinsic asthma, unspecified    Head and neck cancer (HCC)    High blood pressure    HTN (hypertension)    Hx of motion sickness    Irritable bowel syndrome    Migraines    Personal history of antineoplastic chemotherapy    Last treatment 2024    Pneumonia due to organism    PONV (postoperative nausea and vomiting)    \"violently ill\" at [place] after Anesthesia; UI next month didn't have a problem, Anesthesia said was probably from the gas used at [place]; 2024: Woke up from anesthesia during last procedure    Problems with swallowing    5/3/23 pt states has been limited to soft foods d/t growth in throat; denies difficulty swallowing liquids    Reflux    Ulcer    Visual impairment    Reading glasses     Surgical History:  Past Surgical History:   Procedure Laterality Date    Benign biopsy right  2013    FA          Colonoscopy      D & c      Endometrial ablation      Excision turbinate,submucous  2013    Procedure: ADENOIDECTOMY;  Surgeon: Mc Ayala MD;  Location: [facility]    Excision turbinate,submucous  2013    Procedure: ADENOIDECTOMY;  Surgeon: Mc Ayala MD;  Location: [facility]    Hc  section level i      1990    Other surgical history      CS, esphogel polypectomy x2    Other surgical history      liposuction    Removal adenoids,primary,12+ y/o  2013     Procedure: ENDOSCOPIC SUBMUCOUS RESECTION INFERIOR TURBINATES;  Surgeon: Mc Ayala MD;  Location: [facility]    Repair of nasal septum  08/14/2013    Procedure: SEPTOPLASTY NASAL;  Surgeon: Mc Ayala MD;  Location: [facility]       Allergies:  Allergies   Allergen Reactions    Latex RASH and OTHER (SEE COMMENTS)    Ceftin [Cefuroxime] RASH    Codeine     Oxycodone     Vicodin [Hydrocodone-Acetaminophen] OTHER (SEE COMMENTS)     Per patient allergy was noted 20 years ago, uncofirmed if true allergy     Clindamycin RASH    Penicillins RASH     Adult allergy - no hospitalization, no cardiac or organ damage       Palliative Care Social History:    Marital Status:  she is .  Ayad is her support person    Functional History:    ADLs: Independent.  Not working currently which frustrates her    Medications:  Current Outpatient Medications   Medication Sig Dispense Refill    HYDROcodone-acetaminophen 5-325 MG Oral Tab Take 1 tablet by mouth every 6 (six) hours as needed for Pain. 60 tablet 0    budeson-glycopyrrol-formoterol (BREZTRI AEROSPHERE) 160-9-4.8 MCG/ACT Inhalation Aerosol Inhale 2 puffs into the lungs 2 (two) times daily. 10.7 g 5    ipratropium-albuterol 0.5-2.5 (3) MG/3ML Inhalation Solution Take 3 mL by nebulization 4 (four) times daily. 360 mL 5    Respiratory Therapy Supplies (FULL KIT NEBULIZER SET) Does not apply Misc 1 kit As Directed. 1 each 0    buPROPion HCl ER, Smoking Det, 150 MG Oral Tablet 12 Hr 1 tablet (150 mg total) 2 (two) times daily.      TRAMADOL 50 MG Oral Tab 1 tab po bid prn pain 30 tablet 0    nicotine 14 MG/24HR Transdermal Patch 24 Hr PLACE 1 PATCH ONTO THE SKIN ONCE EVERY MORNING AS DIRECTED      pantoprazole 40 MG Oral Tab EC Take 1 tablet (40 mg total) by mouth 2 (two) times daily.      triamcinolone 0.1 % External Cream Apply 1 Application topically 2 (two) times daily. 45 g 0    PILOCARPINE 5 MG Oral Tab 2 tab po tid 180 tablet 3     gabapentin 300 MG Oral Cap Take 2 capsules (600 mg total) by mouth in the morning, at noon, and at bedtime. 180 capsule 1    famotidine 40 MG Oral Tab       ALPRAZOLAM 0.5 MG Oral Tab Take 1 tab po 30 minute before radiation daily 30 tablet 0    cyanocobalamin 1000 MCG Oral Tab Take 1 tablet (1,000 mcg total) by mouth daily.      MONTELUKAST 10 MG Oral Tab TAKE 1 TABLET(10 MG) BY MOUTH EVERY NIGHT 90 tablet 0    albuterol 108 (90 Base) MCG/ACT Inhalation Aero Soln Inhale 2 puffs into the lungs every 4 (four) hours as needed. 54 g 1    fluticasone propionate 50 MCG/ACT Nasal Suspension SHAKE LIQUID AND USE 2 SPRAYS IN EACH NOSTRIL DAILY 48 g 1    EPINEPHrine 0.3 MG/0.3ML Injection Solution Auto-injector Inject 0.3 mL (1 each total) as directed one time. Injectf into the muscle one time as needed for anaphylaxis for up to 2 doses         Review of Systems:  General:  Fatigue.  Feels well.    Respiratory:  Denies SOB, denies cough  Cardiac:  Denies chest pain, heart palpitations  Abdomen:  Denies constipation, diarrhea.  Denies pain.  Nausea today  Psych:  No complaints.  Sleeping well    Palliative Performance Scale:  100 %    Physical Examination:  General: Patient is alert and oriented, not in acute distress.  Skin:  intact  Respiratory:  clear  Cardiac:   No edema  Abdomen: Soft, non tender, + BS  Musculoskeletal: Normal gait.   Psych:  Mood/Affect appropriate    Advanced Directives Discussed and Completed:     HCPOA/Health Surrogate:      There is no completed HCPOA documentation on file in [facility].  Pain was focus today    Palliative Care:   The patient is frustrated about pain.  She doesn't like taking medications and weaned off opioids quickly post treatment.  Tramadol has not been effective.  She has tolerated norco in the past with benefit.    Will speak with oncology regarding her L facial/ear pain.  Suggested she try to move up her ENT appointment.    Discussed that if this pain is not related to cancer after  any workup ordered is completed, she will need to follow up with pain clinic for ongoing management.  Will provide a 2 week supply of norco for now and see how she responds.  She knows that this is short term if recurrent or metastatic cancer is not the cause.    Discussed that she is using too much acetaminophen and should limit total dose to no more than 4G total in a day    Impression/Plan:   Pain  Norco 5mg Q 6 prn   Acetaminophen 1G TID - not to exceed 4000mg daily      Planned Follow up: Return in about 2 weeks (around 10/21/2024).    I spent a total of 35 minutes with the patient today, which included all of the following:direct face to face contact, history taking, physical examination, and >50% was spent counseling and coordinating care        The 21st Century Cures Act makes medical notes like these available to patients in the interest of transparency. Please be advised this is a medical document. Medical documents are intended to carry relevant information, facts as evident, and the clinical opinion of the practitioner. The medical note is intended as peer to peer communication and may appear blunt or direct. It is written in medical language and may contain abbreviations or verbiage that are unfamiliar.        Electronically Signed by:  RAIN GALAVIZ Outpatient Palliative Nurse Practitioner

## 2024-10-10 NOTE — DISCHARGE INSTRUCTIONS
Home Care Instructions Following Bronchoscopy / Endobronchial Ultrasound with Sedation    Diet:  Prior to your examination, a local anesthetic was used to numb the back of your throat. Do not eat or drink for two hours. Your nurse will instruct you with the time you may resume your diet.  Sip fluids initially and advance to your regular diet as tolerated.  Do not drink alcohol today.    Medication:  If you have questions about resuming your normal medications, please contact your Primary Care Physician.    Activities:  Do not drive today.  Do not operate any machinery today (including kitchen equipment).    What to Expect:  A sore throat  A cough  Hoarseness  A small amount of blood in your sputum    Contact Your Doctor If:  You have difficulty breathing  You have chest pain  You have a fever greater than 102°F  You cough up more than a few tablespoons of blood in your sputum    **If unable to reach your doctor, please go to the [facility] Emergency Room**    - Your referring physician will receive a full report of your examination.  - Please contact your physician’s office within one week for results if appointment not scheduled.

## 2024-10-10 NOTE — ANESTHESIA PREPROCEDURE EVALUATION
PRE-OP EVALUATION    Patient Name: Jeannette Morales    Admit Diagnosis: Lung nodules [R91.8]  Thoracic lymphadenopathy [R59.0]    Pre-op Diagnosis: Lung nodules [R91.8]  Thoracic lymphadenopathy [R59.0]    ROBOT-ASSISTED NAVIGATIONAL BRONCHOSCOPY/RADIAL PROBE ENDOBRONCHIAL ULTRASOUND/FLUOROSCOPY/CRYOPROBE/CYTOLOGY/3D C ARM    Anesthesia Procedure: ROBOT-ASSISTED NAVIGATIONAL BRONCHOSCOPY/RADIAL PROBE ENDOBRONCHIAL ULTRASOUND/FLUOROSCOPY/CRYOPROBE/CYTOLOGY/3D C ARM    Surgeons and Role:     * Hany Brantley MD - Primary    Pre-op vitals reviewed.  Temp: 97.9 °F (36.6 °C)  Pulse: 85  Resp: 16  BP: 133/90  SpO2: 98 %  Body mass index is 21.77 kg/m².    Current medications reviewed.  Hospital Medications:   [COMPLETED] ipratropium-albuterol (Duoneb) 0.5-2.5 (3) MG/3ML inhalation solution 3 mL  3 mL Nebulization Once    lactated ringers infusion   Intravenous Continuous    ipratropium-albuterol (Duoneb) 0.5-2.5 (3) MG/3ML inhalation solution           Outpatient Medications:   Prescriptions Prior to Admission[1]    Allergies: Latex, Ceftin [cefuroxime], Codeine, Oxycodone, Vicodin [hydrocodone-acetaminophen], Clindamycin, and Penicillins      Anesthesia Evaluation    Patient summary reviewed.    Anesthetic Complications  (-) history of anesthetic complications         GI/Hepatic/Renal      (+) GERD and well controlled                          Cardiovascular      ECG reviewed.      MET: >4      (+) hypertension     (-) CAD  (-) past MI                              Endo/Other      (-) diabetes                            Pulmonary      (+) asthma                     Neuro/Psych                                      Past Surgical History:   Procedure Laterality Date    Benign biopsy right  2013    FA          Colonoscopy      D & c      Endometrial ablation      Excision turbinate,submucous  2013    Procedure: ADENOIDECTOMY;  Surgeon: Mc Ayala MD;  Location: [facility]     Excision turbinate,submucous  2013    Procedure: ADENOIDECTOMY;  Surgeon: Mc Ayala MD;  Location: [facility]    Hc  section level i      1990    Other surgical history      CS, esphogel polypectomy x2    Other surgical history      liposuction    Removal adenoids,primary,12+ y/o  2013    Procedure: ENDOSCOPIC SUBMUCOUS RESECTION INFERIOR TURBINATES;  Surgeon: Mc Ayala MD;  Location: [facility]    Repair of nasal septum  2013    Procedure: SEPTOPLASTY NASAL;  Surgeon: Mc Ayala MD;  Location: [facility]     Social History     Socioeconomic History    Marital status:     Number of children: 1   Occupational History    Occupation: [employer]     Comment: working 3 days per week   Tobacco Use    Smoking status: Every Day     Current packs/day: 0.50     Average packs/day: 0.5 packs/day for 50.8 years (25.4 ttl pk-yrs)     Types: Cigarettes     Start date:      Passive exposure: Past    Smokeless tobacco: Former     Types: Chew     Quit date: 2008   Vaping Use    Vaping status: Never Used   Substance and Sexual Activity    Alcohol use: Yes     Alcohol/week: 6.0 standard drinks of alcohol     Types: 6 Standard drinks or equivalent per week     Comment: 10/06/2023 - Patient no longer consumes alcohol    Drug use: Yes     Types: Cannabis     Comment: Cannabsis occoasioanlly     History   Drug Use    Types: Cannabis     Comment: Cannabsis occoasioanlly     Available pre-op labs reviewed.  Lab Results   Component Value Date    WBC 4.6 2024    RBC 3.75 (L) 2024    HGB 12.5 2024    HCT 36.1 2024    MCV 96.3 2024    MCH 33.3 2024    MCHC 34.6 2024    RDW 14.2 2024    .0 2024     Lab Results   Component Value Date     2024    K 3.7 2024     2024    CO2 27.0 2024    BUN 14 2024    CREATSERUM 0.85 2024     (H) 2024    CA  9.7 2024            Airway    Airway assessment appropriate for age.  Mallampati: III  Mouth opening: 3 FB  TM distance: 4 - 6 cm   Cardiovascular    Cardiovascular exam normal.         Dental             Pulmonary    Pulmonary exam normal.                 Other findings              ASA: 3   Plan: general  NPO status verified and     Post-procedure pain management plan discussed with surgeon and patient.      Plan/risks discussed with: patient                Present on Admission:  **None**             [1]   Medications Prior to Admission   Medication Sig Dispense Refill Last Dose/Taking    SYMBICORT 160-4.5 MCG/ACT Inhalation Aerosol Inhale 2 puffs into the lungs 2 (two) times daily. Rinse mouth after use. 1 each 5 10/10/2024    Tiotropium Bromide Monohydrate (SPIRIVA RESPIMAT) 2.5 MCG/ACT Inhalation Aero Soln Inhale 2 Inhalations into the lungs daily. 1 each 5 10/10/2024    HYDROcodone-acetaminophen 5-325 MG Oral Tab Take 1 tablet by mouth every 6 (six) hours as needed for Pain. 60 tablet 0 10/10/2024    ipratropium-albuterol 0.5-2.5 (3) MG/3ML Inhalation Solution Take 3 mL by nebulization 4 (four) times daily. 360 mL 5 Past Week    Respiratory Therapy Supplies (FULL KIT NEBULIZER SET) Does not apply Misc 1 kit As Directed. 1 each 0 Taking    buPROPion HCl ER, Smoking Det, 150 MG Oral Tablet 12 Hr 1 tablet (150 mg total) 2 (two) times daily.   10/7/2024    TRAMADOL 50 MG Oral Tab 1 tab po bid prn pain 30 tablet 0 Past Week    [] guaiFENesin-codeine 100-10 MG/5ML Oral Solution Take 10 mL by mouth 4 (four) times daily as needed.   Taking As Needed    nicotine 14 MG/24HR Transdermal Patch 24 Hr PLACE 1 PATCH ONTO THE SKIN ONCE EVERY MORNING AS DIRECTED   Taking    pantoprazole 40 MG Oral Tab EC Take 1 tablet (40 mg total) by mouth 2 (two) times daily.   10/9/2024    [] predniSONE 20 MG Oral Tab Take 2 tablets (40 mg total) by mouth daily for 5 days. 10 tablet 0 Taking    triamcinolone 0.1 %  External Cream Apply 1 Application topically 2 (two) times daily. 45 g 0 Past Month    PILOCARPINE 5 MG Oral Tab 2 tab po tid 180 tablet 3 10/10/2024    gabapentin 300 MG Oral Cap Take 2 capsules (600 mg total) by mouth in the morning, at noon, and at bedtime. 180 capsule 1 10/9/2024    famotidine 40 MG Oral Tab    10/9/2024    ALPRAZOLAM 0.5 MG Oral Tab Take 1 tab po 30 minute before radiation daily 30 tablet 0 10/10/2024    cyanocobalamin 1000 MCG Oral Tab Take 1 tablet (1,000 mcg total) by mouth daily.   Past Week    MONTELUKAST 10 MG Oral Tab TAKE 1 TABLET(10 MG) BY MOUTH EVERY NIGHT 90 tablet 0 10/10/2024    albuterol 108 (90 Base) MCG/ACT Inhalation Aero Soln Inhale 2 puffs into the lungs every 4 (four) hours as needed. 54 g 1 10/10/2024    fluticasone propionate 50 MCG/ACT Nasal Suspension SHAKE LIQUID AND USE 2 SPRAYS IN EACH NOSTRIL DAILY 48 g 1 10/9/2024    EPINEPHrine 0.3 MG/0.3ML Injection Solution Auto-injector Inject 0.3 mL (1 each total) as directed one time. Injectf into the muscle one time as needed for anaphylaxis for up to 2 doses   Taking

## 2024-10-10 NOTE — INTERVAL H&P NOTE
Pre-op Diagnosis: Lung nodules [R91.8]  Thoracic lymphadenopathy [R59.0]    The above referenced H&P was reviewed by Hany Brantley MD on 10/10/2024, the patient was examined and no significant changes have occurred in the patient's condition since the H&P was performed.  I discussed with the patient and/or legal representative the potential benefits, risks and side effects of this procedure; the likelihood of the patient achieving goals; and potential problems that might occur during recuperation.  I discussed reasonable alternatives to the procedure, including risks, benefits and side effects related to the alternatives and risks related to not receiving this procedure.  We will proceed with procedure as planned.

## 2024-10-10 NOTE — ANESTHESIA POSTPROCEDURE EVALUATION
[facility]    Jeannette Morales Patient Status:  Hospital Outpatient Surgery   Age/Gender 54 year old female MRN [id number]   Location [facility] ENDOSCOPY PAIN CENTER Attending Hany Brantley*   Hosp Day # 0 PCP Michael Yen MD       Anesthesia Post-op Note    ROBOT-ASSISTED NAVIGATIONAL BRONCHOSCOPY with transbronchial needle aspiration, cryo probe biopsies, cytology brushing, bronchial lavage  /RADIAL PROBE/FLUOROSCOPY/CRYOPROBE/CYTOLOGY/3D C ARM    Procedure Summary       Date: 10/10/24 Room / Location:  ENDOSCOPY 04 /  ENDOSCOPY    Anesthesia Start: 1419 Anesthesia Stop: 1546    Procedure: ROBOT-ASSISTED NAVIGATIONAL BRONCHOSCOPY with transbronchial needle aspiration, cryo probe biopsies, cytology brushing, bronchial lavage  /RADIAL PROBE/FLUOROSCOPY/CRYOPROBE/CYTOLOGY/3D C ARM Diagnosis:       Lung nodules      Thoracic lymphadenopathy      (Lung nodules [R91.8]Thoracic lymphadenopathy [R59.0])    Surgeons: Hany Brantley MD Anesthesiologist: Pina Wilson MD    Anesthesia Type: general ASA Status: 3            Anesthesia Type: general    Vitals Value Taken Time   /93 10/10/24 1546   Temp 98.7 10/10/24 1546   Pulse 95 10/10/24 1546   Resp 18 10/10/24 1546   SpO2 99 10/10/24 1546       Patient Location: PACU    Anesthesia Type: general    Airway Patency: patent    Postop Pain Control: adequate    Mental Status: preanesthetic baseline    Nausea/Vomiting: none    Cardiopulmonary/Hydration status: stable euvolemic    Complications: no apparent anesthesia related complications    Postop vital signs: stable    Dental Exam: Unchanged from Preop    Patient to be discharged from PACU when criteria met.

## 2024-10-10 NOTE — TELEPHONE ENCOUNTER
Responded to pt's [employer] message.  Breztri was given as a transistion supply.  Instructed pt that once this is used up, she will have to go to Symbicort - Spiriva regimen.  Pt verbalizes understanding.

## 2024-10-10 NOTE — OPERATIVE REPORT
[facility] Bronchoscopy Operative Report    Jeannette Morales Patient Status:  Hospital Outpatient Surgery    1970 MRN [id number]   Location [facility] ENDOSCOPY PAIN CENTER Attending Hany Brantley*   Hosp Day # 0 PCP Michael Yen MD     DATE OF OPERATION: 10/10/24    PREOPERATIVE DIAGNOSIS(ES):  lung nodules     POSTOPERATIVE DIAGNOSIS(ES): same     OPERATION(S) PERFORMED:   Robotic Navigational bronchoscopy (CPT 12481)  Fluoroscopy guided Transbronchial biopsy x 1 lobe _ (CPT 29044)  Transbronchial Needle Aspiration of lung nodule x 1 site _ (CPT 80658)  Transbronchial Brushings (CPT 82067)  Bronchoalveolar lavage (CPT 27607)  Radial (peripheral) endobronchial ultrasound (CPT 29108)     SURGEON: Hany Brantley MD    ANESTHESIA: General. Please see separate flow sheet.      EQUIPMENT:   Olympus 20 MHz radial probe, endobronchial ultrasound  Olympus adult therapeutic video bronchoscope  Robotic Ion navigation software with vision probe  GE 3D C-arm fluoroscope.   Standard brush  21 gauge Intuitive Flexision needle  ERBE 1.1mm cryoprobe and ERBE console    INDICATION: The patient is a 54 year old female who was found to have left lower lobe lung nodule.  The procedure is being undertaken for diagnostic purposes. Differential diagnosis, risks, benefits and alternatives were discussed at length. Alternatives such as mediastinoscopy and conventional transbronchial needle biopsy were discussed. Endobronchial ultrasound guided transbronchial needle aspiration is  superior to blind transbronchial needle aspiration and is the recommended approach for this indication.       CONSENT:  Risks and benefits were reviewed with the patient in detail regarding the procedure as well as anesthesia prior to the procedure. All questions were answered, and the patient was agreeable.     PROCEDURE:    Time out done prior to the start procedure.  The patient's CT scan and 3-dimensional DICOM format  was loaded onto the Intuitive Ion Planpoint software. Target point T1 in the left lower lobe was marked and measured at 9 mm. Virtual pathways were created to the lesion. This information was stored to a jump drive and loaded onto the Intuitive Ion controller software in the bronchoscopy suite.    Robotic navigation, airway evaluation and biopsies  The patient was placed in a supine position, anesthesia administered, ETT was placed. The flexible bronchoscope was inserted and airway inspection was performed down to the subsegmental levels. No endobronchial lesions were seen.  The scope was then withdrawn.   The robotic Ion catheter was introduced via the swivel adaptor in the ETT tube. Registration was performed and confirmed with acceptable divergence. Using shape sensing robotic catheter guidance, the left lower lobe lesion was located within the posterior lateral subsegment. Subsequently, radial probe ultrasound was introduced through the robotic catheter confirming appropriate positioning with  eccentric followed by concentric view.  Using additional fluoroscopic guidance, transbronchial needle aspirations  and transbronchial cryobiopsies were performed. Passes were given to the cytopathologist for screening with report of atypical cells.  Cytology brushings were performed.   A bronchoalveolar lavage was then performed in the left lower lobe lateral segment with the instillation of 20ml sterile saline and return of 5 ml which was sent for cytology.  There was no evidence of active bleeding. Robotic catheter was withdrawn.    EBUS not performed due to small ETT size    The patient tolerated the procedure well without immediate complications.     EBL:  <5mL     IMPRESSION: lung nodule     PLAN:   Patient to be transferred to PACU in stable condition, follow up XR indicated  Await results of bronchoscopy for further delineation of care     Hany Brantley MD  [facility] Pulmonary  Medicine  Office: [phone]

## 2024-10-10 NOTE — TELEPHONE ENCOUNTER
Received a fax from [facility] and [facility].  Breztri not covered.  A 90 day transition supply will be given.  Per Dr. Brantley, prescription for Symbicort and Spiriva sent to [facility].  My Chart message sent to pt.

## 2024-10-10 NOTE — ANESTHESIA PROCEDURE NOTES
Airway  Date/Time: 10/10/2024 2:29 PM  Urgency: elective      General Information and Staff    Patient location during procedure: OR  Anesthesiologist: Pina Wilson MD  Performed: anesthesiologist   Performed by: Pina Wilson MD  Authorized by: Pina Wilson MD      Indications and Patient Condition  Indications for airway management: anesthesia  Sedation level: deep  Preoxygenated: yes  Patient position: sniffing  Mask difficulty assessment: 1 - vent by mask    Final Airway Details  Final airway type: endotracheal airway      Successful airway: ETT  Cuffed: yes   Successful intubation technique: direct laryngoscopy  Facilitating devices/methods: intubating stylet  Endotracheal tube insertion site: oral  Blade: GlideScope    Cormack-Lehane Classification: grade IIA - partial view of glottis  Placement verified by: capnometry   Measured from: lips  ETT to lips (cm): 20  Number of attempts at approach: 2    Additional Comments  1st attempt with DL. Identified the epiglottic tumor. Did not attempt to pass the ETT. Made another attempt with glidescope with Dr. Nair present and discussed attempting to pass 7.5 ETT due to the small glottic opening. 7.5 ETT was passed with minimal resistance.

## 2024-10-11 NOTE — TELEPHONE ENCOUNTER
Called patient for update post-bronchoscopy from yesterday. She states no coughing, dyspnea or fevers she is just struggling with the head, neck and ear pain which was something she had prior and is scheduled for a Ct Neck 10/21 prior to her follow-up same day with Dr. Brantley.Advised to call if things worsen

## 2024-10-16 NOTE — TELEPHONE ENCOUNTER
TC to patient    Explained lung biopsy results  Surgical option per lung conference is to remove the whole lobe  Favor SBRT  Slow growing small no will discuss SBRT with her after reviewing CT neck ordered by Dr Louise later this month  Small peripheral lung tumor - no adjuvant systemic RX  Incidentally, airway was too narrow for endobronchial US    Liane Case MD

## 2024-10-17 NOTE — TELEPHONE ENCOUNTER
Les Machado,  I tried to call you but no answer.  You can call me if you have questions.    I spoke with ADÁN Louise and Dr. Case regarding your ongoing L sided neck and jaw pain.      This ongoing wax and wane pain can be normal sequela from the type of radiation you received.  It often can be a long term issue but there are things that can help.      I'm glad you have the CT scan on Monday - we will see what that shows.  If its negative for new tumor, then a long term plan can be put in place to help with this new pain you have.    I know you don't like taking opioids but, for now, increase the norco to 10mg every 4 hrs as needed.  You can take 2 tabs for now, if this controls your pain better, I will send in a new script for 10mg tablets.  If not, we can try something else.      Dr. Case also often refers to occupational therapy for a special type of massage to loosen up the damaged tight tissue from radiation.  This can be very beneficial and minimize your need to take pain medication.    There is a specific therapist - her name is Stephani.  If interested, an order can be placed.      His office will call you to arrange an appointment for follow up regarding this pain since its a result of past radiation.  He is usually the one that will manage this long term, if needed.

## 2024-10-21 NOTE — PROGRESS NOTES
[facility] Pulmonary Follow Up Note    Chief Complaint:  Chief Complaint   Patient presents with    Follow - Up     Bronch f/u        History of Present Illness:  Jeannette Morales is a 54 year old female who presents today for follow up of evaluation of lung nodule.  Patient had 7 mm nodule on PET scan back from 2024 and is now growing to 9 mm on last CT scan.  Patient denies any fevers, chills, night sweats, or weight loss.  Patient has smoked for 40+ years, cancer history as above.     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    Patient has been on Breztri with great improvement in breathing.  She notices a significant improvement in her breathing when she has been on it.      Past Medical History:   Past Medical History:    ALCOHOL USE    Allergic rhinitis    Anemia    Anxiety    Asthma (HCC)    BLOOD DISORDER    anemia    Esophageal reflux    Exposure to medical diagnostic radiation    Last treatment 2024    Extrinsic asthma, unspecified    Head and neck cancer (HCC)    High blood pressure    HTN (hypertension)    Hx of motion sickness    Irritable bowel syndrome    Migraines    Personal history of antineoplastic chemotherapy    Last treatment 2024    Pneumonia due to organism    PONV (postoperative nausea and vomiting)    \"violently ill\" at [facility] after Anesthesia; UI next month didn't have a problem, Anesthesia said was probably from the gas used at [place]; 2024: Woke up from anesthesia during last procedure    Problems with swallowing    5/3/23 pt states has been limited to soft foods d/t growth in throat; denies difficulty swallowing liquids    Reflux    Ulcer    Visual impairment    Reading glasses        Past Surgical History:   Past Surgical History:   Procedure Laterality Date    Benign biopsy right  2013    FA           Colonoscopy      D & c      Endometrial ablation      Excision turbinate,submucous  2013    Procedure: ADENOIDECTOMY;  Surgeon: Mc Ayala MD;  Location: [facility]    Excision turbinate,submucous  2013    Procedure: ADENOIDECTOMY;  Surgeon: Mc Ayala MD;  Location: [facility]    Hc  section level i      1990    Other surgical history      CS, esphogel polypectomy x2    Other surgical history      liposuction    Removal adenoids,primary,12+ y/o  2013    Procedure: ENDOSCOPIC SUBMUCOUS RESECTION INFERIOR TURBINATES;  Surgeon: Mc Ayala MD;  Location: [facility]    Repair of nasal septum  2013    Procedure: SEPTOPLASTY NASAL;  Surgeon: Mc Ayala MD;  Location: [facility]       Family Medical History:   Family History   Problem Relation Age of Onset    Cancer Father         Esophagus and lung cancer    Diabetes Father     Breast Cancer Paternal Grandmother 60        age 60's        Social History:   Social History     Socioeconomic History    Marital status:      Spouse name: Not on file    Number of children: 1    Years of education: Not on file    Highest education level: Not on file   Occupational History    Occupation: [employer]     Comment: working 3 days per week   Tobacco Use    Smoking status: Every Day     Current packs/day: 0.50     Average packs/day: 0.5 packs/day for 50.8 years (25.4 ttl pk-yrs)     Types: Cigarettes     Start date:      Passive exposure: Past    Smokeless tobacco: Former     Types: Chew     Quit date: 2008   Vaping Use    Vaping status: Never Used   Substance and Sexual Activity    Alcohol use: Yes     Alcohol/week: 6.0 standard drinks of alcohol     Types: 6 Standard drinks or equivalent per week     Comment: 10/06/2023 - Patient no longer consumes alcohol    Drug use: Yes     Types: Cannabis     Comment: Cannabsis occoasioanlly    Sexual activity: Not on file   Other Topics  Concern    Not on file   Social History Narrative    - lives alone    Has one grown son - lives in [place]      Social Drivers of Health     Financial Resource Strain: Not on file   Food Insecurity: Not on file   Transportation Needs: Not on file   Physical Activity: Not on file   Stress: Not on file   Social Connections: Unknown (6/1/2021)    [facility]    Social Connections     Conversations with friends/family/neighbors per week: Not on file   Housing Stability: At Risk (8/18/2023)    [facility]     Living Situation: Not on file     Housing Problems: Not on file        Medications:   Current Outpatient Medications   Medication Sig Dispense Refill    budeson-glycopyrrol-formoterol (BREZTRI AEROSPHERE) 160-9-4.8 MCG/ACT Inhalation Aerosol Inhale 2 puffs into the lungs 2 (two) times daily. 10.7 g 5    SYMBICORT 160-4.5 MCG/ACT Inhalation Aerosol Inhale 2 puffs into the lungs 2 (two) times daily. Rinse mouth after use. 1 each 5    Tiotropium Bromide Monohydrate (SPIRIVA RESPIMAT) 2.5 MCG/ACT Inhalation Aero Soln Inhale 2 Inhalations into the lungs daily. 1 each 5    HYDROcodone-acetaminophen 5-325 MG Oral Tab Take 1 tablet by mouth every 6 (six) hours as needed for Pain. 60 tablet 0    ipratropium-albuterol 0.5-2.5 (3) MG/3ML Inhalation Solution Take 3 mL by nebulization 4 (four) times daily. 360 mL 5    Respiratory Therapy Supplies (FULL KIT NEBULIZER SET) Does not apply Misc 1 kit As Directed. 1 each 0    buPROPion HCl ER, Smoking Det, 150 MG Oral Tablet 12 Hr 1 tablet (150 mg total) 2 (two) times daily.      TRAMADOL 50 MG Oral Tab 1 tab po bid prn pain 30 tablet 0    nicotine 14 MG/24HR Transdermal Patch 24 Hr PLACE 1 PATCH ONTO THE SKIN ONCE EVERY MORNING AS DIRECTED      pantoprazole 40 MG Oral Tab EC Take 1 tablet (40 mg total) by mouth 2 (two) times daily.      triamcinolone 0.1 % External  Cream Apply 1 Application topically 2 (two) times daily. 45 g 0    PILOCARPINE 5 MG Oral Tab 2 tab po tid 180 tablet 3    gabapentin 300 MG Oral Cap Take 2 capsules (600 mg total) by mouth in the morning, at noon, and at bedtime. 180 capsule 1    famotidine 40 MG Oral Tab       ALPRAZOLAM 0.5 MG Oral Tab Take 1 tab po 30 minute before radiation daily 30 tablet 0    cyanocobalamin 1000 MCG Oral Tab Take 1 tablet (1,000 mcg total) by mouth daily.      MONTELUKAST 10 MG Oral Tab TAKE 1 TABLET(10 MG) BY MOUTH EVERY NIGHT 90 tablet 0    albuterol 108 (90 Base) MCG/ACT Inhalation Aero Soln Inhale 2 puffs into the lungs every 4 (four) hours as needed. 54 g 1    fluticasone propionate 50 MCG/ACT Nasal Suspension SHAKE LIQUID AND USE 2 SPRAYS IN EACH NOSTRIL DAILY 48 g 1    EPINEPHrine 0.3 MG/0.3ML Injection Solution Auto-injector Inject 0.3 mL (1 each total) as directed one time. Injectf into the muscle one time as needed for anaphylaxis for up to 2 doses         Review of Systems: Review of Systems     Physical Exam:  /86 (BP Location: Right arm, Patient Position: Sitting, Cuff Size: adult)   Pulse 101   Resp 16   Ht 5' 2\" (1.575 m)   Wt 119 lb (54 kg)   SpO2 98%   BMI 21.77 kg/m²      Constitutional: alert, cooperative. No acute distress.  HEENT: Head NC/AT. Nares normal. Septum midline. Mucosa normal. No drainage or sinus tenderness.  Cardio: Regular rate and rhythm. Normal S1 and S2. No murmurs.   Respiratory: Thorax symmetrical with no labored breathing. clear to auscultation bilaterally  Extremities: No clubbing or cyanosis. No BLE edema.    Neurologic: A&Ox3. No gross motor deficits.  Skin: Warm, dry  Psych: Calm, cooperative. Pleasant affect.    Results:  Personally reviewed  XR CT SCAN FOLLOW UP STUDY (CPT=76380)  Narrative: PROCEDURE:  XR CT SCAN FOLLOW-UP STUDY (CPT=76380)     INDICATIONS:  CT-guided procedure     COMPARISON:  None.     TECHNIQUE:  Images were obtained for bronchoscopic CT-guided  procedure     FINDINGS:  These images were obtained for CT guided bronchoscopic procedure.  These have no additional diagnostic utility.                   Impression: CONCLUSION:  Correlation with findings during the procedure is necessary.        Dictated by (CST): Stew Mantilla MD on 10/10/2024 at 6:15 PM       Finalized by (CST): Stew Mantilla MD on 10/10/2024 at 6:16 PM     XR CHEST AP PORTABLE  (CPT=71045)  Narrative: PROCEDURE:  XR CHEST AP PORTABLE  (CPT=71045)     TECHNIQUE:  AP chest radiograph was obtained.     COMPARISON:  [facility] , XR, XR CHEST AP PORTABLE  (CPT=71045), 6/02/2021, 2:41 PM.     INDICATIONS:  Right chest port placement     PATIENT STATED HISTORY: (As transcribed by Technologist)  Patient offered no additional history at this time.         FINDINGS:  The cardiomediastinal silhouette is within normal limits.  Interval right chest port has been placed terminating in the superior vena cava.  There is no consolidation, effusion, or pneumothorax.  No aggressive osseous lesions are identified.                   Impression: CONCLUSION: No acute cardiopulmonary abnormality.  Interval right chest port placement.        LOCATION:  [facility]                 Dictated by (CST): Moisés García MD on 10/10/2024 at 5:17 PM       Finalized by (CST): Moisés García MD on 10/10/2024 at 5:18 PM     XR ENDOSCOPY - N/C  Narrative: PROCEDURE:  XR ENDOSCOPY - N/C     COMPARISON:  None.     INDICATIONS:  X-ray imaging assistance, and static imaging provided by the technologist in the OR for the purposes of operative planning during surgery.         HISTORY: (As transcribed by Technologist)  Patient offered no additional history at this time.       FLUORSCOPY IMAGES OBTAINED:  1  FLUOROSCOPY TIME:  7 minutes 15 seconds  TECHNOLOGIST TIME:  1 hour  RADIATION DOSE (AIR KERMA PRODUCT):  31.5 mGy                    Impression: CONCLUSION:      Single image shows cope over the left lower chest.     Images obtained for the  purposes of surgical planning. Correlation should also be made with real-time imaging as viewed during the surgical procedure. If additional diagnostic imaging needed, consider followup with standard imaging exams.        LOCATION:  [id number]        Dictated by (CST): Darrell Houston MD on 10/10/2024 at 4:12 PM       Finalized by (CST): Darrell Houston MD on 10/10/2024 at 4:12 PM     CT CHEST BRONCH NAVIGATION PRE OP LESS THAN 6 WEEKS (CPT=76497)  Narrative: PROCEDURE:  CT CHEST BRONCH NAVIGATION PRE-OP <6 WEEKS (CPT=76497)     COMPARISON:  [facility] , CT, CT CHEST (CPT=71250), 9/04/2024, 7:57 AM.     INDICATIONS:  R91.1 Incidental lung nodule, greater than or equal to 8mm     TECHNIQUE:  Unenhanced multislice CT scanning is performed through the chest.  Additional imaging was acquired for navigational bronchoscopy.  Dose reduction techniques were used. Dose information is transmitted to the ACR (American College of Radiology)   NRDR (National Radiology Data Registry) which includes the Dose Index Registry.     PATIENT STATED HISTORY: (As transcribed by Technologist)  lung nodule, larger than 8mm         FINDINGS:    LUNGS:  No focal consolidation.  Re-identified is a left lower lobe 9 mm nodule.  Mild centrilobular emphysematous changes predominantly involve the upper lobes.  Biapical pleural parenchymal scarring.  VASCULATURE:  Thrombus cannot be excluded without intravenous contrast.    MARILIN:  No mass or adenopathy.    MEDIASTINUM:  No mass or adenopathy.    CARDIAC:  No enlargement or pericardial thickening.  No coronary artery calcifications.  PLEURA:  No mass or effusion.    THORACIC AORTA:  Unremarkable as seen on non-contrast imaging.   CHEST WALL:  No mass or axillary adenopathy.  Right Port-A-Cath.  LIMITED ABDOMEN:  Limited images of the upper abdomen are unremarkable.    BONES:  Stable including upper to mid thoracic degenerative change.  No fracture.                     Impression: CONCLUSION:    1. Stable  findings when compared to 9/4/2024 CT chest including left lower lobe 9 mm nodule.        LOCATION:  [facility]        Dictated by (CST): Vanessa Lomas MD on 10/10/2024 at 2:32 PM       Finalized by (CST): Vanessa Lomas MD on 10/10/2024 at 2:36 PM         PFTs:       No data to display                   No data to display                    WBC: 4.6, done on 9/11/2024.  HGB: 12.5, done on 9/11/2024.  PLT: 181, done on 9/11/2024.     Glucose: 109, done on 9/11/2024.  Cr: 0.85, done on 9/11/2024.  Last eGFR was 103 on 10/21/2024.  CA: 9.7, done on 9/11/2024.  Na: 136, done on 9/11/2024.  K: 3.7, done on 9/11/2024.  Cl: 107, done on 9/11/2024.  CO2: 27, done on 9/11/2024.  Last ALB was 4.5% done on 9/11/2024.     XR CT SCAN FOLLOW UP STUDY (CPT=76380)    Result Date: 10/10/2024  CONCLUSION:  Correlation with findings during the procedure is necessary.   Dictated by (CST): Stew Mantilla MD on 10/10/2024 at 6:15 PM     Finalized by (CST): Stew Mantilla MD on 10/10/2024 at 6:16 PM       XR CHEST AP PORTABLE  (CPT=71045)    Result Date: 10/10/2024  CONCLUSION: No acute cardiopulmonary abnormality.  Interval right chest port placement.   LOCATION:  [id number]      Dictated by (CST): Moisés García MD on 10/10/2024 at 5:17 PM     Finalized by (CST): Moisés García MD on 10/10/2024 at 5:18 PM       XR ENDOSCOPY - N/C    Result Date: 10/10/2024  CONCLUSION:  Single image shows cope over the left lower chest.  Images obtained for the purposes of surgical planning. Correlation should also be made with real-time imaging as viewed during the surgical procedure. If additional diagnostic imaging needed, consider followup with standard imaging exams.   LOCATION:  [id number]   Dictated by (CST): Darrell Houston MD on 10/10/2024 at 4:12 PM     Finalized by (CST): Darrell Houston MD on 10/10/2024 at 4:12 PM       CT CHEST BRONCH NAVIGATION PRE OP LESS THAN 6 WEEKS (CPT=76497)    Result Date: 10/10/2024  CONCLUSION:  1. Stable findings when compared to  9/4/2024 CT chest including left lower lobe 9 mm nodule.   LOCATION:  [facility]   Dictated by (CST): Vanessa Lomas MD on 10/10/2024 at 2:32 PM     Finalized by (CST): Vanessa Lomas MD on 10/10/2024 at 2:36 PM       CT CHEST (KIQ=59778)    Result Date: 9/4/2024  CONCLUSION:  The nodule at the left lung base has increased in size and now measures up to 9 mm compared with the prior PET/CT of 3/1/2024 where it measured up to 7 mm.  This is concerning for an area of developing malignancy and may be better characterized with follow-up PET/CT or tissue sampling.  LOCATION:  [facility]   Dictated by (CST): Mac Musa MD on 9/04/2024 at 9:54 AM     Finalized by (CST): Mac Musa MD on 9/04/2024 at 10:00 AM         Assessment/Plan:  #1.  Non-small cell lung cancer with lipidic features  Discussed case with multidisciplinary conference   Will plan for radiation therapy  Dr. Case to assist in this    #2.  COPD  We will try to complete prior Auth for Breztri as she is getting excellent benefit from it significantly better from this were split      Return in about 3 months (around 1/21/2025).    I spent 35 minutes obtaining and reviewing records, preparing for the visit including reviewing chart and prior testing, face to face time examining the patient and obtaining history, counseling, arranging and reviewing office-based testing, independently reviewing relevant studies and documentation exclusive of other billable procedures.      Hany Brantley MD  10/21/2024

## 2024-10-22 NOTE — TELEPHONE ENCOUNTER
Spoke with patient. Dr. Louise would like patient to see [facility] for ENT. Patient verbalized understanding.

## 2024-10-22 NOTE — TELEPHONE ENCOUNTER
Jeannette [phone]  I need to schedule my radiation and I haven't heard from anyone.   They  can call my number or my boyfriend's number. Would like a return call. Thanks Phyllis

## 2024-10-22 NOTE — TELEPHONE ENCOUNTER
Jeannette [phone] Would like to know if you want her to see ENT at [facility] or go to [facility] .  She has an appointment 11/19/2024  Or do you want  me to come in before than and see someone here.   Please advise. Thanks Phyllis

## 2024-10-23 NOTE — TELEPHONE ENCOUNTER
Spoke with patient regarding her pain.  She continues to complain of constant intense deep achy stabbing pain of the L jaw, neck and ear pain.  This pain is affecting her ability to eat and its affecting her ability to fall asleep and will wake her at night.    She is frustrated with pain.  She is using tylenol and norco 5mg ATC without much benefit.  She continues gabapentin 300mg TID.  Her PCP decreased her to 100mg but she hasn't started that dose yet.    She is seeing RT on Monday as this is most likely sequela from previous RT.  She also will receive RT to new lung nodule which is causing no pain.  She has follow up on 11/19 with ENT to evaluate if pain is due to recurrence or just scar tissue.      Discussed pain control options until follow up with Rad onc and ENT.      Will have continue gabapentin and increase HS dose.  She will now use 300/300/600mg to help with neuropathic component of pain.    Will increase norco since current norco is only providing slight benefit.

## 2024-10-28 NOTE — PROGRESS NOTES
SW called and spoke with patient to provide psycho social support and resources due to distress screen 10. Patient has a limited support system and she has financial insecurities Patient  states she is receiving SSDI but is is not enough to cover her living expenses. Patient is also receiving SNAP but it is not enough and she doesn't have enough food. SW will assist patient with connecting to available resources in the community such as local food pantries and cancer support agencies. SW will search for available resources for financial assistance, counseling services . SW will contact patient tomorrow and provide her with said resources.

## 2024-10-28 NOTE — PATIENT INSTRUCTIONS
- WE WILL CALL TO SCHEDULE YOUR FOLLOW UP APPOINTMENT WITH DR BULLOCK IN JANUARY 2025        - CALL [phone] IF YOU HAVE ANY QUESTIONS/CONCERNS REGARDING RADIATION THERAPY

## 2024-10-28 NOTE — PROGRESS NOTES
Nursing Follow-Up Note    Patient: Jeannette Morales  YOB: 1970  Age: 54 year old  Radiation Oncologist: Dr. Liane Case  Referring Physician: No ref. provider found  Chief Complaint:   Chief Complaint   Patient presents with    Follow - Up     Date: 10/28/2024    Toxicities: neck pain    Vital Signs: BP (!) 139/98 (BP Location: Left arm, Patient Position: Sitting, Cuff Size: adult)   Pulse 107   Temp 99.1 °F (37.3 °C) (Tympanic)   Resp 18   Wt 56 kg (123 lb 6.4 oz)   SpO2 95%   BMI 22.57 kg/m² ,   Wt Readings from Last 6 Encounters:   10/28/24 56 kg (123 lb 6.4 oz)   10/21/24 54 kg (119 lb)   10/10/24 54 kg (119 lb)   10/07/24 52.6 kg (116 lb)   09/20/24 54 kg (119 lb)   09/11/24 54.2 kg (119 lb 6.4 oz)       Allergies:  Allergies[1]    Nursing Note: Hx of laryngeal SCCa p16+. Completed chemoRT with cisplatin 12/7/23. Now having ongoing L neck pain. CT neck done 10/21/24. Had flexible larygoscopy done 8/27/24- showed diffuse supraglottic swelling. Had CT chest bronch done 10/10/24. L lower lobe nodule biopsy done- path adenocarcinoma with lepedic growth pattern. Here for follow up today. Pt c/o vomiting past couple weeks. Pt reports she's vomiting when she's constipated or stressed. Is using compazine without relief. Pt using Norco 10/325 Q3-4 hours. Pain currently 7/10. Last [place] dose was 8am. Has appt with ENT at [facility] on Nov 19.          [1]   Allergies  Allergen Reactions    Latex RASH and OTHER (SEE COMMENTS)    Ceftin [Cefuroxime] RASH    Codeine     Oxycodone     Vicodin [Hydrocodone-Acetaminophen] OTHER (SEE COMMENTS)     Per patient allergy was noted 20 years ago, uncofirmed if true allergy     Clindamycin RASH    Penicillins RASH     Adult allergy - no hospitalization, no cardiac or organ damage

## 2024-10-28 NOTE — PROGRESS NOTES
Palliative Care Follow Up Note     Patient Name: Jeannette Morales   YOB: 1970   Medical Record Number: [id number]   CSN: [id number]   Date of visit: 10/28/2024     Chief Complaint/Reason for Visit:  Pain follow up     History of Present Illness:         Jeannette Morales is a 54 year old female with larynx cancer who completed chemo/RT.    She will now receive RT for a new lung cancer nodule.   She had a recent CT scan to evaluate her L jaw, face, ear pain.  She is very anxious about this being her cancer returning.  She has follow up with ENT mid November to further evaluate this pain and CT results.  She is has been feeling very anxious and stressed about ongoing hoarse voice, pain and financial hardships due to her cancer and lingering symptoms.   She is xanax dependent which is managed by her PCP.  She is here today complaining of ongoing L ear, jaw,neck,shoulder pain.  This pain is deep achy, constant with periodic stabbing pain.  It varies in intensity.  She is using norco 10mg every 4 hrs while awake.  This increased dose has provided a slight benefit but is causing N/V.  She is sleeping through the night.    She continues to struggle with taste but is eating and drinking.   Her hoarse and dry mouth bother her and affect her ability to eat.   She met with rad onc today to discuss RT for lung nodule and ongoing issues with throat due to prior RT.   She feels pain is affecting her QOL but doesn't want to take pain medication unless needed.                 Problem List:  Patient Active Problem List   Diagnosis    Asthma (HCC)    Deviated nasal septum    GERD (gastroesophageal reflux disease)    Tobacco abuse    Fibroadenoma    Verruca    Sinusitis    Knee contusion    Esophageal spasm    Iron deficiency anemia due to chronic blood loss    Sinusitis, acute    Subacute pansinusitis    Iron deficiency    Mass of breast, right    Breast tenderness    Chronic bronchitis (HCC)    Persistent cough     Fatigue, unspecified type    Menorrhagia with regular cycle    Anxiety    Epiglottic lesion    Seasonal allergies    Sore throat    Head and neck cancer (HCC)    Malignant neoplasm of overlapping sites of larynx (HCC)    Palliative care by specialist    HTN (hypertension)    Nausea      Medical History:  Past Medical History:    ALCOHOL USE    Allergic rhinitis    Anemia    Anxiety    Asthma (HCC)    BLOOD DISORDER    anemia    Esophageal reflux    Exposure to medical diagnostic radiation    Last treatment 2024    Extrinsic asthma, unspecified    Head and neck cancer (HCC)    High blood pressure    HTN (hypertension)    Hx of motion sickness    Irritable bowel syndrome    Migraines    Personal history of antineoplastic chemotherapy    Last treatment 2024    Pneumonia due to organism    PONV (postoperative nausea and vomiting)    \"violently ill\" at [facility] after Anesthesia; UI next month didn't have a problem, Anesthesia said was probably from the gas used at [place]; 2024: Woke up from anesthesia during last procedure    Problems with swallowing    5/3/23 pt states has been limited to soft foods d/t growth in throat; denies difficulty swallowing liquids    Reflux    Ulcer    Visual impairment    Reading glasses     Surgical History:  Past Surgical History:   Procedure Laterality Date    Benign biopsy right  2013    FA          Colonoscopy      D & c      Endometrial ablation      Excision turbinate,submucous  2013    Procedure: ADENOIDECTOMY;  Surgeon: Mc Ayala MD;  Location: [facility]    Excision turbinate,submucous  2013    Procedure: ADENOIDECTOMY;  Surgeon: Mc Ayala MD;  Location: [facility]    Hc  section level i          Other surgical history      CS, esphogel polypectomy x2    Other surgical history      liposuction    Removal adenoids,primary,12+ y/o  2013    Procedure: ENDOSCOPIC SUBMUCOUS  RESECTION INFERIOR TURBINATES;  Surgeon: Mc Ayala MD;  Location: [facility]    Repair of nasal septum  08/14/2013    Procedure: SEPTOPLASTY NASAL;  Surgeon: Mc Ayala MD;  Location: [facility]       Allergies:  Allergies   Allergen Reactions    Latex RASH and OTHER (SEE COMMENTS)    Ceftin [Cefuroxime] RASH    Codeine     Oxycodone     Vicodin [Hydrocodone-Acetaminophen] OTHER (SEE COMMENTS)     Per patient allergy was noted 20 years ago, uncofirmed if true allergy     Clindamycin RASH    Penicillins RASH     Adult allergy - no hospitalization, no cardiac or organ damage       Palliative Care Social History:    Marital Status:  she is .  Ayad is her support person    Functional History:    ADLs: Independent.  Not working currently which frustrates her    Medications:  Current Outpatient Medications   Medication Sig Dispense Refill    HYDROmorphone 2 MG Oral Tab Take 1 tablet (2 mg total) by mouth every 4 (four) hours as needed for Pain. 60 tablet 0    gabapentin 300 MG Oral Cap Take 1 capsule (300 mg total) by mouth 2 (two) times a day AND 2 capsules (600 mg total) nightly. 180 capsule 1    HYDROcodone-acetaminophen  MG Oral Tab Take 1 tablet by mouth every 4 (four) hours as needed for Pain. 90 tablet 0    budeson-glycopyrrol-formoterol (BREZTRI AEROSPHERE) 160-9-4.8 MCG/ACT Inhalation Aerosol Inhale 2 puffs into the lungs 2 (two) times daily. 10.7 g 5    SYMBICORT 160-4.5 MCG/ACT Inhalation Aerosol Inhale 2 puffs into the lungs 2 (two) times daily. Rinse mouth after use. 1 each 5    Tiotropium Bromide Monohydrate (SPIRIVA RESPIMAT) 2.5 MCG/ACT Inhalation Aero Soln Inhale 2 Inhalations into the lungs daily. (Patient not taking: Reported on 10/28/2024) 1 each 5    ipratropium-albuterol 0.5-2.5 (3) MG/3ML Inhalation Solution Take 3 mL by nebulization 4 (four) times daily. 360 mL 5    Respiratory Therapy Supplies (FULL KIT NEBULIZER SET) Does not apply Misc 1 kit As  Directed. 1 each 0    buPROPion HCl ER, Smoking Det, 150 MG Oral Tablet 12 Hr 1 tablet (150 mg total) 2 (two) times daily. (Patient not taking: Reported on 10/28/2024)      nicotine 14 MG/24HR Transdermal Patch 24 Hr PLACE 1 PATCH ONTO THE SKIN ONCE EVERY MORNING AS DIRECTED (Patient not taking: Reported on 10/28/2024)      pantoprazole 40 MG Oral Tab EC Take 1 tablet (40 mg total) by mouth 2 (two) times daily. (Patient not taking: Reported on 10/28/2024)      triamcinolone 0.1 % External Cream Apply 1 Application topically 2 (two) times daily. (Patient not taking: Reported on 10/28/2024) 45 g 0    PILOCARPINE 5 MG Oral Tab 2 tab po tid 180 tablet 3    famotidine 40 MG Oral Tab       ALPRAZOLAM 0.5 MG Oral Tab Take 1 tab po 30 minute before radiation daily 30 tablet 0    cyanocobalamin 1000 MCG Oral Tab Take 1 tablet (1,000 mcg total) by mouth daily.      MONTELUKAST 10 MG Oral Tab TAKE 1 TABLET(10 MG) BY MOUTH EVERY NIGHT 90 tablet 0    albuterol 108 (90 Base) MCG/ACT Inhalation Aero Soln Inhale 2 puffs into the lungs every 4 (four) hours as needed. 54 g 1    fluticasone propionate 50 MCG/ACT Nasal Suspension SHAKE LIQUID AND USE 2 SPRAYS IN EACH NOSTRIL DAILY 48 g 1    EPINEPHrine 0.3 MG/0.3ML Injection Solution Auto-injector Inject 0.3 mL (1 each total) as directed one time. Injectf into the muscle one time as needed for anaphylaxis for up to 2 doses         Review of Systems:  General:  Fatigue.  Feels well.    Respiratory:  Denies SOB, denies cough  Cardiac:  Denies chest pain, heart palpitations  Abdomen:  Denies constipation, diarrhea.  Denies pain.  Nausea today  Psych:  No complaints.  Sleeping well    Palliative Performance Scale:  100 %    Physical Examination:  General: Patient is alert and oriented, not in acute distress.  Skin:  intact  Respiratory:  clear  Cardiac:   No edema  Abdomen: Soft, non tender, + BS  Musculoskeletal: Normal gait.   Psych:  Mood/Affect appropriate    Advanced Directives  Discussed and Completed:     HCPOA/Health Surrogate:      There is no completed HCPOA documentation on file in [facility].  Pain was focus today    Palliative Care:   The patient is frustrated about pain.  Higher dose of norco is still optimizing pain control and causing nausea/vomiting.    Will change to dilaudid which she tolerated previously and was effective for pain.  She can alternate with acetaminophen if needed.   She doesn't like taking medications and weaned off opioids quickly in the past.  Will continue this until after her ENT visit.    She is going to try massage with OT for post RT treatment changes to see if this helps with scar tissue and pain.  If pain persists without new evidence of cancer recurrence. Can consider other non opioid options or pain clinic to minimize need for chronic opioid which patient also doesn't want.      RT already placed referral to social work to help with resources and regarding anxiety.  She can follow up with PCP if medications need to be adjusted      Impression/Plan:   Pain  Dilaudid 2mg Q 4 prn  Acetaminophen 1G TID prn  Gabapentin 300/330/600mg    Anxiety  Xanax - PCP managing   for resources  Relaxation techniques    Dry mouth  Biotene  Gargles  RT managing      Planned Follow up: Return in about 1 month (around 11/28/2024).    I spent a total of 30 minutes with the patient today, which included all of the following:direct face to face contact, history taking, physical examination, and >50% was spent counseling and coordinating care        The 21st Century Cures Act makes medical notes like these available to patients in the interest of transparency. Please be advised this is a medical document. Medical documents are intended to carry relevant information, facts as evident, and the clinical opinion of the practitioner. The medical note is intended as peer to peer communication and may appear blunt or direct. It is written in medical language and may contain  abbreviations or verbiage that are unfamiliar.        Electronically Signed by:  RAIN GALAVIZ Outpatient Palliative Nurse Practitioner

## 2024-10-28 NOTE — PROGRESS NOTES
[facility]  RADIATION ONCOLOGY   FOLLOW UP     Jeannette Morales  4/20/1970    DIAGNOSIS: SGL SCCA      CANCER HISTORY   -sore throat, dysphagia, hoarseness, right neck and ear pain  -some delay to dx; DL EUA biopsy at [facility] 9/11/23:  centered in epiglottis, +R AEF, abut BOT, neg cords, neg HPX  -PET:  Oct 2023 - SUV 20 at primary site, SUV 3-5 bilateral small cervical LAD  -RT with cisplatin (70 Gy in 35 fx, completed 12/7/2023)    Dr Angely Floyd ENT [facility]: Aug 2024  Had upper esophageal dilation by Dr Floyd with improvement  Had dilation of distal esophagus in the past for GERD by GI  CT chest 9/4/24L  9 mm LLL nodule, was PET negative in 3/2024 when it was 7 mm; no LAD, probably stable vs CT chest in 9/2023 (Jeannette Andrew)    INTERIM HISTORY   Here for follow up  Voice still very hoarse  Now with 2-3 mo of persistent left neck pain  Does not sound referred from C spine  Swallowing ok and eating ok - weight is stable  On gabapentin and norco for pain from PCS  Seeing DDS  CT neck 10/2024 - negative for LAD; fullness in SGL/HPX    Had biopsy of LLL nodule by pulm on 10/10/2024 by Dr Brantley  Path shows adenocarcinoma with lepidic growth, TTF-1 positive    EXAM   123#  Weight is up a bit  Neck is woody and stiff, some edema; no LAD or masses, but difficult exam  Skin healed - a little dry right low neck  No trismus  Hoarse    IMPRESSION/PLAN   10 mo out from RT/cisplatin    -Stage I lepidic growth adenocarcinoma left lower lung without adenopathy or metastatic disease.  EBUS could not be done because of narrow airway.  Would not be indicated anyway because nodes are PET negative and the primary is small and peripheral.  Considered surgical resection versus SBRT.  Would be difficult to identify the lesion for removal in her airway/lung function is not optimal.  Favor SBRT as per lung conference.  She is agreeable to this and will proceed with CT simulation in 2 weeks.  I plan to give 5 fraction SBRT.    Neck pain  is difficult to assess.  Could be postradiation fibrosis/edema.  Will refer for physical therapy.  Will continue medications per palliative care service, a combination of gabapentin and hydrocodone.  I do not think a course of steroids would be of particular value.  Her neck exam is not easy, we will get a CT neck with contrast in 3 months.  She does need laryngoscopy by her ENT at [facility] next month.    Follow-up after CT scan in 3 months.  SBRT left lung next month.    Liane Case MD  Radiation Oncology  [email or web address]  [phone]    CC: MD MICHELLE Stahl MD    20 minutes were spent with the patient/family, more than 50 percent on counseling/coordination of care (discuss disease status, management of any side effects, future follow up plans)

## 2024-10-30 NOTE — PROGRESS NOTES
A list of community and cancer support resources, letter for boyfriend's work and emotional support animal also copy of [facility]  has all been mailed out to patient today per her request.

## 2024-11-05 NOTE — TELEPHONE ENCOUNTER
Margaret,Insurance Tech at [facility] is calling regarding a medication request for Hydromorphone 2mg. Margaret would like to know if we're requesting 360 tablets per 30 days. The case id#[id number]     Please call back [phone] option 4    Margaret stated she is also sending a fax to right fax in case you can't get in contact with them

## 2024-11-06 NOTE — TELEPHONE ENCOUNTER
Prior authorization attempted for Breztri.  Case # [id number]. Was denied.  Patient must try and fail 5 approved drugs/ Patient would need to try Incruse, Advair diskus and Atrovent.

## 2024-11-13 NOTE — PROGRESS NOTES
Nursing Re- Consult Note    Patient: Jeannette Morales  YOB: 1970  Age: 54 year old  Radiation Oncologist:Liane Case MD  Referring Physician: Dr. Louise  Diagnosis:No diagnosis found.  Consult Date: 11/13/2024    History of Present Illness:  Pt here for re-consult regarding L lung nodule. Completed chemo with RT to larynx on 12/7/23. Had PET/CT in March 2024, showed 7mm micro nodule in left lung base. Monitored, had CT of chest on 9/4/24 which showed increased size of nodule. Recommended bronch biopsy, done on 10/10/24. Path showed LLL nodule + for adenoca. Followed up with Dr. Louise, ordered PET/CT (today). Referred back for RT re-consult. Pt here with August pickering. Increased fatigue. C/O constant pain to L side of neck, radiating to back of head, jaw, shoulders and to R side of neck. Denies sore throat and dysphagia. Appetite fair, has intermittent nausea. Taking anti-emetics PRN, also taking Dilaudid 1-2 tabs q4-5 hours PRN for pain. No constipation. Has good ROM to neck, does lymphedema massages PRN. Cont to have hoarse voice. Still with white thick sputum production. No c/o increased SOB/dyspnea. Cont to use inhalers with relief. C/O intermittent HA, no visual changes.     Vital Signs: BP (!) 128/92 (BP Location: Left arm, Patient Position: Sitting, Cuff Size: adult)   Pulse 98   Temp 98.4 °F (36.9 °C) (Tympanic)   Resp 20   Wt 55.2 kg (121 lb 12.8 oz)   SpO2 98%   BMI 22.28 kg/m²     Wt Readings from Last 6 Encounters:   11/13/24 55.2 kg (121 lb 12.8 oz)   10/28/24 56 kg (123 lb 6.4 oz)   10/28/24 54.7 kg (120 lb 9.6 oz)   10/21/24 54 kg (119 lb)   10/10/24 54 kg (119 lb)   10/07/24 52.6 kg (116 lb)       ROS: Review of Systems   Constitutional:  Positive for malaise/fatigue.   HENT: Negative.     Eyes: Negative.    Respiratory:  Positive for cough and shortness of breath.    Cardiovascular: Negative.    Gastrointestinal:  Positive for nausea.   Genitourinary: Negative.     Musculoskeletal:  Positive for neck pain.   Skin: Negative.    Neurological:  Positive for headaches.   Endo/Heme/Allergies: Negative.    Psychiatric/Behavioral: Negative.         Medications and Allergies review by RN: yes

## 2024-11-13 NOTE — PROGRESS NOTES
[facility]  RADIATION ONCOLOGY   FOLLOW UP     Jeannette Morales  4/20/1970    DIAGNOSIS: SGL SCCA      CANCER HISTORY   -sore throat, dysphagia, hoarseness, right neck and ear pain  -some delay to dx; DL EUA biopsy at [facility] 9/11/23:  centered in epiglottis, +R AEF, abut BOT, neg cords, neg HPX  -PET:  Oct 2023 - SUV 20 at primary site, SUV 3-5 bilateral small cervical LAD  -RT with cisplatin (70 Gy in 35 fx, completed 12/7/2023)    Dr Angely Floyd ENT [facility]: Aug 2024  Had upper esophageal dilation by Dr Floyd with improvement  Had dilation of distal esophagus in the past for GERD by GI  CT chest 9/4/24L  9 mm LLL nodule, was PET negative in 3/2024 when it was 7 mm; no LAD, probably stable vs CT chest in 9/2023 (Jeannette Morales)    INTERIM HISTORY   Here for follow up    Recently got engaged, congrats  Still smoking though  Hoarseness is stable  Has an appointment to begin PT for neck fibrosis postradiation  Swallowing okay, weight stable  Posttreatment pain managed by PCS  Here to undergo CT simulation for SBRT to a biopsy-proven LLL lung adenocarcinoma with lepidic growth    Had a PET scan this morning, not read yet    EXAM   121#  Well appearing, NAD  Stable stiffness/edema of the neck posttreatment    IMPRESSION/PLAN   11 mo out from RT/cisplatin to the head and neck    -LUCERO  -PT for neck fibrosis  -Review PET scan    Stage I lepidic growth adenocarcinoma left lower lung without adenopathy or metastatic disease.  EBUS could not be done because of narrow airway.  Would not be indicated anyway because nodes are PET negative and the primary is small and peripheral.  Considered surgical resection versus SBRT.  Would be difficult to identify the lesion for removal in her airway/lung function is not optimal.  Favor SBRT as per lung conference.     -CT simulation today  -5 fraction SBRT LLL    Follow-up after CT scan in 3 months.  SBRT L lung    Liane Case MD  Radiation Oncology  [email or web address]    30 minutes were  spent with the patient/family, more than 50 percent on counseling/coordination of care (discuss disease status, management of any side effects, future follow up plans)

## 2024-11-20 NOTE — PROGRESS NOTES
SPINE EVALUATION:   Referring Physician: Dr. Case  Diagnosis: Myofascial Adhesions to face and neck following radiation therapy for laryngeal cancer     Date of Service: 11/20/2024     PATIENT SUMMARY   Jeannette Morales is a 54 year old y/o female who presents to therapy today with history of larynx cancer who completed chemo/RT.   She is here today complaining of ongoing L ear, jaw,neck,shoulder pain.  This pain is deep achy, constant with periodic stabbing pain.  It varies in intensity.  She is using norco 10mg every 4 hrs while awake.  She continues to struggle with taste but is eating and drinking.   Her hoarse and dry mouth bother her and affect her ability to eat.   She met with rad onc today to discuss RT for lung nodule and ongoing issues with throat due to prior RT.  She states that she is limited most with cervical motions and with increased headache symptoms with sustained posturing and looking downward.  She states that there is an increase in TMJ symptoms with vomiting.  She denies TMJ pain with eating or talking.  The patient is an out of work .      The patient's symptoms are increased with sustained posturing and lessened with rest and ice.    Pain: current 7/10, best 5/10, worst 7/10  Current functional limitations include difficulty with chewing, .   Jeannette describes prior level of function as unlimited. Pt goals include to have less reactivity and increased mobility/functionality of the head and neck.  Past medical history was reviewed with Jeannette. Significant findings include  has a past medical history of ALCOHOL USE, Allergic rhinitis, Anemia, Anxiety (12/27/2017), Asthma (HCC), BLOOD DISORDER, Esophageal reflux, Exposure to medical diagnostic radiation, Extrinsic asthma, unspecified, Head and neck cancer (HCC) (10/04/2023), High blood pressure, HTN (hypertension) (09/11/2023), motion sickness, Irritable bowel syndrome, Migraines, Personal history of antineoplastic chemotherapy,  Pneumonia due to organism, PONV (postoperative nausea and vomiting), Problems with swallowing, Reflux, Ulcer, and Visual impairment.    Patient denies bowel/bladder changes, saddle paraesthesia, diplopia, dysarthria, dysphasia, and dizziness.      ASSESSMENT  The patient presents with the signs and symptoms of the Rehab Diagnosis of face and neck adhesions following radiation therapy supported by the clinical findings of decreased jaw AROM/PROM, decreased functional mobility of the head and neck, and decreased soft tissue mobility with elevated reactivity to palpation  Jeannette would benefit from skilled Physical Therapy to address the above impairments to increase soft tissue and joint mobility.    Precautions:  Cancer  OBJECTIVE:   Observation/Posture: The patient presents with a structural assessment of an increase in thoracic kyphosis and cervical lordosis.    Gait: Normal  Neuro Screen: The patient was assessed for MMT, sensation, and reflexes per all myotomes and dermatomes and presents with symmetrical findings right vs left    Cervical AROM:   Flexion: 75% normal motion   Extension: 50% normal motion  Sidebending: R 50% normal motion; L 50% normal motion  Rotation: R 50% normal motion; L 25% normal motion   *denotes pain when testing    Accessory motion: The patient presents with decreased motion at the segments OA and AA for posterior glide and right sided sidebending    Palpation: Increased tone of the bilateral paraspinal musculature    Strength:   UE/Scapular   Mid trap: R 3/5; L 3/5  Low trap: R 3/5; L 3/5       Flexibility: The patient presents with moderate restrictions of the upper trapezius, scalenes, and SCOM.      Special tests: Negative classical special tests of the cervical spine and Ues.      Today’s Treatment and Response:  Patient education provided on 11/20/2024 regarding the examination findings, stage of condition, and subjective/tissue reactivity.   Patient was instructed in and issued a HEP  for postural awareness and deep neck flexor training.  The patient demonstrated safe technique with exercise completion and vocalized understanding of informational material provided.    Charges: PT Eval moderate Complexity, TherEx 1      Total Timed Treatment: 40 min     Total Treatment Time: 40 min     In agreement with NDI Disability Index score and clinical rationale, this evaluation involved Moderate Complexity decision making due to 3+ personal factors/comorbidities.    PLAN OF CARE:    Goals (8 visits):    1. Improve upon NDI assessment > 11% from INE to DC.  2. Patient will be aware of postural limitations and be able to correct them independently.    3. Patient will have an increase in spinal mobility to >75% in order to return to more normalized function and exercise.    4. Patient will have an increase in core strength to assist with returning to sustained posturing and looking downward.  5. Patient will demonstrate an increase in MLT of the anterior thorax and cervical spine in order to return to more normalized cervical and orofacial function.     Frequency / Duration: Patient will be seen for 2 x/week or a total of 12 visits over a 90 day period. Treatment will include: Manual Therapy; Therapeutic Exercises; Neuromuscular Re-education; Therapeutic Activity; Electrical Stim; Mechanical Traction; Pt education; Home exercise program instruction.      Education or treatment limitation: None  Rehab Potential:fair    Neck Disability Index Score  Score: (Patient-Rptd) 74 % (11/20/2024  6:47 AM)    Patient/Family/Caregiver was advised of these findings, precautions, and treatment options and has agreed to actively participate in planning and for this course of care.    Thank you for your referral. Please co-sign or sign and return this letter via fax as soon as possible to [phone]. If you have any questions, please contact me at Dept: [phone]    Sincerely,  Electronically signed by therapist: Gary JULIEN  Daina, PT, DPT, FAAOMPT    Physician's certification required: Yes  I certify the need for these services furnished under this plan of treatment and while under my care.    X___________________________________________________ Date____________________    Certification From: 11/20/2024  To:2/18/2025

## 2024-11-27 NOTE — TELEPHONE ENCOUNTER
TC with Jeannette about the R lung finding on recent PET and lung tumor board rec to SBRT the R lung lesion along with the biopsy proven L lung lesion.    Also will add CLT to PT for neck edema/fibrosis per her request.

## 2024-12-09 NOTE — PROGRESS NOTES
met with patient who requested letter of diagnosis for SS Office. Letter completed, reviewed, and provided to Patient.     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 She is actively undergoing Radiation treatments, resulting in pain, fatigue, loss of endurance, skin irritation, and loss of voice. She is unable to work due to her disease and treatment, causing her financial burden.

## 2024-12-11 NOTE — PROGRESS NOTES
[facility] Radiation Treatment Management Note 1-5    Patient:  Jeannette Morales  Age:  54 year old  Visit Diagnosis:    1. Primary cancer of left lower lobe of lung (HCC)      Primary Rad/Onc:  Dr. Liane Case    Site Delivered Dose (cGy) Prescribed Dose (cGy) Fraction #   SBRT LLL 3000 5000 3/5   SBRT RML 3000 5000 3/5     First treatment date:   12/2/24  Concurrent chemotherapy:  N/A        10/28/2024    11:48 AM 11/13/2024    10:03 AM 12/11/2024    11:34 AM   Oncology Vitals   Weight 120 lb 9.6 oz 121 lb 12.8 oz 122 lb 9.6 oz   Weight 54.704 kg 55.248 kg 55.611 kg   BSA (m2) 1.54 m2 1.55 m2 1.55 m2   BMI 22.06 kg/m2 22.28 kg/m2 22.42 kg/m2   /80 128/92 110/78   Pulse 106 98 94   Resp 18 20 20   Temp 97.6 °F (36.4 °C) 98.4 °F (36.9 °C) 99.2 °F (37.3 °C)   SpO2 97 % 98 % 96 %   Pain Score 7 8 8        Toxicities:  Fatigue Grade 1= Fatigue relieved by rest  Dermatitis associated with radiation Grade 0= None  Moisturizer used Vanicream Number of times: 1 times daily.  Dysphagia Grade 1=  Symptomatic, able to eat regular diet  Cough Grade 1= Mild symptoms; nonprescription intervention indicated  Dyspnea Grade 1= Shortness of breath with moderate exertion     Nursing Note:  Pt seen pre-tx, will be at tx #4:  Tolerating SBRT well.  Cont to have neck and bilat ear pain.  Taking Dilaudid 1 tab q6 hours with Motrin in between PRN.  Denies increased dyspnea or SOB.    Loni BURNS RN MD NOTE   Reviewed and agree with RN note above.  Setup imaging reviewed in [facility] and approved.    S:  -doing ok with SBRT LLL and RML    O:  -neck fibrosis is stable    A/P:  -cont SBRT both lungs  -CT neck chest abd 3-4/2025  F/u after CT  -pain management per PCS; might need pain referral; has some degree of post RT chronic pain to bilateral neck due to fibrosis    Liane Case MD  Radiation Oncology

## 2024-12-11 NOTE — PATIENT INSTRUCTIONS
- WE WILL CALL TO SCHEDULE YOU FOR A FOLLOW-UP WITH .  - FOLLOW-UP WITH   - SIDE EFFECTS OF RADIATION WILL GRADUALLY SUBSIDE. IT MAY TAKE 1- 2 WEEKS POST-RADIATION FOR YOU TO NOTICE CHANGES SUCH AS A DECREASE IN YOUR FATIGUE LEVEL.   - CONTINUE WITH SKIN CARE: APPLY MILD CREAM TO TREATED AREA 2-3X/DAY, DO NOT USE HOT/COLD PACKS DIRECTLY ON SITE, USE MILD SOAP/DEODORANT TO AFFECTED AREA, KEEP OPEN TO AIR WHEN AT HOME.   - CALL THE NURSE LINE AT [phone] IF YOU HAVE ANY QUESTIONS/CONCERNS REGARDING RADIATION THERAPY.

## 2024-12-13 NOTE — TELEPHONE ENCOUNTER
Patient's partner called to inform that patient's car won't start and cannot make radiation treatment today. He states the patient was also taking a Covid test. Called 12/13/24 KM    Writer called nurses twice and left a voicemail to inform them.

## 2024-12-13 NOTE — TELEPHONE ENCOUNTER
Pt called stated she has to push back her appt today with Maira bc she feel like she is getting sick and has been up since 4 this morning, but pt is coughing, running nose all clear liquids and body aches     Pt has a rad appt, but is going to get a covid test and if it's neg she is planning on coming to her appt and would like an appt after rad with Maira     Please give the pt a call back

## 2024-12-13 NOTE — TELEPHONE ENCOUNTER
Called Ayad back, patient having coughing, runny nose-clear and body aches, she needs to push back her apt with Maira today to after RT if possible if she comes as she is going to test for Covid and if negative come for RT and to see Maira.    Requesting a call back from Maira regarding apt if possible.

## 2024-12-13 NOTE — PROGRESS NOTES
A telehealth video visit was performed today with patient while she was in her home.  She verbalized agreement to convert her in-person visit to telehealth.      Palliative Care Follow Up Note     Patient Name: Jeannette Morales   YOB: 1970   Medical Record Number: [id number]   [facility]: [id number]   Date of visit: 12/13/2024     Chief Complaint/Reason for Visit:  Pain follow up     History of Present Illness:         Jeannette Morales is a 54 year old female with larynx cancer who completed chemo/RT a year ago.  She is currently receiving RT for lung cancer.   She complains of cold symptoms and fever today.  She is following with her PCP for these symptoms.   She continues to complain of bilateral shoulder, neck,jaw and ear pain that is constant and varies in intensity.  The pain is deep achy sharp pain.  She had been off all pain meds until a few months ago when pain and her hoarse voice worsened.   She is undergoing lymphedema OT and PT to neck area.  She feels this is helping.  She feels her voice quality is improving also.  She currently is using dilaudid 2mg TID alternating with ibuprofen 400mg.  She will add tylenol if needed.  She feels gabapentin has been helpful.  She is using compazine 1-2X daily to help with nausea caused by dilaudid.  She is relieved that pain is finally controlled allowing her to function and sleep.  She is happy with plan.                   Problem List:  Patient Active Problem List   Diagnosis    Asthma (HCC)    Deviated nasal septum    GERD (gastroesophageal reflux disease)    Tobacco abuse    Fibroadenoma    Verruca    Sinusitis    Knee contusion    Esophageal spasm    Iron deficiency anemia due to chronic blood loss    Sinusitis, acute    Subacute pansinusitis    Iron deficiency    Mass of breast, right    Breast tenderness    Chronic bronchitis (HCC)    Persistent cough    Fatigue, unspecified type    Menorrhagia with regular cycle    Anxiety    Epiglottic lesion     Seasonal allergies    Sore throat    Head and neck cancer (HCC)    Malignant neoplasm of overlapping sites of larynx (HCC)    Palliative care by specialist    HTN (hypertension)    Nausea      Medical History:  Past Medical History:    ALCOHOL USE    Allergic rhinitis    Anemia    Anxiety    Asthma (HCC)    BLOOD DISORDER    anemia    Esophageal reflux    Exposure to medical diagnostic radiation    Last treatment 2024    Extrinsic asthma, unspecified    Head and neck cancer (HCC)    High blood pressure    HTN (hypertension)    Hx of motion sickness    Irritable bowel syndrome    Migraines    Personal history of antineoplastic chemotherapy    Last treatment 2024    Pneumonia due to organism    PONV (postoperative nausea and vomiting)    \"violently ill\" at [facility] after Anesthesia; UI next month didn't have a problem, Anesthesia said was probably from the gas used at [place]; 2024: Woke up from anesthesia during last procedure    Problems with swallowing    5/3/23 pt states has been limited to soft foods d/t growth in throat; denies difficulty swallowing liquids    Reflux    Ulcer    Visual impairment    Reading glasses     Surgical History:  Past Surgical History:   Procedure Laterality Date    Benign biopsy right  2013    FA          Colonoscopy      D & c      Endometrial ablation      Excision turbinate,submucous  2013    Procedure: ADENOIDECTOMY;  Surgeon: Mc Ayala MD;  Location: [facility]    Excision turbinate,submucous  2013    Procedure: ADENOIDECTOMY;  Surgeon: Mc Ayala MD;  Location: [facility]    Hc  section level i          Other surgical history      CS, esphogel polypectomy x2    Other surgical history      liposuction    Removal adenoids,primary,12+ y/o  2013    Procedure: ENDOSCOPIC SUBMUCOUS RESECTION INFERIOR TURBINATES;  Surgeon: Mc Ayala MD;  Location: [facility]     Repair of nasal septum  08/14/2013    Procedure: SEPTOPLASTY NASAL;  Surgeon: Mc Ayala MD;  Location: [facility], [facility]       Allergies:  Allergies   Allergen Reactions    Latex RASH and OTHER (SEE COMMENTS)    Ceftin [Cefuroxime] RASH    Codeine     Oxycodone     Vicodin [Hydrocodone-Acetaminophen] OTHER (SEE COMMENTS)     Per patient allergy was noted 20 years ago, uncofirmed if true allergy     Clindamycin RASH    Penicillins RASH     Adult allergy - no hospitalization, no cardiac or organ damage       Palliative Care Social History:    Marital Status:  she is engaged to [employer] who is very supportive    Functional History:    ADLs: Independent.  Not working currently which frustrates her    Medications:  Current Outpatient Medications   Medication Sig Dispense Refill    HYDROmorphone 2 MG Oral Tab Take 1 tablet (2 mg total) by mouth 3 (three) times daily as needed for Pain. 90 tablet 0    prochlorperazine (COMPAZINE) 10 mg tablet Take 1 tablet (10 mg total) by mouth every 6 (six) hours as needed for Nausea. 30 tablet 1    ondansetron (ZOFRAN) 4 mg tablet Take 1 tablet (4 mg total) by mouth every 8 (eight) hours as needed for Nausea. (Patient not taking: Reported on 11/13/2024) 30 tablet 0    gabapentin 300 MG Oral Cap Take 1 capsule (300 mg total) by mouth 2 (two) times a day AND 2 capsules (600 mg total) nightly. 180 capsule 1    budeson-glycopyrrol-formoterol (BREZTRI AEROSPHERE) 160-9-4.8 MCG/ACT Inhalation Aerosol Inhale 2 puffs into the lungs 2 (two) times daily. 10.7 g 5    SYMBICORT 160-4.5 MCG/ACT Inhalation Aerosol Inhale 2 puffs into the lungs 2 (two) times daily. Rinse mouth after use. 1 each 5    Tiotropium Bromide Monohydrate (SPIRIVA RESPIMAT) 2.5 MCG/ACT Inhalation Aero Soln Inhale 2 Inhalations into the lungs daily. 1 each 5    ipratropium-albuterol 0.5-2.5 (3) MG/3ML Inhalation Solution Take 3 mL by nebulization 4 (four) times daily. 360 mL 5    Respiratory Therapy Supplies (FULL KIT  NEBULIZER SET) Does not apply Misc 1 kit As Directed. 1 each 0    buPROPion HCl ER, Smoking Det, 150 MG Oral Tablet 12 Hr 1 tablet (150 mg total) 2 (two) times daily. (Patient not taking: Reported on 11/13/2024)      nicotine 14 MG/24HR Transdermal Patch 24 Hr PLACE 1 PATCH ONTO THE SKIN ONCE EVERY MORNING AS DIRECTED (Patient not taking: Reported on 10/28/2024)      pantoprazole 40 MG Oral Tab EC Take 1 tablet (40 mg total) by mouth 2 (two) times daily. (Patient not taking: Reported on 10/28/2024)      triamcinolone 0.1 % External Cream Apply 1 Application topically 2 (two) times daily. (Patient not taking: Reported on 11/13/2024) 45 g 0    PILOCARPINE 5 MG Oral Tab 2 tab po tid (Patient not taking: Reported on 11/13/2024) 180 tablet 3    famotidine 40 MG Oral Tab       ALPRAZOLAM 0.5 MG Oral Tab Take 1 tab po 30 minute before radiation daily 30 tablet 0    cyanocobalamin 1000 MCG Oral Tab Take 1 tablet (1,000 mcg total) by mouth daily. (Patient not taking: Reported on 11/13/2024)      MONTELUKAST 10 MG Oral Tab TAKE 1 TABLET(10 MG) BY MOUTH EVERY NIGHT 90 tablet 0    albuterol 108 (90 Base) MCG/ACT Inhalation Aero Soln Inhale 2 puffs into the lungs every 4 (four) hours as needed. 54 g 1    fluticasone propionate 50 MCG/ACT Nasal Suspension SHAKE LIQUID AND USE 2 SPRAYS IN EACH NOSTRIL DAILY (Patient not taking: Reported on 11/13/2024) 48 g 1    EPINEPHrine 0.3 MG/0.3ML Injection Solution Auto-injector Inject 0.3 mL (1 each total) as directed one time. Injectf into the muscle one time as needed for anaphylaxis for up to 2 doses (Patient not taking: Reported on 11/13/2024)         Review of Systems:  General:  Fatigue.  Feels well.    Respiratory:  Denies SOB, denies cough  Cardiac:  Denies chest pain, heart palpitations  Abdomen:  Denies constipation, diarrhea.  Denies pain.  Nausea today  Psych:  No complaints.  Sleeping well    Palliative Performance Scale:  100 %    Physical Examination:  General: Patient is  alert and oriented, not in acute distress.  HEENT: no edema or redness noted  Skin:  intact  Psych:  Mood/Affect appropriate    Advanced Directives Discussed and Completed:     HCPOA/Health Surrogate:      There is no completed HCPOA documentation on file in [facility].  Pain was focus today    Palliative Care:   Patient feels her QOL has improved and is happy with current plan.  She is highly motivated to get off dilaudid again if pain control can be maintained.  She doesn't feel good on dilaudid but \"its needed right now\".    Spoke with Dr. Case, this will most likely be a long term chronic issue due to previous RT to area.    She continues to work with PT/OT, she will see dentist, is open to seeing pain clinic for ongoing chronic pain management.  She is highly motivated to get off dilaudid if pain can remain tolerable.    She is very sensitive to opioids, mainly due to severe nausea, and tolerates dilaudid with the least SE.     She doesn't like taking medications and weaned off opioids quickly in the past.    She is hoping to return to work as things improve.    Her pain is sequela from past cancer treatment and most likely will be chronic.    Discussed transitioning care to pain clinic for ongoing chronic pain management to maximize her QOL.    She is open to this.  A referral was placed for .       Impression/Plan:   Chronic Pain after cancer treatment  Dilaudid 2mg Q 8 prn  Acetaminophen 1G QID prn  Ibuprofen 400mg Q 6 prn  Gabapentin 300/300/600mg  PT/OT to neck area  Pain clinic referral      Planned Follow up: Return in about 3 months (around 3/13/2025).      I spent a total of 30 minutes with the patient today, which included all of the following:direct face to face contact, history taking, physical examination, and >50% was spent counseling and coordinating care        The 21st Century Cures Act makes medical notes like these available to patients in the interest of transparency. Please be advised this  is a medical document. Medical documents are intended to carry relevant information, facts as evident, and the clinical opinion of the practitioner. The medical note is intended as peer to peer communication and may appear blunt or direct. It is written in medical language and may contain abbreviations or verbiage that are unfamiliar.        Electronically Signed by:  RAIN GALAVIZ Outpatient Palliative Nurse Practitioner

## 2024-12-17 NOTE — PROGRESS NOTES
Dx: Head and Neck pain following cancer dx         Authorized # of Visits:  6         Next MD visit: none scheduled  Fall Risk: standard         Precautions: n/a           Goal Number: 12    Subjective: States that the neck pain and ear pain is high.  Feels that the exercises have helped with her throat and face inflammaiton,  Rates pain a 7/10 per VAS.      Objective: Treatment initiated per treatment log.      Date:12/17/2024 TX#: 2/6 Date:               TX#: 3/   Date:               TX#: 4/ Date:               TX#: 5/ Date:               TX#: 6/ Date:               TX#: 7/ Date:               TX#: 8/   VAS 7/10 VAS /10 VAS /10 VAS /10 VAS /10 VAS /10 VAS /10   TherEx (10 Min)         Nods supine x 10         Resisted rotation isometrically x 10                                                      Manual PT (40 Min)         Manual PT to upper trapezius and levator scapulae; DTM and sustained stretch.                               Assessment: The patient responded well to today's intervention.  Was able to replicate HEP issued during initial exam.        Goals (8 visits):    1. Improve upon NDI assessment > 11% from INE to DC.  2. Patient will be aware of postural limitations and be able to correct them independently.    3. Patient will have an increase in spinal mobility to >75% in order to return to more normalized function and exercise.    4. Patient will have an increase in core strength to assist with returning to sustained posturing and looking downward.  5. Patient will demonstrate an increase in MLT of the anterior thorax and cervical spine in order to return to more normalized cervical and orofacial function.     Plan: Assess response to today's treatment and progress as tolerated.      Charges: TherEx 1 (10 min); Manual PT 3 (40 min)      Total Timed Treatment: 50 min  Total Treatment Time: 50 min

## 2024-12-18 NOTE — PROGRESS NOTES
[facility] RADIATION ONCOLOGY  TREATMENT SUMMARY    PATIENT:  Jeannette Morales    REFERRING MD: KAYLEEN Louise MD  DIAGNOSIS:  Hx of larynx cancer and new biopsy proven left lung cancer with presumed malignant R lung lesion    HISTORY:  53 yo with smoking hx. S/p chemo-RT in 12/2023 for laryngeal cancer.  On follow up imaging, found to have a growing 1 cm LLL mass.  Biopsy showed adenocarcinoma.  EBUS did not show jessica disease.  PET showed an incidental R lung lesion less than 1 cm in size that showed high SUV.  The imaging was reviewed in lung conference and felt to be highly suspicious for either a synchronous primary lung cancer or a metastasis from laryngeal or left lung cancer. The first thought was felt to be most likely.    She therefore underwent SBRT to both lesions, as she is not a good surgical candidate.    DOSE DELIVERED:    LLL Lung mass   5000 cGy in 5 fractions   6 MV photons   SBRT   12/2/2024 to 12/17/2024     RML Lung mass   5000 cGy in 5 fractions   6 MV photons   SBRT   12/2/2024 to 12/17/2024     SBRT tolerated well without issue    PLAN:  CT n/ch/abd 4/2025  F/u 4/2025  F/u PT, ENT, PCS, med onc    Liane Case M.D.  Radiation Oncology

## 2024-12-18 NOTE — TELEPHONE ENCOUNTER
Jeannette called and asked if Dr Case can refill her pilocarpine. She only has enough medication for today. She called Keagan and was told she does not have anymore refills.     I told Jeannette I would forward her request to Dr Case now.

## 2024-12-20 NOTE — PROGRESS NOTES
Dx: Head and Neck pain following cancer dx         Authorized # of Visits:  6         Next MD visit: none scheduled  Fall Risk: standard         Precautions: n/a           Goal Number: 12    Subjective: States that the neck pain and ear pain is high.  Feels that the exercises have helped with her throat and face inflammaiton,  Rates pain a 7/10 per VAS.      Objective: Treatment progressed per treatment log.      Date:12/17/2024 TX#: 2/6 Date:12/20/2024  TX#: 3/   Date:               TX#: 4/ Date:               TX#: 5/ Date:               TX#: 6/ Date:               TX#: 7/ Date:               TX#: 8/   VAS 7/10 VAS /10 VAS /10 VAS /10 VAS /10 VAS /10 VAS /10   TherEx (10 Min) TherEx (10 Min)        Nods supine x 10 Nods supine x 10        Resisted rotation isometrically x 10 Resisted rotation isometrically x 10                                                     Manual PT (40 Min) Manual PT (40 Min)        Manual PT to upper trapezius and levator scapulae; DTM and sustained stretch.   Manual PT to upper trapezius and levator scapulae; DTM and sustained stretch.                             Assessment: The patient responded well to today's intervention.  Improved myofascial mobility in general across the cervicothoracic spine.        Goals (8 visits):    1. Improve upon NDI assessment > 11% from INE to DC.  2. Patient will be aware of postural limitations and be able to correct them independently.    3. Patient will have an increase in spinal mobility to >75% in order to return to more normalized function and exercise.    4. Patient will have an increase in core strength to assist with returning to sustained posturing and looking downward.  5. Patient will demonstrate an increase in MLT of the anterior thorax and cervical spine in order to return to more normalized cervical and orofacial function.     Plan: Assess response to today's treatment and progress as tolerated.      Charges: TherEx 1 (10 min); Manual PT 3  (40 min)      Total Timed Treatment: 50 min  Total Treatment Time: 50 min

## 2024-12-30 NOTE — TELEPHONE ENCOUNTER
Pt last seen by Dr. Brantley.  Pt only needs tubing and mouthpiece.  Verbal order given to Keagan.

## 2024-12-31 NOTE — PROGRESS NOTES
Dx: Head and Neck pain following cancer dx         Authorized # of Visits:  6         Next MD visit: none scheduled  Fall Risk: standard         Precautions: n/a           Goal Number: 12    Subjective: States that the neck pain and ear pain has lessened somewhat.  States that she has been sick for the past week and has had an increase in TMJ pain with coughing.    Objective: Treatment progressed per treatment log.      Date:12/17/2024 TX#: 2/6 Date:12/20/2024  TX#: 3/   Date: 12/31/2024  TX#: 4/ Date:               TX#: 5/ Date:               TX#: 6/ Date:               TX#: 7/ Date:               TX#: 8/   VAS 7/10 VAS /10 VAS 5/10 VAS /10 VAS /10 VAS /10 VAS /10   TherEx (10 Min) TherEx (10 Min) TherEx (10 Min)       Nods supine x 10 Nods supine x 10 Nods supine x 10       Resisted rotation isometrically x 10 Resisted rotation isometrically x 10 Resisted rotation isometrically x 10                                                    Manual PT (40 Min) Manual PT (40 Min) Manual PT (40 Min)       Manual PT to upper trapezius and levator scapulae; DTM and sustained stretch.   Manual PT to upper trapezius and levator scapulae; DTM and sustained stretch.  Manual PT to upper trapezius and levator scapulae; DTM and sustained stretch.                           Assessment: The patient responded well to today's intervention. Cervicothoracic motion is improved over last session.          Goals (8 visits):    1. Improve upon NDI assessment > 11% from INE to DC.  2. Patient will be aware of postural limitations and be able to correct them independently.    3. Patient will have an increase in spinal mobility to >75% in order to return to more normalized function and exercise.    4. Patient will have an increase in core strength to assist with returning to sustained posturing and looking downward.  5. Patient will demonstrate an increase in MLT of the anterior thorax and cervical spine in order to return to more normalized  cervical and orofacial function.     Plan: Assess response to today's treatment and progress as tolerated.      Charges: TherEx 1 (10 min); Manual PT 3 (40 min)      Total Timed Treatment: 50 min  Total Treatment Time: 50 min

## 2025-01-06 NOTE — TELEPHONE ENCOUNTER
Pt called and asked to speak to Maira Cavazos. Stated she can't get an appt for the pain clinic until Jan 16th and the pt doesn't have enough medication to last until then. Pt stated she was advised to contact Maira     Please give the pt a call back

## 2025-01-15 NOTE — PROGRESS NOTES
Dx: Head and Neck pain following cancer dx         Authorized # of Visits:  6         Next MD visit: none scheduled  Fall Risk: standard         Precautions: n/a           Goal Number: 12    Subjective: States that the neck pain and ear pain and headaches     Objective: Treatment progressed per treatment log.      Date:12/17/2024 TX#: 2/6 Date:12/20/2024  TX#: 3/   Date: 12/31/2024  TX#: 4/ Date:1/15/2025  TX#: 5/ Date:               TX#: 6/ Date:               TX#: 7/ Date:               TX#: 8/   VAS 7/10 VAS /10 VAS 5/10 VAS /10 VAS /10 VAS /10 VAS /10   TherEx (10 Min) TherEx (10 Min) TherEx (10 Min) TherEx (10 Min)      Nods supine x 10 Nods supine x 10 Nods supine x 10 Nods supine x 10      Resisted rotation isometrically x 10 Resisted rotation isometrically x 10 Resisted rotation isometrically x 10 Resisted rotation isometrically x 10         Levator Scapulae Stretch 30 sec x 3                                          Manual PT (40 Min) Manual PT (40 Min) Manual PT (40 Min) Manual PT (40 Min)      Manual PT to upper trapezius and levator scapulae; DTM and sustained stretch.   Manual PT to upper trapezius and levator scapulae; DTM and sustained stretch.  Manual PT to upper trapezius and levator scapulae; DTM and sustained stretch. Manual PT to upper trapezius and levator scapulae; DTM and sustained stretch.                          Assessment: The patient responded well to today's intervention. OA mobility has improved overall.  Some tightness across with MLT bilateral levator scapulae.  Levator scapulae stretch to HEP.        Goals (8 visits):    1. Improve upon NDI assessment > 11% from INE to DC.  2. Patient will be aware of postural limitations and be able to correct them independently.    3. Patient will have an increase in spinal mobility to >75% in order to return to more normalized function and exercise.    4. Patient will have an increase in core strength to assist with returning to sustained  posturing and looking downward.  5. Patient will demonstrate an increase in MLT of the anterior thorax and cervical spine in order to return to more normalized cervical and orofacial function.     Plan: Assess response to today's treatment and progress as tolerated.      Charges: TherEx 1 (10 min); Manual PT 3 (40 min)      Total Timed Treatment: 50 min  Total Treatment Time: 50 min

## 2025-01-16 NOTE — PROGRESS NOTES
Patient: Jeannette Morales  Medical Record Number: [id number]  Site: [facility]  Referring Physician: Maira Cavazos  PCP: Dr. Yen    Dear Dr. Cavazos:    Thank you very much for requesting this consultation. I had the opportunity to evaluate and initiate care for your patient today, as per your request.    HISTORY OF CHIEF COMPLAINT:      Jeannette Morales is a 54 year old female, who complains of diffuse pain from shoulders through her head and face.    Patient is here today at the request of heme onc with pain in above described distrubution.  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  She completed chemo in 12/2023, though has continued with radiation treatments.  She is using dilaudid 2 mg TID which is causing nausea and vomiting.  To combat this, she was given prochlorperazine up to 3/day.  Last month, she had reported that this was fairly effective and stable, and was sent here for management.  Unfortunately, since that time, she has gone downhill, and has been using more dilaudid than prescribed.      VAS Pain Score:  8-10/10    Aggravating Factors: Relieving Factors:   Nothing specific  Nothing specific      Past Treatment Attempted/Patient’s Response:  As above     Past Medical History:    ALCOHOL USE    Allergic rhinitis    Anemia    Anxiety    Asthma (HCC)    BLOOD DISORDER    anemia    Esophageal reflux    Exposure to medical diagnostic radiation    Last treatment January 2024    Extrinsic asthma, unspecified    Head and neck cancer (HCC)    High blood pressure    HTN (hypertension)    Hx of motion sickness    Irritable bowel syndrome    Migraines    Personal history of antineoplastic chemotherapy    Last treatment January 2024    Pneumonia due to organism    PONV (postoperative nausea and vomiting)    \"violently ill\" at [facility] after Anesthesia; UI next month didn't have a problem, Anesthesia said was probably from the gas used at [place];  2024: Woke up from anesthesia during last procedure    Problems with swallowing    5/3/23 pt states has been limited to soft foods d/t growth in throat; denies difficulty swallowing liquids    Reflux    Ulcer    Visual impairment    Reading glasses      Past Surgical History:   Procedure Laterality Date    Benign biopsy right  2013    FA          Colonoscopy      D & c      Endometrial ablation      Excision turbinate,submucous  2013    Procedure: ADENOIDECTOMY;  Surgeon: Mc Ayala MD;  Location: [facility]    Excision turbinate,submucous  2013    Procedure: ADENOIDECTOMY;  Surgeon: Mc Ayala MD;  Location: [facility]    Hc  section level i      1990    Other surgical history      CS, esphogel polypectomy x2    Other surgical history      liposuction    Removal adenoids,primary,12+ y/o  2013    Procedure: ENDOSCOPIC SUBMUCOUS RESECTION INFERIOR TURBINATES;  Surgeon: Mc Ayala MD;  Location: [facility]    Repair of nasal septum  2013    Procedure: SEPTOPLASTY NASAL;  Surgeon: Mc Ayala MD;  Location: [facility]      Family History   Problem Relation Age of Onset    Cancer Father         Esophagus and lung cancer    Diabetes Father     Breast Cancer Paternal Grandmother 60        age 60's      Social History     Socioeconomic History    Marital status:     Number of children: 1   Occupational History    Occupation: [employer]     Comment: working 3 days per week   Tobacco Use    Smoking status: Every Day     Current packs/day: 0.50     Average packs/day: 0.5 packs/day for 51.0 years (25.5 ttl pk-yrs)     Types: Cigarettes     Start date:      Passive exposure: Past    Smokeless tobacco: Former     Types: Chew     Quit date: 2008   Vaping Use    Vaping status: Never Used   Substance and Sexual Activity    Alcohol use: Yes     Alcohol/week: 6.0 standard drinks of alcohol     Types: 6  Standard drinks or equivalent per week     Comment: 10/06/2023 - Patient no longer consumes alcohol    Drug use: Yes     Types: Cannabis     Comment: Cannabsis occoasioanlly      Current Medications:  Current Outpatient Medications   Medication Sig Dispense Refill    Cholecalciferol (VITAMIN D3) 10 MCG (400 UNIT) Oral Cap Take by mouth.      prochlorperazine (COMPAZINE) 10 mg tablet Take 1 tablet (10 mg total) by mouth every 6 (six) hours as needed for Nausea. (Patient not taking: Reported on 1/16/2025) 30 tablet 1    HYDROmorphone 2 MG Oral Tab Take 1 tablet (2 mg total) by mouth 3 (three) times daily as needed for Pain. 30 tablet 0    Respiratory Therapy Supplies (NEBULIZER/TUBING/MOUTHPIECE) Does not apply Kit 1 kit As Directed. Nebulizer tubing and mouthpiece only - no machine needed. 1 each 2    ondansetron (ZOFRAN) 4 mg tablet Take 1 tablet (4 mg total) by mouth every 8 (eight) hours as needed for Nausea. 30 tablet 0    gabapentin 300 MG Oral Cap Take 1 capsule (300 mg total) by mouth 2 (two) times a day AND 2 capsules (600 mg total) nightly. 180 capsule 1    SYMBICORT 160-4.5 MCG/ACT Inhalation Aerosol Inhale 2 puffs into the lungs 2 (two) times daily. Rinse mouth after use. 1 each 5    ipratropium-albuterol 0.5-2.5 (3) MG/3ML Inhalation Solution Take 3 mL by nebulization 4 (four) times daily. 360 mL 5    Respiratory Therapy Supplies (FULL KIT NEBULIZER SET) Does not apply Misc 1 kit As Directed. 1 each 0    nicotine 14 MG/24HR Transdermal Patch 24 Hr       famotidine 40 MG Oral Tab       ALPRAZOLAM 0.5 MG Oral Tab Take 1 tab po 30 minute before radiation daily 30 tablet 0    MONTELUKAST 10 MG Oral Tab TAKE 1 TABLET(10 MG) BY MOUTH EVERY NIGHT 90 tablet 0    albuterol 108 (90 Base) MCG/ACT Inhalation Aero Soln Inhale 2 puffs into the lungs every 4 (four) hours as needed. 54 g 1    fluticasone propionate 50 MCG/ACT Nasal Suspension SHAKE LIQUID AND USE 2 SPRAYS IN EACH NOSTRIL DAILY 48 g 1    pilocarpine 5 MG  Oral Tab Take 2 tablets (10 mg total) by mouth 3 (three) times daily. (Patient not taking: Reported on 1/16/2025) 180 tablet 3    pantoprazole 40 MG Oral Tab EC Take 1 tablet (40 mg total) by mouth 2 (two) times daily. (Patient not taking: Reported on 1/16/2025)      triamcinolone 0.1 % External Cream Apply 1 Application topically 2 (two) times daily. (Patient not taking: Reported on 10/28/2024) 45 g 0    cyanocobalamin 1000 MCG Oral Tab Take 1 tablet (1,000 mcg total) by mouth daily. (Patient not taking: Reported on 1/16/2025)      EPINEPHrine 0.3 MG/0.3ML Injection Solution Auto-injector Inject 0.3 mL (1 each total) as directed one time. Injectf into the muscle one time as needed for anaphylaxis for up to 2 doses (Patient not taking: Reported on 1/16/2025)          Functional Assessment: Patient reports that they are able to complete all of their ADL's such as eating, bathing, using the toilet, dressing and getting up from a bed or a chair independently.    Work History:  The patient currently is on disability.    REVIEW OF SYSTEMS:   10 point review of systems is otherwise negative,unless otherwise in HPI.      Radiology/Lab Test Reviewed:  PET 11/13/24:    CONCLUSION:  There are at least 2 areas of increased radiotracer uptake centered on the left lower lobe and right mid lung.  The radiotracer uptake is above background and concerning given their small size, concerning for areas of metastasis.     CBC:    Lab Results   Component Value Date    WBC 4.6 09/11/2024    WBC 3.9 (L) 01/23/2024    WBC 1.9 (L) 11/28/2023     Lab Results   Component Value Date    HEMOGLOBIN 12.1 07/17/2015    HEMOGLOBIN 12.2 01/09/2015    HEMOGLOBIN 12.2 10/17/2014     Lab Results   Component Value Date    .0 09/11/2024    .0 01/23/2024    .0 (L) 11/28/2023       PHYSICAL EXAMIMATION   PHYSICAL EXAMINATION: Jeannette Morales is a 54 year old female who is observed sitting comfortably on a chair in the exam room alert  and oriented times three. She looks consistent with her stated age.    Ht Readings from Last 1 Encounters:   10/21/24 62\"     Wt Readings from Last 1 Encounters:   01/16/25 119 lb (54 kg)     The patient is well developed, well nourished, normal body habitus, well muscled. She moves independently from sitting to standing with ease.       Coordination:  Well coordinated; able to engage in rapid alternating movements bilateral upper extremities    Do you have any known blood/bleeding disorders?  No  Does patient currently take blood thinners?   None  Does patient currently take any antibiotics?   No    Patient is currently on pain medications:  Yes  Reason pain medications are prescribed: Chronic pain  Pain medications are prescribed by: Other: oncology  [place] Prescription Monitoring review: yes    MEDICAL DECISION MAKING:   Impression: opiate use, laryngeal and lung cancer    Patient has pain from the shoulders through the head with a history of laryngeal and lung cancer.  Diagnosed in August 2023 completed chemo December 2023.  Has continued with radiation treatments, and is in physical therapy.  After failure of Dilaudid, hydrocodone, morphine and oxycodone did find Dilaudid to be somewhat effective, though like the other opiates, causes her nausea and vomiting.  At her last visit a month ago, had reported she was relatively stable, and had discussed potentially transitioning her here as she was motivated to be off the medication.  Unfortunately since that discussion, she has rapidly gone downhill and no longer feels as though she is stable.  In fact, she is using more medication than prescribed, and is quite emotional in today's visit.  While I am happy to get her a refill of her medication today, if she is no longer stable, asked that she discuss this with her treating oncologist, and did bring up the possibility of potentially stabilizing her through palliative care.  I will try to reach out to her referring  provider, Maira Cavazos to discuss this case.      Plan: While it sounds like she had been stable last month, she has since gone downhill significantly and is now using more dilaudid than prescribed.  She is quite emotional during our visit today, and we did discuss the possibility of possible palliative care.  That said, this is something I would obviously want to discuss with her current care team and we will try to reach out to Maira Cavazos who sent her here in the first place.      The patient indicates understanding of these issues and agrees to the plan.      Thank you very much.     Respectfully yours,    EMMANUEL Salvador

## 2025-01-16 NOTE — PROGRESS NOTES
Subjective:   Patient ID: Jeannette Morales is a 54 year old female.    HPI    History/Other:   Review of Systems  Current Outpatient Medications   Medication Sig Dispense Refill    prochlorperazine (COMPAZINE) 10 mg tablet Take 1 tablet (10 mg total) by mouth every 6 (six) hours as needed for Nausea. 30 tablet 1    HYDROmorphone 2 MG Oral Tab Take 1 tablet (2 mg total) by mouth 3 (three) times daily as needed for Pain. 30 tablet 0    Respiratory Therapy Supplies (NEBULIZER/TUBING/MOUTHPIECE) Does not apply Kit 1 kit As Directed. Nebulizer tubing and mouthpiece only - no machine needed. 1 each 2    pilocarpine 5 MG Oral Tab Take 2 tablets (10 mg total) by mouth 3 (three) times daily. 180 tablet 3    ondansetron (ZOFRAN) 4 mg tablet Take 1 tablet (4 mg total) by mouth every 8 (eight) hours as needed for Nausea. (Patient not taking: Reported on 11/13/2024) 30 tablet 0    gabapentin 300 MG Oral Cap Take 1 capsule (300 mg total) by mouth 2 (two) times a day AND 2 capsules (600 mg total) nightly. 180 capsule 1    budeson-glycopyrrol-formoterol (BREZTRI AEROSPHERE) 160-9-4.8 MCG/ACT Inhalation Aerosol Inhale 2 puffs into the lungs 2 (two) times daily. 10.7 g 5    SYMBICORT 160-4.5 MCG/ACT Inhalation Aerosol Inhale 2 puffs into the lungs 2 (two) times daily. Rinse mouth after use. 1 each 5    Tiotropium Bromide Monohydrate (SPIRIVA RESPIMAT) 2.5 MCG/ACT Inhalation Aero Soln Inhale 2 Inhalations into the lungs daily. 1 each 5    ipratropium-albuterol 0.5-2.5 (3) MG/3ML Inhalation Solution Take 3 mL by nebulization 4 (four) times daily. 360 mL 5    Respiratory Therapy Supplies (FULL KIT NEBULIZER SET) Does not apply Misc 1 kit As Directed. 1 each 0    buPROPion HCl ER, Smoking Det, 150 MG Oral Tablet 12 Hr 1 tablet (150 mg total) 2 (two) times daily. (Patient not taking: Reported on 11/13/2024)      nicotine 14 MG/24HR Transdermal Patch 24 Hr PLACE 1 PATCH ONTO THE SKIN ONCE EVERY MORNING AS DIRECTED (Patient not taking:  Reported on 10/28/2024)      pantoprazole 40 MG Oral Tab EC Take 1 tablet (40 mg total) by mouth 2 (two) times daily. (Patient not taking: Reported on 10/28/2024)      triamcinolone 0.1 % External Cream Apply 1 Application topically 2 (two) times daily. (Patient not taking: Reported on 11/13/2024) 45 g 0    famotidine 40 MG Oral Tab       ALPRAZOLAM 0.5 MG Oral Tab Take 1 tab po 30 minute before radiation daily 30 tablet 0    cyanocobalamin 1000 MCG Oral Tab Take 1 tablet (1,000 mcg total) by mouth daily. (Patient not taking: Reported on 11/13/2024)      MONTELUKAST 10 MG Oral Tab TAKE 1 TABLET(10 MG) BY MOUTH EVERY NIGHT 90 tablet 0    albuterol 108 (90 Base) MCG/ACT Inhalation Aero Soln Inhale 2 puffs into the lungs every 4 (four) hours as needed. 54 g 1    fluticasone propionate 50 MCG/ACT Nasal Suspension SHAKE LIQUID AND USE 2 SPRAYS IN EACH NOSTRIL DAILY (Patient not taking: Reported on 11/13/2024) 48 g 1    EPINEPHrine 0.3 MG/0.3ML Injection Solution Auto-injector Inject 0.3 mL (1 each total) as directed one time. Injectf into the muscle one time as needed for anaphylaxis for up to 2 doses (Patient not taking: Reported on 11/13/2024)       Allergies:Allergies[1]    Objective:   Physical Exam  Constitutional:              Assessment & Plan:   No diagnosis found.    No orders of the defined types were placed in this encounter.      Meds This Visit:  Requested Prescriptions      No prescriptions requested or ordered in this encounter       Imaging & Referrals:  None      Location of Pain: head, neck, right arm, right leg, and feet    Date Pain Began: 2023          Work Related:   No        Receiving Work Comp/Disability:   No    Numeric Rating Scale:  Pain at Present:  10                                                                                                            (No Pain) 0  to  10 (Worst Pain)                 Minimum Pain:   8  Maximum Pain  10    Distribution of Pain:    bilateral    Quality of  Pain:   aching, burning, sharp/stabbing, throbbing, tingling, and cramping, shooting    Origin of Pain:    Other Head and Neck cancer stage 3, larynx cancer stage 3, 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

## 2025-01-17 NOTE — PROGRESS NOTES
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    History of current condition: radiation ended in December of 2023 throat and neck and just last month ended for the lungs   Bilateral Lung Cancer, (which is inoperable) and Head and Neck Cancer- Squamous Cell Carcinoma of Larynx     Pain level 7/10 back of head, neck and shoulders    Current functional limitations  talking, eating, swallowing, turning her head, doing her hair and makeup     Jeannette describes prior level of function as Independent    Social History:  lives alone, was doing bartending   Self Reported Weight: 119 lbs  Pt goals include relieve pain, strengthening, decrease swelling     Past medical history was reviewed with Jeannette. Significant findings include      has a past medical history of ALCOHOL USE, Allergic rhinitis, Anemia, Anxiety (12/27/2017), Asthma ([facility]), BLOOD DISORDER, Esophageal reflux, Exposure to medical diagnostic radiation, Extrinsic asthma, unspecified, Head and neck cancer (HCC) (10/04/2023), High blood pressure, HTN (hypertension) (09/11/2023), motion sickness, Irritable bowel syndrome, Migraines, Personal history of antineoplastic chemotherapy, Pneumonia due to organism, PONV (postoperative nausea and vomiting), Problems with swallowing, Reflux, Ulcer, and Visual impairment.    She has no past medical history of Anesthesia complication, Arrhythmia, Blind, COLD/FLU, CONGESTIVE HEART FAILURE, COPD, Crohn's disease ([facility]), Dementia ([facility]), Depression, Diabetes mellitus ([facility]), Dialysis patient ([facility]), Difficult intubation, Family history of malignant hyperthermia, Family history of pseudocholinesterase deficiency,  Glaucoma, History of adverse reaction to anesthesia, History of chest pain, Hypercholesterolemia, INFECTIOUS DISEASE, Liver disease, Malignant hyperthermia, MITRAL VALVE PROLAPSE, Myocardial infarction (HCC), Neuropathy, OTHER DISEASES, Pseudocholinesterase deficiency, Seizure disorder (HCC), Shortness of breath, STROKE, Thyroid disease, Tuberculosis, or Viral hepatitis.   Precautions:      ASSESSMENT   Jeannette presents to Occupational Therapy evaluation with swelling in her face and neck. The tissue in the face and neck is soft and pliable. Jeannette reports that this is a good day for her, and normally the swelling is much worse. The swelling impacts her ability to swallow, talk and move her head.    Reason for lymphedema: Secondary Lymphedema  Stage of lymphedema: 2  Phase of treatment: Phase 1: Reduction Phase    Pt and therapist discussed evaluation findings, pathology, POC and self care/management. Skilled Occupational Therapy is medically necessary for  Complete Decongestive Therapy to reduce swelling and decrease risk of infection, STM, ROM, stretching, strengthening, garment prescription, compression device prescription, and education/self management.    OBJECTIVE:     Edema/Tissue Observations:  face right and left side from tragus to the chin and jaw to chin is soft and minimally boggy. The upper and middle neck is moderately densely boggy.  Measurements:        Head and Neck Measurements (cm)   RIGHT   LEFT   Face Volume     Tragus to corner of mouth 11.2 11   Tragus to chin point 14 14   Mandibular angle to chin point 11 11   Mandibular angle to nasal crease 10.1 10   Mandibular angle to medial eye 11 11   Mandibular angle to lateral eye 8.9 8.9   Medial eye to chin point (just lateral) 9.8 9.9   Total face composite         Facial Circumference    Diagonal: chin to crown of head    Submental:            Measurement (distance to anterior tragus:  cm):                   Neck Volume    Lower Neck  (distance from  tragus: 11.5cm) 32.1   Middle Neck  (distance from tragus: 11cm) 32.9   Upper Neck  (distance from tragus: 10cm) 34.2   Total neck composite 99.2              Posture:  fair, sits with an anterior shoulder position.  Sensation:  no reports of numbness or tingling     AROM:  WFL jaw/neck/shoulders    Lymphedema Life Impact Scale: Score: 83. (0% is no impairment, 100% total impairment)      Today’s Treatment and Response:   Date 1/17/2025    Visit # 1/12    Manual Therapy (20 minutes)  MLD: initiated MLD to head and neck   Deep diaphragmatic breathing             There Ex ( 0 minutes):        Self-Care  (10 minutes):   Management/Education: Explained Lymphedema diagnosis; informed patient of lymphedema precautions and risk reduction practices, and importance of skin care. Discussed and demonstrated bandaging and compression options         Charges: Occupational Therapy Eval: Moderate Complexity, self care x 1, manual therapy x 1       Total Timed Treatment: 30 min     Total Treatment Time: 60 min     Based on clinical rationale and outcome measures, this evaluation involved Moderate Complexity decision making due to 3+ personal factors/comorbidities, 3 body structures involved/activity limitations, and evolving symptoms including pain and swelling      PLAN OF CARE:    Goals:  (to be met in 12 visits)  1 Pt will wear clinic-fabricated head/neck compression  for 4-6 hours/day.   2 Pt will be independent in HEP.  3 Pt will be independent in self-manual lymph drainage.   4 Pt will obtain good fitting head/neck compression garment.     5 Reduce neck  lymphedema volume by 5-7 cm to improve appearance of neck .    6 Reduce neck lymphedema density to soft and supple with soft and pliable  superficial tissue mobility to reduce sense of \"tightness\" and reduce infection risk of infection.   7 Pt will be independent in use of compression garments, self-manual lymph drainag and lymphedema precautions for life-long self-management  of lymphedema.    9. Improve lymphedema life impact score to 40 percent   Frequency / Duration: Patient will be seen for 1-2 x/week or a total of 12 visits over a 90 day period.     Treatment will include: Complete Decongestive Therapy: Manual Therapy, Self-Care/Home Management Training, Therapeutic Exercise, Neuromuscular Re-education, Orthotic Management and Training    Education or treatment limitation: None  Rehab Potential:good    Patient/Family/Caregiver was advised of these findings, precautions, and treatment options and has agreed to actively participate in planning and for this course of care.    Thank you for your referral. Please co-sign or sign and return this letter via fax as soon as possible to [phone]. If you have any questions, please contact me at Dept: [phone]    Sincerely,  Electronically signed by therapist: Ale Rodriguez, OT  Physician's certification required: Yes  I certify the need for these services furnished under this plan of treatment and while under my care.    X___________________________________________________ Date____________________    Certification From: 1/17/2025  To:4/17/2025

## 2025-01-17 NOTE — PROGRESS NOTES
Palliative Care Follow Up Note     Patient Name: Jeannette Morales   YOB: 1970   Medical Record Number: [id number]   CSN: [id number]   Date of visit: 1/17/2025     Chief Complaint/Reason for Visit:  Pain follow up     History of Present Illness:         Jeannette Morales is a 54 year old female with larynx cancer who completed chemo/RT a year ago.  She continues to complain of bilateral shoulder, neck,jaw and ear pain that is constant and varies in intensity.  The pain is deep achy throbbing pain and can be sharp at times.   She has been working with ST/PT/OT for massage and ROM to help pain related to scar tissue.  She does feel this has helped soften area but still has persistent pain.  She feels her voice quality is improving also.  She currently is using dilaudid 2mg TID alternating with ibuprofen 400mg and acetaminophen 500mg.  She feels gabapentin has been helpful but doesn't tolerate dose escalation.  She is using compazine 3X daily to help with nausea caused by dilaudid.   She is able to sleep at night.  She remains frustrated since pain isn't optimized and any opioid causes nausea.                    Problem List:  Patient Active Problem List   Diagnosis    Asthma (HCC)    Deviated nasal septum    GERD (gastroesophageal reflux disease)    Tobacco abuse    Fibroadenoma    Verruca    Sinusitis    Knee contusion    Esophageal spasm    Iron deficiency anemia due to chronic blood loss    Sinusitis, acute    Subacute pansinusitis    Iron deficiency    Mass of breast, right    Breast tenderness    Chronic bronchitis (HCC)    Persistent cough    Fatigue, unspecified type    Menorrhagia with regular cycle    Anxiety    Epiglottic lesion    Seasonal allergies    Sore throat    Head and neck cancer (HCC)    Malignant neoplasm of overlapping sites of larynx (HCC)    Palliative care by specialist    HTN (hypertension)    Nausea      Medical History:  Past Medical History:    ALCOHOL USE    Allergic rhinitis     Anemia    Anxiety    Asthma (HCC)    BLOOD DISORDER    anemia    Esophageal reflux    Exposure to medical diagnostic radiation    Last treatment 2024    Extrinsic asthma, unspecified    Head and neck cancer (HCC)    High blood pressure    HTN (hypertension)    Hx of motion sickness    Irritable bowel syndrome    Migraines    Personal history of antineoplastic chemotherapy    Last treatment 2024    Pneumonia due to organism    PONV (postoperative nausea and vomiting)    \"violently ill\" at [facility] after Anesthesia; UI next month didn't have a problem, Anesthesia said was probably from the gas used at [facility]; 2024: Woke up from anesthesia during last procedure    Problems with swallowing    5/3/23 pt states has been limited to soft foods d/t growth in throat; denies difficulty swallowing liquids    Reflux    Ulcer    Visual impairment    Reading glasses     Surgical History:  Past Surgical History:   Procedure Laterality Date    Benign biopsy right  2013    FA          Colonoscopy      D & c      Endometrial ablation      Excision turbinate,submucous  2013    Procedure: ADENOIDECTOMY;  Surgeon: Mc Ayala MD;  Location: [facility]    Excision turbinate,submucous  2013    Procedure: ADENOIDECTOMY;  Surgeon: Mc Ayala MD;  Location: [facility]    Hc  section level i          Other surgical history      CS, esphogel polypectomy x2    Other surgical history      liposuction    Removal adenoids,primary,12+ y/o  2013    Procedure: ENDOSCOPIC SUBMUCOUS RESECTION INFERIOR TURBINATES;  Surgeon: Mc Ayala MD;  Location: [facility]    Repair of nasal septum  2013    Procedure: SEPTOPLASTY NASAL;  Surgeon: Mc Ayala MD;  Location: [facility]       Allergies:  Allergies   Allergen Reactions    Latex RASH and OTHER (SEE COMMENTS)    Ceftin [Cefuroxime] RASH    Codeine     Oxycodone      Vicodin [Hydrocodone-Acetaminophen] OTHER (SEE COMMENTS)     Per patient allergy was noted 20 years ago, uncofirmed if true allergy     Clindamycin RASH    Penicillins RASH     Adult allergy - no hospitalization, no cardiac or organ damage       Palliative Care Social History:    Marital Status:  she is engaged to Ayad who is very supportive    Functional History:    ADLs: Independent.  Not working currently which frustrates her    Medications:  Current Outpatient Medications   Medication Sig Dispense Refill    HYDROmorphone 2 MG Oral Tab Take 1 tablet (2 mg total) by mouth 3 (three) times daily as needed for Pain. 60 tablet 0    cyclobenzaprine 5 MG Oral Tab Take 1 tablet (5 mg total) by mouth 3 (three) times daily as needed for Muscle spasms. 60 tablet 0    prochlorperazine (COMPAZINE) 10 mg tablet Take 1 tablet (10 mg total) by mouth every 6 (six) hours as needed for Nausea. 90 tablet 0    gabapentin 300 MG Oral Cap Take 1 capsule (300 mg total) by mouth 2 (two) times a day AND 2 capsules (600 mg total) nightly. (Patient taking differently: Take 1 capsule (300 mg total) by mouth 3 (two) times a day AND 2 capsules (600 mg total) nightly.) 180 capsule 1    Cholecalciferol (VITAMIN D3) 10 MCG (400 UNIT) Oral Cap Take by mouth.      Respiratory Therapy Supplies (NEBULIZER/TUBING/MOUTHPIECE) Does not apply Kit 1 kit As Directed. Nebulizer tubing and mouthpiece only - no machine needed. 1 each 2    pilocarpine 5 MG Oral Tab Take 2 tablets (10 mg total) by mouth 3 (three) times daily. (Patient not taking: Reported on 1/16/2025) 180 tablet 3    ondansetron (ZOFRAN) 4 mg tablet Take 1 tablet (4 mg total) by mouth every 8 (eight) hours as needed for Nausea. 30 tablet 0    SYMBICORT 160-4.5 MCG/ACT Inhalation Aerosol Inhale 2 puffs into the lungs 2 (two) times daily. Rinse mouth after use. 1 each 5    ipratropium-albuterol 0.5-2.5 (3) MG/3ML Inhalation Solution Take 3 mL by nebulization 4 (four) times daily. 360 mL 5     Respiratory Therapy Supplies (FULL KIT NEBULIZER SET) Does not apply Misc 1 kit As Directed. 1 each 0    nicotine 14 MG/24HR Transdermal Patch 24 Hr       pantoprazole 40 MG Oral Tab EC Take 1 tablet (40 mg total) by mouth 2 (two) times daily. (Patient not taking: Reported on 1/16/2025)      triamcinolone 0.1 % External Cream Apply 1 Application topically 2 (two) times daily. (Patient not taking: Reported on 10/28/2024) 45 g 0    famotidine 40 MG Oral Tab       ALPRAZOLAM 0.5 MG Oral Tab Take 1 tab po 30 minute before radiation daily 30 tablet 0    cyanocobalamin 1000 MCG Oral Tab Take 1 tablet (1,000 mcg total) by mouth daily. (Patient not taking: Reported on 1/16/2025)      MONTELUKAST 10 MG Oral Tab TAKE 1 TABLET(10 MG) BY MOUTH EVERY NIGHT 90 tablet 0    albuterol 108 (90 Base) MCG/ACT Inhalation Aero Soln Inhale 2 puffs into the lungs every 4 (four) hours as needed. 54 g 1    fluticasone propionate 50 MCG/ACT Nasal Suspension SHAKE LIQUID AND USE 2 SPRAYS IN EACH NOSTRIL DAILY 48 g 1    EPINEPHrine 0.3 MG/0.3ML Injection Solution Auto-injector Inject 0.3 mL (1 each total) as directed one time. Injectf into the muscle one time as needed for anaphylaxis for up to 2 doses (Patient not taking: Reported on 1/16/2025)         Review of Systems:  General:  Fatigue.  Feels well.    Respiratory:  Denies SOB, denies cough  Cardiac:  Denies chest pain, heart palpitations  Abdomen:  Denies constipation, diarrhea.  Denies pain.  Nausea today  Psych:  No complaints.  Sleeping well    Palliative Performance Scale:  100 %    Physical Examination:  General: Patient is alert and oriented, not in acute distress.  HEENT: no edema or redness noted  Skin:  intact  Psych:  Mood/Affect appropriate    Advanced Directives Discussed and Completed:     HCPOA/Health Surrogate:      There is no completed HCPOA documentation on file in [facility].  Pain was focus today    Palliative Care:   Patient feels her QOL is not good as she has not  recovered from her cancer as expected.  She is still not able to get a job due to pain and hoarse voice.  She is highly motivated to get off dilaudid since she doesn't feel good while taking it.  She has tried multiple opioids in past which have caused even worse N/V.    She may be developing a little tolerance as pain control is not as good.  Therapy may be contributing, as well.    She remains worried about disease returning.  She has scans ordered in a couple of weeks.      She continues to work with PT/OT/ST since she feels this helping with voice and neck ROM.    She is trying to increase activity and endurance  Discussed long term use of dilaudid for this pain is not optimal.  Will trial cyclobenzaprine since she has used this in the past with benefit and tolerated it without issues  She has not tolerated gabapentin dose escalation and wants to hold off on duloxetine  She is hesitant to trial more than one different drug at a time since many cause N/V for her.  She doesn't like taking medications and weaned off opioids quickly in the past.    She is hoping to return to work as things improve.    Her pain is sequela from past cancer treatment and most likely will be chronic.    Palliative will continue to follow until repeat imaging and onc follow up.  If she remains disease free without active treatment, discussed need to follow with pain clinic for ongoing support.  Her PCP has told her he is not willing to manage her chronic post cancer treatment pain.    Discussed with Neil Longoria with pain clinic.  Can transition to pain clinic next month if patient remains disease free.    Support provided for patient.      Impression/Plan:   Chronic Pain after cancer treatment  Dilaudid 2mg Q 8 prn  Acetaminophen 1G QID prn  Ibuprofen 400mg Q 6 prn  Gabapentin 300/300/600mg  PT/OT/ST to neck area  Cyclobenzaprine 5mg TID  Pain clinic referral      Planned Follow up: Return in about 1 month (around 2/17/2025).      I spent  a total of 50 minutes with the patient today, which included all of the following:direct face to face contact, history taking, physical examination, and >50% was spent counseling and coordinating care        The 21st Century Cures Act makes medical notes like these available to patients in the interest of transparency. Please be advised this is a medical document. Medical documents are intended to carry relevant information, facts as evident, and the clinical opinion of the practitioner. The medical note is intended as peer to peer communication and may appear blunt or direct. It is written in medical language and may contain abbreviations or verbiage that are unfamiliar.        Electronically Signed by:  RAIN GALAVIZ Outpatient Palliative Nurse Practitioner

## 2025-01-21 NOTE — PROGRESS NOTES
Dx: Head and Neck pain following cancer dx         Authorized # of Visits:  6         Next MD visit: none scheduled  Fall Risk: standard         Precautions: n/a           Goal Number: 12    Subjective: Started OT yesterday.  States that she receives similar intervention as she receives here in PT.  States that the neck pain persists as more of a stiffness.  Has been instructed on a lymphatic massage.    Objective: Treatment progressed per treatment log.      Date:12/17/2024 TX#: 2/6 Date:12/20/2024  TX#: 3/   Date: 12/31/2024  TX#: 4/ Date:1/15/2025  TX#: 5/ Date:1/21/2025  TX#: 6/ Date:               TX#: 7/ Date:               TX#: 8/   VAS 7/10 VAS /10 VAS 5/10 VAS /10 VAS 7/10 VAS /10 VAS /10   TherEx (10 Min) TherEx (10 Min) TherEx (10 Min) TherEx (10 Min) TherEx (10 Min)     Nods supine x 10 Nods supine x 10 Nods supine x 10 Nods supine x 10 Nods supine x 10     Resisted rotation isometrically x 10 Resisted rotation isometrically x 10 Resisted rotation isometrically x 10 Resisted rotation isometrically x 10 Resisted rotation isometrically x 10        Levator Scapulae Stretch 30 sec x 3 Levator Scapulae Stretch 30 sec x 3                                         Manual PT (40 Min) Manual PT (40 Min) Manual PT (40 Min) Manual PT (40 Min) Manual PT (40 Min)     Manual PT to upper trapezius and levator scapulae; DTM and sustained stretch.   Manual PT to upper trapezius and levator scapulae; DTM and sustained stretch.  Manual PT to upper trapezius and levator scapulae; DTM and sustained stretch. Manual PT to upper trapezius and levator scapulae; DTM and sustained stretch. Manual PT to upper trapezius and levator scapulae; DTM and sustained stretch.                         Assessment: The patient responded well to today's intervention. Cervical mobility is full.        Goals (8 visits):    1. Improve upon NDI assessment > 11% from INE to DC.  2. Patient will be aware of postural limitations and be able to correct  them independently.    3. Patient will have an increase in spinal mobility to >75% in order to return to more normalized function and exercise.    4. Patient will have an increase in core strength to assist with returning to sustained posturing and looking downward.  5. Patient will demonstrate an increase in MLT of the anterior thorax and cervical spine in order to return to more normalized cervical and orofacial function.     Plan: Assess response to today's treatment and progress as tolerated.      Charges: TherEx 1 (10 min); Manual PT 3 (40 min)      Total Timed Treatment: 50 min  Total Treatment Time: 50 min

## 2025-01-21 NOTE — PROGRESS NOTES
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 Significant findings include      has a past medical history of ALCOHOL USE, Allergic rhinitis, Anemia, Anxiety (12/27/2017), Asthma ([facility]), BLOOD DISORDER, Esophageal reflux, Exposure to medical diagnostic radiation, Extrinsic asthma, unspecified, Head and neck cancer (HCC) (10/04/2023), High blood pressure, HTN (hypertension) (09/11/2023), motion sickness, Irritable bowel syndrome, Migraines, Personal history of antineoplastic chemotherapy, Pneumonia due to organism, PONV (postoperative nausea and vomiting), Problems with swallowing, Reflux, Ulcer, and Visual impairment.    She has no past medical history of Anesthesia complication, Arrhythmia, Blind, COLD/FLU, CONGESTIVE HEART FAILURE, COPD, Crohn's disease ([facility]), Dementia ([facility]), Depression, Diabetes  mellitus (HCC), Dialysis patient (HCC), Difficult intubation, Family history of malignant hyperthermia, Family history of pseudocholinesterase deficiency, Glaucoma, History of adverse reaction to anesthesia, History of chest pain, Hypercholesterolemia, INFECTIOUS DISEASE, Liver disease, Malignant hyperthermia, MITRAL VALVE PROLAPSE, Myocardial infarction (HCC), Neuropathy, OTHER DISEASES, Pseudocholinesterase deficiency, Seizure disorder (HCC), Shortness of breath, STROKE, Thyroid disease, Tuberculosis, or Viral hepatitis.   Precautions:  Lymphedema         Subjective:     Patient reports that her head and neck feel better since she started physical therapy       Objective:    Today’s Treatment and Response:   Date 1/17/2025 1/21/2025     Visit # 1/12 Visit # 2/12   Manual Therapy (20 minutes)  MLD: initiated MLD to head and neck   Deep diaphragmatic breathing          Manual Therapy  40 min    MLD to head, neck and face     Soft tissue mobilization to hardened radiated areas     Deep diaphragmatic breathing     Discussed anatomy and function of the lymphatic system during treatment     Discussed next visit getting a compression garment to wear as much as possible during the day    There Ex ( 0 minutes):        Self-Care  (10 minutes):   Management/Education: Explained Lymphedema diagnosis; informed patient of lymphedema precautions and risk reduction practices, and importance of skin care. Discussed and demonstrated bandaging and compression options         Observation:  face right and left side from tragus to the chin and jaw to chin is soft and minimally boggy. The upper and middle neck is moderately densely boggy.     Measurements: none taken        Assessment:      Patient will benefit from wearing compression as much as possible during the day with an added scar pad for additional compression    Goals:  (to be met in 12 visits)  1 Pt will wear clinic-fabricated head/neck compression  for 4-6 hours/day.   2 Pt  will be independent in HEP.  3 Pt will be independent in self-manual lymph drainage.   4 Pt will obtain good fitting head/neck compression garment.     5 Reduce neck  lymphedema volume by 5-7 cm to improve appearance of neck .    6 Reduce neck lymphedema density to soft and supple with soft and pliable  superficial tissue mobility to reduce sense of \"tightness\" and reduce infection risk of infection.   7 Pt will be independent in use of compression garments, self-manual lymph drainag and lymphedema precautions for life-long self-management of lymphedema.    9. Improve lymphedema life impact score to 40 percent    Frequency / Duration: Patient will be seen for 1-2 x/week or a total of 12 visits over a 90 day period.       Plan:   Continue current plan/next session fabricate a head/neck compression       Charges: manual Therapy x 3    Total Timed Treatment: 40 min  Total Treatment Time: 40 min

## 2025-01-24 NOTE — PROGRESS NOTES
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 Significant findings include      has a past medical history of ALCOHOL USE, Allergic rhinitis, Anemia, Anxiety (12/27/2017), Asthma ([facility]), BLOOD DISORDER, Esophageal reflux, Exposure to medical diagnostic radiation, Extrinsic asthma, unspecified, Head and neck cancer (HCC) (10/04/2023), High blood pressure, HTN (hypertension) (09/11/2023), motion sickness, Irritable bowel syndrome, Migraines, Personal history of antineoplastic chemotherapy, Pneumonia due to organism, PONV (postoperative nausea and vomiting), Problems with swallowing, Reflux, Ulcer, and Visual impairment.    She has no past medical history of Anesthesia complication, Arrhythmia, Blind, COLD/FLU, CONGESTIVE HEART FAILURE, COPD, Crohn's disease ([facility]), Dementia ([facility]), Depression, Diabetes  mellitus (HCC), Dialysis patient (HCC), Difficult intubation, Family history of malignant hyperthermia, Family history of pseudocholinesterase deficiency, Glaucoma, History of adverse reaction to anesthesia, History of chest pain, Hypercholesterolemia, INFECTIOUS DISEASE, Liver disease, Malignant hyperthermia, MITRAL VALVE PROLAPSE, Myocardial infarction (HCC), Neuropathy, OTHER DISEASES, Pseudocholinesterase deficiency, Seizure disorder (HCC), Shortness of breath, STROKE, Thyroid disease, Tuberculosis, or Viral hepatitis.   Precautions:  Lymphedema         Subjective:     Patient reports that her head and neck feel better since she started physical therapy       Objective:    Today’s Treatment and Response:   Date 1/17/2025 1/21/2025 1/23/2025     Visit # 1/12 Visit # 2/12 Visit # 3/12   Manual Therapy (20 minutes)  MLD: initiated MLD to head and neck   Deep diaphragmatic breathing          Manual Therapy  40 min    MLD to head, neck and face     Soft tissue mobilization to hardened radiated areas     Deep diaphragmatic breathing     Discussed anatomy and function of the lymphatic system during treatment     Discussed next visit getting a compression garment to wear as much as possible during the day  Manual Therapy  40 min    MLD to head, neck and face     Soft tissue mobilization to hardened radiated areas     Deep diaphragmatic breathing     Next visit will fabricate an otoform pad for compression with a tubigrip support     Neck flexion/extension, jaw open and close    There Ex ( 0 minutes):         Self-Care  (10 minutes):   Management/Education: Explained Lymphedema diagnosis; informed patient of lymphedema precautions and risk reduction practices, and importance of skin care. Discussed and demonstrated bandaging and compression options          Observation:  face right and left side from tragus to the chin and jaw to chin is soft and minimally boggy. The upper and middle neck is moderately densely boggy.      Measurements: none taken        Assessment:      Patient would benefit from a home pneumatic pump to maintain volume once discharged     Goals:  (to be met in 12 visits)  1 Pt will wear clinic-fabricated head/neck compression  for 4-6 hours/day.   2 Pt will be independent in HEP.  3 Pt will be independent in self-manual lymph drainage.   4 Pt will obtain good fitting head/neck compression garment.     5 Reduce neck  lymphedema volume by 5-7 cm to improve appearance of neck .    6 Reduce neck lymphedema density to soft and supple with soft and pliable  superficial tissue mobility to reduce sense of \"tightness\" and reduce infection risk of infection.   7 Pt will be independent in use of compression garments, self-manual lymph drainag and lymphedema precautions for life-long self-management of lymphedema.    9. Improve lymphedema life impact score to 40 percent    Frequency / Duration: Patient will be seen for 1-2 x/week or a total of 12 visits over a 90 day period.       Plan:   Continue current plan/next session fabricate a head/neck compression       Charges: manual Therapy x 3    Total Timed Treatment: 40 min  Total Treatment Time: 40 min

## 2025-01-24 NOTE — PROGRESS NOTES
Dx: Head and Neck pain following cancer dx         Authorized # of Visits:  6         Next MD visit: none scheduled  Fall Risk: standard         Precautions: n/a           Goal Number: 12    Subjective: States that the neck has been stiff as of late.  States that she has been waking up with stiffness.  States she has been doing her exercises to help loosen it up. States she's tired all the time.  States she has high pain tolerance.   MD has ordered a CAT scan for head an neck.    Objective: Treatment progressed per treatment log.      Date:12/17/2024 TX#: 2/6 Date:12/20/2024  TX#: 3/   Date: 12/31/2024  TX#: 4/ Date:1/15/2025  TX#: 5/ Date:1/21/2025  TX#: 6/ Date:1/24/2025  TX#: 7/ Date:               TX#: 8/   VAS 7/10 VAS /10 VAS 5/10 VAS /10 VAS 7/10 VAS 10/10 - \"but not going to ED.\" VAS /10   TherEx (10 Min) TherEx (10 Min) TherEx (10 Min) TherEx (10 Min) TherEx (10 Min) TherEx (10 Min)    Nods supine x 10 Nods supine x 10 Nods supine x 10 Nods supine x 10 Nods supine x 10 Nods supine x 10    Resisted rotation isometrically x 10 Resisted rotation isometrically x 10 Resisted rotation isometrically x 10 Resisted rotation isometrically x 10 Resisted rotation isometrically x 10 Resisted rotation isometrically x 10       Levator Scapulae Stretch 30 sec x 3 Levator Scapulae Stretch 30 sec x 3 Levator Scapulae Stretch 30 sec x 3                                        Manual PT (40 Min) Manual PT (40 Min) Manual PT (40 Min) Manual PT (40 Min) Manual PT (40 Min) Manual PT (40 Min)    Manual PT to upper trapezius and levator scapulae; DTM and sustained stretch.   Manual PT to upper trapezius and levator scapulae; DTM and sustained stretch.  Manual PT to upper trapezius and levator scapulae; DTM and sustained stretch. Manual PT to upper trapezius and levator scapulae; DTM and sustained stretch. Manual PT to upper trapezius and levator scapulae; DTM and sustained stretch. Manual PT to upper trapezius and levator scapulae;  DTM and sustained stretch.                        Assessment: The patient responded well to today's intervention. Cervical mobility is full.  Improved subcranial mobility in general.  Strength has improved overall.        Goals (8 visits):    1. Improve upon NDI assessment > 11% from [facility] to [facility].  2. Patient will be aware of postural limitations and be able to correct them independently.    3. Patient will have an increase in spinal mobility to >75% in order to return to more normalized function and exercise.    4. Patient will have an increase in core strength to assist with returning to sustained posturing and looking downward.  5. Patient will demonstrate an increase in MLT of the anterior thorax and cervical spine in order to return to more normalized cervical and orofacial function.     Plan: DC PT    Charges: TherEx 1 (10 min); Manual PT 3 (40 min)      Total Timed Treatment: 50 min  Total Treatment Time: 50 min

## 2025-01-30 NOTE — PROGRESS NOTES
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 Significant findings include      has a past medical history of ALCOHOL USE, Allergic rhinitis, Anemia, Anxiety (12/27/2017), Asthma ([facility]), BLOOD DISORDER, Esophageal reflux, Exposure to medical diagnostic radiation, Extrinsic asthma, unspecified, Head and neck cancer (HCC) (10/04/2023), High blood pressure, HTN (hypertension) (09/11/2023), motion sickness, Irritable bowel syndrome, Migraines, Personal history of antineoplastic chemotherapy, Pneumonia due to organism, PONV (postoperative nausea and vomiting), Problems with swallowing, Reflux, Ulcer, and Visual impairment.    She has no past medical history of Anesthesia complication, Arrhythmia, Blind, COLD/FLU, CONGESTIVE HEART FAILURE, COPD, Crohn's disease ([facility]), Dementia ([facility]), Depression, Diabetes  mellitus (HCC), Dialysis patient (HCC), Difficult intubation, Family history of malignant hyperthermia, Family history of pseudocholinesterase deficiency, Glaucoma, History of adverse reaction to anesthesia, History of chest pain, Hypercholesterolemia, INFECTIOUS DISEASE, Liver disease, Malignant hyperthermia, MITRAL VALVE PROLAPSE, Myocardial infarction (HCC), Neuropathy, OTHER DISEASES, Pseudocholinesterase deficiency, Seizure disorder (HCC), Shortness of breath, STROKE, Thyroid disease, Tuberculosis, or Viral hepatitis.   Precautions:  Lymphedema         Subjective:     Patient reports that her face feels swollen today    Pain  6/10  Objective:    Today’s Treatment and Response:   Date 1/17/2025 1/21/2025 1/23/2025 01/28/2025   Visit # 1/12 Visit # 2/12 Visit # 3/12 4   Manual Therapy (20 minutes)  MLD: initiated MLD to head and neck   Deep diaphragmatic breathing          Manual Therapy  40 min    MLD to head, neck and face     Soft tissue mobilization to hardened radiated areas     Deep diaphragmatic breathing     Discussed anatomy and function of the lymphatic system during treatment     Discussed next visit getting a compression garment to wear as much as possible during the day  Manual Therapy  40 min    MLD to head, neck and face     Soft tissue mobilization to hardened radiated areas     Deep diaphragmatic breathing     Next visit will fabricate an otoform pad for compression with a tubigrip support     Neck flexion/extension, jaw open and close  Manual Therapy  50 min    MLD to head, neck and face     Soft tissue mobilization to hardened radiated areas     Deep diaphragmatic breathing     Fabricated an otoform pad to wear for compression over the fibrotic tissue in the neck/throat with size 10 3 ply around the otofotm to wear at night or at home during the day    There Ex ( 0 minutes):          Self-Care  (10 minutes):   Management/Education: Explained Lymphedema diagnosis; informed patient of  lymphedema precautions and risk reduction practices, and importance of skin care. Discussed and demonstrated bandaging and compression options           Observation:  face right and left side from tragus to the chin and jaw to chin is soft and minimally boggy. The upper and middle neck is moderately densely boggy.     Measurements: none taken        Assessment:      Patient is starting to demonstrate understanding of self MLD but would be better off with the home pneumatic pump for life long management of lymphedema    Goals:  (to be met in 12 visits)  1 Pt will wear clinic-fabricated head/neck compression  for 4-6 hours/day.   2 Pt will be independent in HEP.  3 Pt will be independent in self-manual lymph drainage.   4 Pt will obtain good fitting head/neck compression garment.     5 Reduce neck  lymphedema volume by 5-7 cm to improve appearance of neck .    6 Reduce neck lymphedema density to soft and supple with soft and pliable  superficial tissue mobility to reduce sense of \"tightness\" and reduce infection risk of infection.   7 Pt will be independent in use of compression garments, self-manual lymph drainag and lymphedema precautions for life-long self-management of lymphedema.    9. Improve lymphedema life impact score to 40 percent    Frequency / Duration: Patient will be seen for 1-2 x/week or a total of 12 visits over a 90 day period.       Plan:   Continue current plan/n      Charges: manual Therapy x 3    Total Timed Treatment: 45min  Total Treatment Time: 50 min

## 2025-02-01 NOTE — PROGRESS NOTES
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 Significant findings include      has a past medical history of ALCOHOL USE, Allergic rhinitis, Anemia, Anxiety (12/27/2017), Asthma ([facility]), BLOOD DISORDER, Esophageal reflux, Exposure to medical diagnostic radiation, Extrinsic asthma, unspecified, Head and neck cancer (HCC) (10/04/2023), High blood pressure, HTN (hypertension) (09/11/2023), motion sickness, Irritable bowel syndrome, Migraines, Personal history of antineoplastic chemotherapy, Pneumonia due to organism, PONV (postoperative nausea and vomiting), Problems with swallowing, Reflux, Ulcer, and Visual impairment.    She has no past medical history of Anesthesia complication, Arrhythmia, Blind, COLD/FLU, CONGESTIVE HEART FAILURE, COPD, Crohn's disease ([facility]), Dementia ([facility]), Depression, Diabetes  mellitus (HCC), Dialysis patient (HCC), Difficult intubation, Family history of malignant hyperthermia, Family history of pseudocholinesterase deficiency, Glaucoma, History of adverse reaction to anesthesia, History of chest pain, Hypercholesterolemia, INFECTIOUS DISEASE, Liver disease, Malignant hyperthermia, MITRAL VALVE PROLAPSE, Myocardial infarction (HCC), Neuropathy, OTHER DISEASES, Pseudocholinesterase deficiency, Seizure disorder (HCC), Shortness of breath, STROKE, Thyroid disease, Tuberculosis, or Viral hepatitis.   Precautions:  Lymphedema         Subjective:     Patient reports that her face feels swollen today    Pain  6/10  Objective:    Today’s Treatment and Response:   Date 1/17/2025 1/21/2025 1/23/2025 01/28/2025 01.31.2025   Visit # 1/12 Visit # 2/12 Visit # 3/12 4 5   Manual Therapy (20 minutes)  MLD: initiated MLD to head and neck   Deep diaphragmatic breathing          Manual Therapy  40 min    MLD to head, neck and face     Soft tissue mobilization to hardened radiated areas     Deep diaphragmatic breathing     Discussed anatomy and function of the lymphatic system during treatment     Discussed next visit getting a compression garment to wear as much as possible during the day  Manual Therapy  40 min    MLD to head, neck and face     Soft tissue mobilization to hardened radiated areas     Deep diaphragmatic breathing     Next visit will fabricate an otoform pad for compression with a tubigrip support     Neck flexion/extension, jaw open and close  Manual Therapy  50 min    MLD to head, neck and face     Soft tissue mobilization to hardened radiated areas     Deep diaphragmatic breathing     Fabricated an otoform pad to wear for compression over the fibrotic tissue in the neck/throat with size 10 3 ply around the otofotm to wear at night or at home during the day  anual Therapy  50 min    MLD to head, neck and face     Soft tissue mobilization to hardened radiated areas     Deep  diaphragmatic breathing     Discussed wearing the otoform pad and compression when able during the day to soften the scar and decrease the swelling   There Ex ( 0 minutes):           Self-Care  (10 minutes):   Management/Education: Explained Lymphedema diagnosis; informed patient of lymphedema precautions and risk reduction practices, and importance of skin care. Discussed and demonstrated bandaging and compression options            Observation:  face right and left side from tragus to the chin and jaw to chin is soft and minimally boggy. The upper and middle neck is moderately densely boggy.     Measurements: none taken        Assessment:      Patient presents with softer tissue quality in the throat after MLD and scar mobilization, allowing her to swallow easier    Goals:  (to be met in 12 visits)  1 Pt will wear clinic-fabricated head/neck compression  for 4-6 hours/day.   2 Pt will be independent in HEP.  3 Pt will be independent in self-manual lymph drainage.   4 Pt will obtain good fitting head/neck compression garment.     5 Reduce neck  lymphedema volume by 5-7 cm to improve appearance of neck .    6 Reduce neck lymphedema density to soft and supple with soft and pliable  superficial tissue mobility to reduce sense of \"tightness\" and reduce infection risk of infection.   7 Pt will be independent in use of compression garments, self-manual lymph drainag and lymphedema precautions for life-long self-management of lymphedema.    9. Improve lymphedema life impact score to 40 percent    Frequency / Duration: Patient will be seen for 1-2 x/week or a total of 12 visits over a 90 day period.       Plan:   Continue current plan/n      Charges: manual Therapy x 3    Total Timed Treatment: 45min  Total Treatment Time: 50 min

## 2025-02-05 NOTE — PROGRESS NOTES
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 Significant findings include      has a past medical history of ALCOHOL USE, Allergic rhinitis, Anemia, Anxiety (12/27/2017), Asthma ([facility]), BLOOD DISORDER, Esophageal reflux, Exposure to medical diagnostic radiation, Extrinsic asthma, unspecified, Head and neck cancer (HCC) (10/04/2023), High blood pressure, HTN (hypertension) (09/11/2023), motion sickness, Irritable bowel syndrome, Migraines, Personal history of antineoplastic chemotherapy, Pneumonia due to organism, PONV (postoperative nausea and vomiting), Problems with swallowing, Reflux, Ulcer, and Visual impairment.    She has no past medical history of Anesthesia complication, Arrhythmia, Blind, COLD/FLU, CONGESTIVE HEART FAILURE, COPD, Crohn's disease ([facility]), Dementia ([facility]), Depression, Diabetes  mellitus (HCC), Dialysis patient (HCC), Difficult intubation, Family history of malignant hyperthermia, Family history of pseudocholinesterase deficiency, Glaucoma, History of adverse reaction to anesthesia, History of chest pain, Hypercholesterolemia, INFECTIOUS DISEASE, Liver disease, Malignant hyperthermia, MITRAL VALVE PROLAPSE, Myocardial infarction (HCC), Neuropathy, OTHER DISEASES, Pseudocholinesterase deficiency, Seizure disorder (HCC), Shortness of breath, STROKE, Thyroid disease, Tuberculosis, or Viral hepatitis.   Precautions:  Lymphedema         Subjective:     Patient reports that her face feels swollen today    Pain  6/10  Objective:    Today’s Treatment and Response:   Date 1/17/2025 1/21/2025 1/23/2025 01/28/2025 01.31.2025 2/4/2025     Visit # 1/12 Visit # 2/12 Visit # 3/12 4 5 6   Manual Therapy (20 minutes)  MLD: initiated MLD to head and neck   Deep diaphragmatic breathing          Manual Therapy  40 min    MLD to head, neck and face     Soft tissue mobilization to hardened radiated areas     Deep diaphragmatic breathing     Discussed anatomy and function of the lymphatic system during treatment     Discussed next visit getting a compression garment to wear as much as possible during the day  Manual Therapy  40 min    MLD to head, neck and face     Soft tissue mobilization to hardened radiated areas     Deep diaphragmatic breathing     Next visit will fabricate an otoform pad for compression with a tubigrip support     Neck flexion/extension, jaw open and close  Manual Therapy  50 min    MLD to head, neck and face     Soft tissue mobilization to hardened radiated areas     Deep diaphragmatic breathing     Fabricated an otoform pad to wear for compression over the fibrotic tissue in the neck/throat with size 10 3 ply around the otofotm to wear at night or at home during the day  anual Therapy  50 min    MLD to head, neck and face     Soft tissue mobilization to hardened radiated areas     Deep  diaphragmatic breathing     Discussed wearing the otoform pad and compression when able during the day to soften the scar and decrease the swelling Manual Therapy  55 min    MLD to head, neck and face     Soft tissue mobilization to hardened radiated areas     Deep diaphragmatic breathing     Pt. And therapist met with the  today to discuss options for compression. Pt. Order would be retail. Due to expense, pt. May benefit from a RTW post surgery neck and chin compression.    There Ex ( 0 minutes):            Self-Care  (10 minutes):   Management/Education: Explained Lymphedema diagnosis; informed patient of lymphedema precautions and risk reduction practices, and importance of skin care. Discussed and demonstrated bandaging and compression options             Observation:  face right and left side from tragus to the chin and jaw to chin is soft and minimally boggy. The upper and middle neck is moderately densely boggy.     Measurements: none taken        Assessment:      Patient will benefit from a tribute garment or a soft post surgery neck and chin compression RTW with a chip bag added.     Goals:  (to be met in 12 visits)  1 Pt will wear clinic-fabricated head/neck compression  for 4-6 hours/day.   2 Pt will be independent in HEP.  3 Pt will be independent in self-manual lymph drainage.   4 Pt will obtain good fitting head/neck compression garment.     5 Reduce neck  lymphedema volume by 5-7 cm to improve appearance of neck .    6 Reduce neck lymphedema density to soft and supple with soft and pliable  superficial tissue mobility to reduce sense of \"tightness\" and reduce infection risk of infection.   7 Pt will be independent in use of compression garments, self-manual lymph drainag and lymphedema precautions for life-long self-management of lymphedema.    9. Improve lymphedema life impact score to 40 percent    Frequency / Duration: Patient will be seen for 1-2 x/week or a total of 12 visits over  a 90 day period.       Plan:   Continue current plan/n      Charges: manual Therapy x 4   Total Timed Treatment:   55min  Total Treatment Time: 55 min

## 2025-02-11 NOTE — PROGRESS NOTES
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 Significant findings include      has a past medical history of ALCOHOL USE, Allergic rhinitis, Anemia, Anxiety (12/27/2017), Asthma ([facility]), BLOOD DISORDER, Esophageal reflux, Exposure to medical diagnostic radiation, Extrinsic asthma, unspecified, Head and neck cancer (HCC) (10/04/2023), High blood pressure, HTN (hypertension) (09/11/2023), motion sickness, Irritable bowel syndrome, Migraines, Personal history of antineoplastic chemotherapy, Pneumonia due to organism, PONV (postoperative nausea and vomiting), Problems with swallowing, Reflux, Ulcer, and Visual impairment.    She has no past medical history of Anesthesia complication, Arrhythmia, Blind, COLD/FLU, CONGESTIVE HEART FAILURE, COPD, Crohn's disease ([facility]), Dementia ([facility]), Depression, Diabetes  mellitus (HCC), Dialysis patient (HCC), Difficult intubation, Family history of malignant hyperthermia, Family history of pseudocholinesterase deficiency, Glaucoma, History of adverse reaction to anesthesia, History of chest pain, Hypercholesterolemia, INFECTIOUS DISEASE, Liver disease, Malignant hyperthermia, MITRAL VALVE PROLAPSE, Myocardial infarction (HCC), Neuropathy, OTHER DISEASES, Pseudocholinesterase deficiency, Seizure disorder (HCC), Shortness of breath, STROKE, Thyroid disease, Tuberculosis, or Viral hepatitis.   Precautions:  Lymphedema         Subjective:     Patient reports that her eyes feel less swollen today  Pain 7 /10  Objective:    Today’s Treatment and Response:   Date 1/17/2025 1/21/2025 1/23/2025 01/28/2025 01.31.2025 2/4/2025 2/11/2025     Visit # 1/12 Visit # 2/12 Visit # 3/12 4 5 6 7   Manual Therapy (20 minutes)  MLD: initiated MLD to head and neck   Deep diaphragmatic breathing          Manual Therapy  40 min    MLD to head, neck and face     Soft tissue mobilization to hardened radiated areas     Deep diaphragmatic breathing     Discussed anatomy and function of the lymphatic system during treatment     Discussed next visit getting a compression garment to wear as much as possible during the day  Manual Therapy  40 min    MLD to head, neck and face     Soft tissue mobilization to hardened radiated areas     Deep diaphragmatic breathing     Next visit will fabricate an otoform pad for compression with a tubigrip support     Neck flexion/extension, jaw open and close  Manual Therapy  50 min    MLD to head, neck and face     Soft tissue mobilization to hardened radiated areas     Deep diaphragmatic breathing     Fabricated an otoform pad to wear for compression over the fibrotic tissue in the neck/throat with size 10 3 ply around the otofotm to wear at night or at home during the day  anual Therapy  50 min    MLD to head, neck and face     Soft tissue mobilization to hardened  radiated areas     Deep diaphragmatic breathing     Discussed wearing the otoform pad and compression when able during the day to soften the scar and decrease the swelling Manual Therapy  55 min    MLD to head, neck and face     Soft tissue mobilization to hardened radiated areas     Deep diaphragmatic breathing        Manual Therapy  4 0 min    MLD to head, neck and face     Soft tissue mobilization to hardened radiated areas     Deep diaphragmatic breathing     Pt. Notes that she did [facility]    She is going to have a demonstration of the flexitouch on Monday    There Ex ( 0 minutes):             Self-Care  (10 minutes):   Management/Education: Explained Lymphedema diagnosis; informed patient of lymphedema precautions and risk reduction practices, and importance of skin care. Discussed and demonstrated bandaging and compression options              Observation:  face right and left side from tragus to the chin and jaw to chin is soft and minimally boggy. The upper and middle neck is moderately densely boggy.     Measurements: none taken        Assessment:      Decreased soft tissue tightness at the submental and neck. Pt. Reports increased ability to swallow and her voice is getting stronger     Goals:  (to be met in 12 visits)  1 Pt will wear clinic-fabricated head/neck compression  for 4-6 hours/day.   2 Pt will be independent in HEP.  3 Pt will be independent in self-manual lymph drainage.   4 Pt will obtain good fitting head/neck compression garment.     5 Reduce neck  lymphedema volume by 5-7 cm to improve appearance of neck .    6 Reduce neck lymphedema density to soft and supple with soft and pliable  superficial tissue mobility to reduce sense of \"tightness\" and reduce infection risk of infection.   7 Pt will be independent in use of compression garments, self-manual lymph drainag and lymphedema precautions for life-long self-management of lymphedema.    9. Improve lymphedema  life impact score to 40 percent    Frequency / Duration: Patient will be seen for 1-2 x/week or a total of 12 visits over a 90 day period.       Plan:   Continue current plan/n      Charges: manual Therapy x 3  Total Timed Treatment:   40 min  Total Treatment Time: 40 min

## 2025-02-13 NOTE — PROGRESS NOTES
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  Treatment has focused on complete decongestive therapy which consists of manual lymphatic drainage, skin care, therapeutic and decongestive exercises and compression.    Jeannette Morales is a 54 year old female who presents to therapy today with concerns of swelling in her face and neck.    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 Significant findings include      has a past medical history of ALCOHOL USE, Allergic rhinitis, Anemia, Anxiety (12/27/2017), Asthma (HCC), BLOOD DISORDER, Esophageal reflux, Exposure to medical diagnostic radiation, Extrinsic asthma, unspecified, Head and neck cancer (HCC) (10/04/2023), High blood pressure, HTN (hypertension) (09/11/2023), motion sickness, Irritable bowel syndrome, Migraines, Personal history of antineoplastic chemotherapy, Pneumonia due to organism, PONV (postoperative nausea and vomiting),  Problems with swallowing, Reflux, Ulcer, and Visual impairment.    She has no past medical history of Anesthesia complication, Arrhythmia, Blind, COLD/FLU, CONGESTIVE HEART FAILURE, COPD, Crohn's disease (HCC), Dementia (HCC), Depression, Diabetes mellitus (HCC), Dialysis patient ([facility]), Difficult intubation, Family history of malignant hyperthermia, Family history of pseudocholinesterase deficiency, Glaucoma, History of adverse reaction to anesthesia, History of chest pain, Hypercholesterolemia, INFECTIOUS DISEASE, Liver disease, Malignant hyperthermia, MITRAL VALVE PROLAPSE, Myocardial infarction (HCC), Neuropathy, OTHER DISEASES, Pseudocholinesterase deficiency, Seizure disorder (HCC), Shortness of breath, STROKE, Thyroid disease, Tuberculosis, or Viral hepatitis.   Precautions:  Lymphedema         Subjective:     Patient reports that se does try to sleep elevated   Patient also reports that she feels the progress with therapy   Patient reports that her voice is getting stronger and she is able to swallow easier   Pain 7 /10  Objective:    Today’s Treatment and Response:   Date 1/17/2025 1/21/2025 1/23/2025 01/28/2025 01.31.2025 2/4/2025 2/11/2025 2/13/2025     Visit # 1/12 Visit # 2/12 Visit # 3/12 4 5 6 7 8   Manual Therapy (20 minutes)  MLD: initiated MLD to head and neck   Deep diaphragmatic breathing          Manual Therapy  40 min    MLD to head, neck and face     Soft tissue mobilization to hardened radiated areas     Deep diaphragmatic breathing     Discussed anatomy and function of the lymphatic system during treatment     Discussed next visit getting a compression garment to wear as much as possible during the day  Manual Therapy  40 min    MLD to head, neck and face     Soft tissue mobilization to hardened radiated areas     Deep diaphragmatic breathing     Next visit will fabricate an otoform pad for compression with a tubigrip support     Neck flexion/extension, jaw open and close  Manual  Therapy  50 min    MLD to head, neck and face     Soft tissue mobilization to hardened radiated areas     Deep diaphragmatic breathing     Fabricated an otoform pad to wear for compression over the fibrotic tissue in the neck/throat with size 10 3 ply around the otofotm to wear at night or at home during the day  anual Therapy  50 min    MLD to head, neck and face     Soft tissue mobilization to hardened radiated areas     Deep diaphragmatic breathing     Discussed wearing the otoform pad and compression when able during the day to soften the scar and decrease the swelling Manual Therapy  55 min    MLD to head, neck and face     Soft tissue mobilization to hardened radiated areas     Deep diaphragmatic breathing        Manual Therapy  4 0 min    MLD to head, neck and face     Soft tissue mobilization to hardened radiated areas     Deep diaphragmatic breathing     Pt. Notes that she did order the RTW compression from [employer]    She is going to have a demonstration of the flexitouch on Monday  Manual Therapy  40 min    MLD to head, neck and face     Soft tissue mobilization to hardened radiated areas     Deep diaphragmatic breathing    Fabricated a foam chip bag for pt. To wear to soften up the fibrotic area in the neck region. Pt. Will wear the chip bag under her compression garment    Introduced pt. To the theracane for trigger point release in the upper traps.   There Ex ( 0 minutes):           There ex 15 min    Postural exercises on 1/2 foam roll shoulder flexion and pectoral stretch     Gentle neck stretches   Chin tucks   Jaw exercises     Measurements/PN   Self-Care  (10 minutes):   Management/Education: Explained Lymphedema diagnosis; informed patient of lymphedema precautions and risk reduction practices, and importance of skin care. Discussed and demonstrated bandaging and compression options               Observation:  face right and left side from tragus to the chin and jaw to chin is soft and minimally  boggy. The upper and middle neck is moderately densely boggy.     Measurements: 2 cm decrease in neck volume                             3.2 cm decrease in right facial volume                             2.8 cm decrease in left facial volume       Assessment:      Patient presents with a 2 cm decrease in neck volume and facial volume is also decrease on the right and left side. Patient recently ordered a RTW compression garment online. She is I with self MLD, but would benefit from a sequential pneumatic lymphedema pump for  home for life long management of her stage 3 lymphedema.She is making progress in therapy with decreasing volume, improving her ability to swallow and speak more clearly. She will benefit from 8 additional visits to the remaining 4 in this episode.     Goals:  (to be met in 12 visits)  1 Pt will wear clinic-fabricated head/neck compression  for 4-6 hours/day. met  2 Pt will be independent in HEP. met  3 Pt will be independent in self-manual lymph drainage. met  4 Pt will obtain good fitting head/neck compression garment.  continue    5 Reduce neck  lymphedema volume by 5-7 cm to improve appearance of neck .  continue  6 Reduce neck lymphedema density to soft and supple with soft and pliable  superficial tissue mobility to reduce sense of \"tightness\" and reduce infection risk of infection. continue  7 Pt will be independent in use of compression garments, self-manual lymph drainag and lymphedema precautions for life-long self-management of lymphedema.  continue  9. Improve lymphedema life impact score to 40 percent continue    Continue unmet goals         Plan: Continue skilled Occupational Therapy 1-2 x/week for 8 additional visits for  a total of 20 visits over a 90 day period. Treatment will include: complete decongestive therapy        Patient/Family/Caregiver was advised of these findings, precautions, and treatment options and has agreed to actively participate in planning and for this  course of care.    Thank you for your referral. If you have any questions, please contact me at Dept: [phone].    Sincerely,  Electronically signed by therapist: Ale Rodriguez, OT     Physician's certification required:  No  Please co-sign or sign and return this letter via fax as soon as possible to [phone].   I certify the need for these services furnished under this plan of treatment and while under my care.    X___________________________________________________ Date____________________      Charges: manual Therapy x 3, there ex x 1   Total Timed Treatment:   55 min  Total Treatment Time: 60 min    Measurements:        Head and Neck Measurements (cm)   RIGHT   LEFT Right   2/13/2025   Left  2/13/2025     Face Volume       Tragus to corner of mouth 11.2 11 10.5 10.2   Tragus to chin point 14 14 13.1 13.2   Mandibular angle to chin point 11 11 10 10.1   Mandibular angle to nasal crease 10.1 10 9.9. 9.4   Mandibular angle to medial eye 11 11 11.1 11.4   Mandibular angle to lateral eye 8.9 8.9 8.7 8.9   Medial eye to chin point (just lateral) 9.8 9.9 9.5 9.8   Total face composite 76 75.8 72.8 73     3.2 improvement  2.8 improvement   Facial Circumference      Diagonal: chin to crown of head      Submental:              Measurement (distance to anterior tragus:  cm):                         Neck Volume      Lower Neck  (distance from tragus: 11.5cm) 32.1 31.6    Middle Neck  (distance from tragus: 11cm) 32.9 32.9    Upper Neck  (distance from tragus: 10cm) 34.2 33.2    Total neck composite 99.2 97.7

## 2025-02-21 NOTE — PROGRESS NOTES
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 Significant findings include      has a past medical history of ALCOHOL USE, Allergic rhinitis, Anemia, Anxiety (12/27/2017), Asthma ([facility]), BLOOD DISORDER, Esophageal reflux, Exposure to medical diagnostic radiation, Extrinsic asthma, unspecified, Head and neck cancer (HCC) (10/04/2023), High blood pressure, HTN (hypertension) (09/11/2023), motion sickness, Irritable bowel syndrome, Migraines, Personal history of antineoplastic chemotherapy, Pneumonia due to organism, PONV (postoperative nausea and vomiting), Problems with swallowing, Reflux, Ulcer, and Visual impairment.    She has no past medical history of Anesthesia complication, Arrhythmia, Blind, COLD/FLU, CONGESTIVE HEART FAILURE, COPD, Crohn's disease ([facility]), Dementia ([facility]), Depression,  Diabetes mellitus (HCC), Dialysis patient (HCC), Difficult intubation, Family history of malignant hyperthermia, Family history of pseudocholinesterase deficiency, Glaucoma, History of adverse reaction to anesthesia, History of chest pain, Hypercholesterolemia, INFECTIOUS DISEASE, Liver disease, Malignant hyperthermia, MITRAL VALVE PROLAPSE, Myocardial infarction (HCC), Neuropathy, OTHER DISEASES, Pseudocholinesterase deficiency, Seizure disorder (HCC), Shortness of breath, STROKE, Thyroid disease, Tuberculosis, or Viral hepatitis.   Precautions:  Lymphedema         Subjective:   Patient reports that her speech therapist noticed how much her swelling as improved   Pain 7 /10  Objective:    Today’s Treatment and Response:   Date 1/17/2025 1/21/2025 1/23/2025 01/28/2025 01.31.2025 2/4/2025 2/11/2025 2/13/2025 2/21/2025     Visit # 1/12 Visit # 2/12 Visit # 3/12 4 5 6 7 8 9   Manual Therapy (20 minutes)  MLD: initiated MLD to head and neck   Deep diaphragmatic breathing          Manual Therapy  40 min    MLD to head, neck and face     Soft tissue mobilization to hardened radiated areas     Deep diaphragmatic breathing     Discussed anatomy and function of the lymphatic system during treatment     Discussed next visit getting a compression garment to wear as much as possible during the day  Manual Therapy  40 min    MLD to head, neck and face     Soft tissue mobilization to hardened radiated areas     Deep diaphragmatic breathing     Next visit will fabricate an otoform pad for compression with a tubigrip support     Neck flexion/extension, jaw open and close  Manual Therapy  50 min    MLD to head, neck and face     Soft tissue mobilization to hardened radiated areas     Deep diaphragmatic breathing     Fabricated an otoform pad to wear for compression over the fibrotic tissue in the neck/throat with size 10 3 ply around the otofotm to wear at night or at home during the day  anual Therapy  50 min    MLD to  head, neck and face     Soft tissue mobilization to hardened radiated areas     Deep diaphragmatic breathing     Discussed wearing the otoform pad and compression when able during the day to soften the scar and decrease the swelling Manual Therapy  55 min    MLD to head, neck and face     Soft tissue mobilization to hardened radiated areas     Deep diaphragmatic breathing        Manual Therapy  4 0 min    MLD to head, neck and face     Soft tissue mobilization to hardened radiated areas     Deep diaphragmatic breathing     Pt. Notes that she did order the RTW compression from [facility]    She is going to have a demonstration of the flexitouch on Monday  Manual Therapy  40 min    MLD to head, neck and face     Soft tissue mobilization to hardened radiated areas     Deep diaphragmatic breathing    Fabricated a foam chip bag for pt. To wear to soften up the fibrotic area in the neck region. Pt. Will wear the chip bag under her compression garment    Introduced pt. To the theracane for trigger point release in the upper traps. Manual Therapy  40 min    MLD to head, neck and face     Soft tissue mobilization to hardened radiated areas     Deep diaphragmatic breathing    Gentle neck stretchs    There Ex ( 0 minutes):           There ex 15 min    Postural exercises on 1/2 foam roll shoulder flexion and pectoral stretch     Gentle neck stretches   Chin tucks   Jaw exercises     Measurements/PN    Self-Care  (10 minutes):   Management/Education: Explained Lymphedema diagnosis; informed patient of lymphedema precautions and risk reduction practices, and importance of skin care. Discussed and demonstrated bandaging and compression options                Observation:  face right and left side from tragus to the chin and jaw to chin is soft and minimally boggy. The upper and middle neck is moderately densely boggy.     Measurements: 2 cm decrease in neck volume                             3.2 cm decrease in right facial volume                              2.8 cm decrease in left facial volume       Assessment:      Pt. With an observable decrease in neck volume, but a slight increase in the right cheek    Goals:  (to be met in 12 visits)  1 Pt will wear clinic-fabricated head/neck compression  for 4-6 hours/day. met  2 Pt will be independent in HEP. met  3 Pt will be independent in self-manual lymph drainage. met  4 Pt will obtain good fitting head/neck compression garment.  continue    5 Reduce neck  lymphedema volume by 5-7 cm to improve appearance of neck .  continue  6 Reduce neck lymphedema density to soft and supple with soft and pliable  superficial tissue mobility to reduce sense of \"tightness\" and reduce infection risk of infection. continue  7 Pt will be independent in use of compression garments, self-manual lymph drainag and lymphedema precautions for life-long self-management of lymphedema.  continue  9. Improve lymphedema life impact score to 40 percent continue    Continue unmet goals         Plan: Continue skilled Occupational Therapy 1-2 x/week for 8 additional visits for  a total of 20 visits over a 90 day period. Treatment will include: complete decongestive therapy        Patient/Family/Caregiver was advised of these findings, precautions, and treatment options and has agreed to actively participate in planning and for this course of care.    Thank you for your referral. If you have any questions, please contact me at Dept: [phone].    Sincerely,  Electronically signed by therapist: Ale Rodriguez OT     Physician's certification required:  No  Please co-sign or sign and return this letter via fax as soon as possible to [phone].   I certify the need for these services furnished under this plan of treatment and while under my care.    X___________________________________________________ Date____________________      Charges: manual Therapy x 3 Total Timed Treatment:   40 min  Total Treatment Time: 40  min    Measurements:        Head and Neck Measurements (cm)   RIGHT   LEFT Right   2/13/2025   Left  2/13/2025     Face Volume       Tragus to corner of mouth 11.2 11 10.5 10.2   Tragus to chin point 14 14 13.1 13.2   Mandibular angle to chin point 11 11 10 10.1   Mandibular angle to nasal crease 10.1 10 9.9. 9.4   Mandibular angle to medial eye 11 11 11.1 11.4   Mandibular angle to lateral eye 8.9 8.9 8.7 8.9   Medial eye to chin point (just lateral) 9.8 9.9 9.5 9.8   Total face composite 76 75.8 72.8 73     3.2 improvement  2.8 improvement   Facial Circumference      Diagonal: chin to crown of head      Submental:              Measurement (distance to anterior tragus:  cm):                         Neck Volume      Lower Neck  (distance from tragus: 11.5cm) 32.1 31.6    Middle Neck  (distance from tragus: 11cm) 32.9 32.9    Upper Neck  (distance from tragus: 10cm) 34.2 33.2    Total neck composite 99.2 97.7

## 2025-02-26 NOTE — PROGRESS NOTES
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 Significant findings include      has a past medical history of ALCOHOL USE, Allergic rhinitis, Anemia, Anxiety (12/27/2017), Asthma ([facility]), BLOOD DISORDER, Esophageal reflux, Exposure to medical diagnostic radiation, Extrinsic asthma, unspecified, Head and neck cancer (HCC) (10/04/2023), High blood pressure, HTN (hypertension) (09/11/2023), motion sickness, Irritable bowel syndrome, Migraines, Personal history of antineoplastic chemotherapy, Pneumonia due to organism, PONV (postoperative nausea and vomiting), Problems with swallowing, Reflux, Ulcer, and Visual impairment.    She has no past medical history of Anesthesia complication, Arrhythmia, Blind, COLD/FLU, CONGESTIVE HEART FAILURE, COPD, Crohn's disease ([facility]), Dementia ([facility]), Depression,  Diabetes mellitus (HCC), Dialysis patient (HCC), Difficult intubation, Family history of malignant hyperthermia, Family history of pseudocholinesterase deficiency, Glaucoma, History of adverse reaction to anesthesia, History of chest pain, Hypercholesterolemia, INFECTIOUS DISEASE, Liver disease, Malignant hyperthermia, MITRAL VALVE PROLAPSE, Myocardial infarction (HCC), Neuropathy, OTHER DISEASES, Pseudocholinesterase deficiency, Seizure disorder (HCC), Shortness of breath, STROKE, Thyroid disease, Tuberculosis, or Viral hepatitis.   Precautions:  Lymphedema         Subjective:   Patient reports when she woke up in the morning, her cheeks were very swollen  Patient also reports that her headaches are less painful after treatment   Pain 7 /10  Objective:    Today’s Treatment and Response:   Date 1/17/2025 1/21/2025 1/23/2025 01/28/2025 01.31.2025 2/4/2025 2/11/2025 2/13/2025 2/21/2025 2/26/2025     Visit # 1/12 Visit # 2/12 Visit # 3/12 4 5 6 7 8 9 10   Manual Therapy (20 minutes)  MLD: initiated MLD to head and neck   Deep diaphragmatic breathing          Manual Therapy  40 min    MLD to head, neck and face     Soft tissue mobilization to hardened radiated areas     Deep diaphragmatic breathing     Discussed anatomy and function of the lymphatic system during treatment     Discussed next visit getting a compression garment to wear as much as possible during the day  Manual Therapy  40 min    MLD to head, neck and face     Soft tissue mobilization to hardened radiated areas     Deep diaphragmatic breathing     Next visit will fabricate an otoform pad for compression with a tubigrip support     Neck flexion/extension, jaw open and close  Manual Therapy  50 min    MLD to head, neck and face     Soft tissue mobilization to hardened radiated areas     Deep diaphragmatic breathing     Fabricated an otoform pad to wear for compression over the fibrotic tissue in the neck/throat with size 10 3 ply around the  otofotm to wear at night or at home during the day  anual Therapy  50 min    MLD to head, neck and face     Soft tissue mobilization to hardened radiated areas     Deep diaphragmatic breathing     Discussed wearing the otoform pad and compression when able during the day to soften the scar and decrease the swelling Manual Therapy  55 min    MLD to head, neck and face     Soft tissue mobilization to hardened radiated areas     Deep diaphragmatic breathing        Manual Therapy  4 0 min    MLD to head, neck and face     Soft tissue mobilization to hardened radiated areas     Deep diaphragmatic breathing     Pt. Notes that she did order the RTW compression from [employer]    She is going to have a demonstration of the flexitouch on Monday  Manual Therapy  40 min    MLD to head, neck and face     Soft tissue mobilization to hardened radiated areas     Deep diaphragmatic breathing    Fabricated a foam chip bag for pt. To wear to soften up the fibrotic area in the neck region. Pt. Will wear the chip bag under her compression garment    Introduced pt. To the theracane for trigger point release in the upper traps. Manual Therapy  40 min    MLD to head, neck and face     Soft tissue mobilization to hardened radiated areas     Deep diaphragmatic breathing    Gentle neck stretchs  Manual Therapy  40 min    MLD to head, neck and face     Soft tissue mobilization to hardened radiated areas     Deep diaphragmatic breathing    Gentle neck stretchs     Discussed use of the home pneumatic pump once she receives it. She can use it 1-2 x per day.    Pt. Notes that her compression should arrive today   There Ex ( 0 minutes):           There ex 15 min    Postural exercises on 1/2 foam roll shoulder flexion and pectoral stretch     Gentle neck stretches   Chin tucks   Jaw exercises     Measurements/PN     Self-Care  (10 minutes):   Management/Education: Explained Lymphedema diagnosis; informed patient of lymphedema precautions and risk  reduction practices, and importance of skin care. Discussed and demonstrated bandaging and compression options                 Observation:  face right and left side from tragus to the chin and jaw to chin is soft and minimally boggy. The upper and middle neck is moderately densely boggy.     Measurements: 2 cm decrease in neck volume                             3.2 cm decrease in right facial volume                             2.8 cm decrease in left facial volume       Assessment:      Pt. With an observable increase in swelling in her cheeks. Pt. Does present with an observable decrease in volume in her cheeks after treatment.    Goals:  (to be met in 12 visits)  1 Pt will wear clinic-fabricated head/neck compression  for 4-6 hours/day. met  2 Pt will be independent in HEP. met  3 Pt will be independent in self-manual lymph drainage. met  4 Pt will obtain good fitting head/neck compression garment.  continue    5 Reduce neck  lymphedema volume by 5-7 cm to improve appearance of neck .  continue  6 Reduce neck lymphedema density to soft and supple with soft and pliable  superficial tissue mobility to reduce sense of \"tightness\" and reduce infection risk of infection. continue  7 Pt will be independent in use of compression garments, self-manual lymph drainag and lymphedema precautions for life-long self-management of lymphedema.  continue  9. Improve lymphedema life impact score to 40 percent continue    Continue unmet goals         Plan: Continue skilled Occupational Therapy 1-2 x/week for 8 additional visits for  a total of 20 visits over a 90 day period. Treatment will include: complete decongestive therapy        Patient/Family/Caregiver was advised of these findings, precautions, and treatment options and has agreed to actively participate in planning and for this course of care.    Thank you for your referral. If you have any questions, please contact me at Dept: [phone].    Sincerely,  Electronically  signed by therapist: Ale Rodriguez, OT     Physician's certification required:  No  Please co-sign or sign and return this letter via fax as soon as possible to [phone].   I certify the need for these services furnished under this plan of treatment and while under my care.    X___________________________________________________ Date____________________      Charges: manual Therapy x 3 Total Timed Treatment:   40 min  Total Treatment Time: 40 min    Measurements:        Head and Neck Measurements (cm)   RIGHT   LEFT Right   2/13/2025   Left  2/13/2025     Face Volume       Tragus to corner of mouth 11.2 11 10.5 10.2   Tragus to chin point 14 14 13.1 13.2   Mandibular angle to chin point 11 11 10 10.1   Mandibular angle to nasal crease 10.1 10 9.9. 9.4   Mandibular angle to medial eye 11 11 11.1 11.4   Mandibular angle to lateral eye 8.9 8.9 8.7 8.9   Medial eye to chin point (just lateral) 9.8 9.9 9.5 9.8   Total face composite 76 75.8 72.8 73     3.2 improvement  2.8 improvement   Facial Circumference      Diagonal: chin to crown of head      Submental:              Measurement (distance to anterior tragus:  cm):                         Neck Volume      Lower Neck  (distance from tragus: 11.5cm) 32.1 31.6    Middle Neck  (distance from tragus: 11cm) 32.9 32.9    Upper Neck  (distance from tragus: 10cm) 34.2 33.2    Total neck composite 99.2 97.7

## 2025-02-26 NOTE — TELEPHONE ENCOUNTER
Pt is calling to schedule a follow up appt with Dr. Case on 3/11/25     Please give the pt a call back

## 2025-03-04 NOTE — PROGRESS NOTES
spoke with patient and wrote updated letter. Left at [address] Desk for patient to  on 25.    Letter:   Jeannette Mccollum ( 1970) is a patient under my care at [facility] for her diagnosis of newly diagnosed Bilateral Lung Cancer, (which is inoperable) and Head and Neck Cancer- Squamous Cell Carcinoma of Larynx. She is actively undergoing Radiation treatments, resulting in pain, fatigue, loss of endurance, skin irritation, and loss of voice. She continues to suffer from Neuropathy and Lymphedema due to treatment as well. She is unable to work due to her disease and treatment, causing her financial burden.

## 2025-03-07 NOTE — PROGRESS NOTES
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 Significant findings include      has a past medical history of ALCOHOL USE, Allergic rhinitis, Anemia, Anxiety (12/27/2017), Asthma ([facility]), BLOOD DISORDER, Esophageal reflux, Exposure to medical diagnostic radiation, Extrinsic asthma, unspecified, Head and neck cancer (HCC) (10/04/2023), High blood pressure, HTN (hypertension) (09/11/2023), motion sickness, Irritable bowel syndrome, Migraines, Personal history of antineoplastic chemotherapy, Pneumonia due to organism, PONV (postoperative nausea and vomiting), Problems with swallowing, Reflux, Ulcer, and Visual impairment.    She has no past medical history of Anesthesia complication, Arrhythmia, Blind, COLD/FLU, CONGESTIVE HEART FAILURE, COPD, Crohn's disease ([facility]), Dementia ([facility]), Depression,  Diabetes mellitus (HCC), Dialysis patient (HCC), Difficult intubation, Family history of malignant hyperthermia, Family history of pseudocholinesterase deficiency, Glaucoma, History of adverse reaction to anesthesia, History of chest pain, Hypercholesterolemia, INFECTIOUS DISEASE, Liver disease, Malignant hyperthermia, MITRAL VALVE PROLAPSE, Myocardial infarction (HCC), Neuropathy, OTHER DISEASES, Pseudocholinesterase deficiency, Seizure disorder (HCC), Shortness of breath, STROKE, Thyroid disease, Tuberculosis, or Viral hepatitis.   Precautions:  Lymphedema         Subjective:   Patient reports that the pain yesterday was really bad 8/10  Patient also reports that she did her massage 2 x yesterday   Patient reports that she got the wrong compression in the mail. She reports that she ordered a new one.  Pain 4 /10  current  Objective:    Today’s Treatment and Response:   Date 1/17/2025 1/21/2025   1/23/2025   01/28/2025 01.31.2025 2/4/2025   2/11/2025   2/13/2025   2/21/2025   2/26/2025   3/7/2025     Visit # 1/12 Visit # 2/12 Visit # 3/12 4 5 6 7 8 9 10 11   Manual Therapy (20 minutes)  MLD: initiated MLD to head and neck   Deep diaphragmatic breathing          Manual Therapy  40 min    MLD to head, neck and face     Soft tissue mobilization to hardened radiated areas     Deep diaphragmatic breathing     Discussed anatomy and function of the lymphatic system during treatment     Discussed next visit getting a compression garment to wear as much as possible during the day  Manual Therapy  40 min    MLD to head, neck and face     Soft tissue mobilization to hardened radiated areas     Deep diaphragmatic breathing     Next visit will fabricate an otoform pad for compression with a tubigrip support     Neck flexion/extension, jaw open and close  Manual Therapy  50 min    MLD to head, neck and face     Soft tissue mobilization to hardened radiated areas     Deep diaphragmatic breathing     Fabricated an otoform pad to  wear for compression over the fibrotic tissue in the neck/throat with size 10 3 ply around the otofotm to wear at night or at home during the day  anual Therapy  50 min    MLD to head, neck and face     Soft tissue mobilization to hardened radiated areas     Deep diaphragmatic breathing     Discussed wearing the otoform pad and compression when able during the day to soften the scar and decrease the swelling Manual Therapy  55 min    MLD to head, neck and face     Soft tissue mobilization to hardened radiated areas     Deep diaphragmatic breathing        Manual Therapy  4 0 min    MLD to head, neck and face     Soft tissue mobilization to hardened radiated areas     Deep diaphragmatic breathing     Pt. Notes that she did order the RTW compression from [employer]    She is going to have a demonstration of the flexitouch on Monday  Manual Therapy  40 min    MLD to head, neck and face     Soft tissue mobilization to hardened radiated areas     Deep diaphragmatic breathing    Fabricated a foam chip bag for pt. To wear to soften up the fibrotic area in the neck region. Pt. Will wear the chip bag under her compression garment    Introduced pt. To the theracane for trigger point release in the upper traps. Manual Therapy  40 min    MLD to head, neck and face     Soft tissue mobilization to hardened radiated areas     Deep diaphragmatic breathing    Gentle neck stretchs  Manual Therapy  40 min    MLD to head, neck and face     Soft tissue mobilization to hardened radiated areas     Deep diaphragmatic breathing    Gentle neck stretchs     Discussed use of the home pneumatic pump once she receives it. She can use it 1-2 x per day.    Pt. Notes that her compression should arrive today Manual Therapy  40 min    MLD to head, neck and face     Soft tissue mobilization to hardened radiated areas     Deep diaphragmatic breathing    Gentle neck stretchs     Applied kinesiotape over radiated fibrotic tissue in the mid throat. Discussed  with pt. To keep the tape on for up to 2 days.    There Ex ( 0 minutes):           There ex 15 min    Postural exercises on 1/2 foam roll shoulder flexion and pectoral stretch     Gentle neck stretches   Chin tucks   Jaw exercises     Measurements/PN      Self-Care  (10 minutes):   Management/Education: Explained Lymphedema diagnosis; informed patient of lymphedema precautions and risk reduction practices, and importance of skin care. Discussed and demonstrated bandaging and compression options                  Observation:  face right and left side from tragus to the chin and jaw to chin is soft and minimally boggy. The upper and middle neck is moderately densely boggy.     Measurements: 2 cm decrease in neck volume                             3.2 cm decrease in right facial volume                             2.8 cm decrease in left facial volume       Assessment:      Pt. May benefit from a trial of kinesiotaping to soften scar tissue from radiation in the throat area    Goals:  (to be met in 12 visits)  1 Pt will wear clinic-fabricated head/neck compression  for 4-6 hours/day. met  2 Pt will be independent in HEP. met  3 Pt will be independent in self-manual lymph drainage. met  4 Pt will obtain good fitting head/neck compression garment.  continue    5 Reduce neck  lymphedema volume by 5-7 cm to improve appearance of neck .  continue  6 Reduce neck lymphedema density to soft and supple with soft and pliable  superficial tissue mobility to reduce sense of \"tightness\" and reduce infection risk of infection. continue  7 Pt will be independent in use of compression garments, self-manual lymph drainag and lymphedema precautions for life-long self-management of lymphedema.  continue  9. Improve lymphedema life impact score to 40 percent continue    Continue unmet goals         Plan: Continue skilled Occupational Therapy 1-2 x/week for 8 additional visits for  a total of 20 visits over a 90 day period. Treatment will  include: complete decongestive therapy        Patient/Family/Caregiver was advised of these findings, precautions, and treatment options and has agreed to actively participate in planning and for this course of care.    Thank you for your referral. If you have any questions, please contact me at Dept: [phone].    Sincerely,  Electronically signed by therapist: lAe Rodriguez, OT     Physician's certification required:  No  Please co-sign or sign and return this letter via fax as soon as possible to [phone].   I certify the need for these services furnished under this plan of treatment and while under my care.    X___________________________________________________ Date____________________      Charges: manual Therapy x 3 Total Timed Treatment:   40 min  Total Treatment Time: 40 min    Measurements:        Head and Neck Measurements (cm)   RIGHT   LEFT Right   2/13/2025   Left  2/13/2025     Face Volume       Tragus to corner of mouth 11.2 11 10.5 10.2   Tragus to chin point 14 14 13.1 13.2   Mandibular angle to chin point 11 11 10 10.1   Mandibular angle to nasal crease 10.1 10 9.9. 9.4   Mandibular angle to medial eye 11 11 11.1 11.4   Mandibular angle to lateral eye 8.9 8.9 8.7 8.9   Medial eye to chin point (just lateral) 9.8 9.9 9.5 9.8   Total face composite 76 75.8 72.8 73     3.2 improvement  2.8 improvement   Facial Circumference      Diagonal: chin to crown of head      Submental:              Measurement (distance to anterior tragus:  cm):                         Neck Volume      Lower Neck  (distance from tragus: 11.5cm) 32.1 31.6    Middle Neck  (distance from tragus: 11cm) 32.9 32.9    Upper Neck  (distance from tragus: 10cm) 34.2 33.2    Total neck composite 99.2 97.7

## 2025-03-10 NOTE — TELEPHONE ENCOUNTER
Lotus from [facility] called to confirm if Dr Case has received the prescription for PCD?  Please confirm when received. Thank you.

## 2025-03-11 NOTE — PROGRESS NOTES
[facility] Note    Diagnosis:   cT2 cN2 cM0 Laryngeal (Supraglottis) SCCa, p16+  Left Lung adenocarcinoma and concerning R lung mass s/p RT     Treatment History:   ChemoRT with weekly Cisplatin: 10/31/23-12/7/23  RT to Left/Right Lung masses with Dr. Case 01/2025    Visit Diagnosis:  1. Head and neck cancer (HCC)        History of Present Illness: 54F was referred for [id number] squamous cell of the larynx (supraglottis)    -She has had 1 year of throat pain, R>L. This was associated with otalgia. She lost 10-15 lb. She has direct laryngoscopy x 2 that was negative. However, she noted more dysphagia and weight loss. Imaging from 9/13/23 was concerning for a suprglottic mass not involving the  pre-epiglottic or paraglottic space. No neck nodes. CT chest negative. Repeat ENT evaluation was consistent with Squamous cell carcinoma. She had a PET/CT done on 10/3/23 at [facility]-Noted to have bilateral cervical LN involvement. She has a 40 pack year smoking history and still smokes 1 PPD.     -Her case was discussed at [facility] and primary surgery vs. Primary RT were discussed for cT2 cN0 cM0 disease. She opted for primary RT. However this discussion was done before her PET results.    -On 10/3/23, I met her for the first time. We discussed her PET/CT results from earlier today showing bilateral cervical Frederic disease. We discussed that primary RT alone would not be recommended. We discussed that we should consider surgery, induction chemotherapy or chemoRT. She states that she is adamantly against chemotherapy. She states that the pain is the worse part. No current dysphagia. She eventually was agreeable to chemoRT.     -ChemoRT with weekly Cisplatin: 10/31/23-12/7/23    -PET/CT on 3/1/24: LUCERO in head/neck. 7 mm LLL lung nodule without uptake.    -10/2024: bronchoscopy-left lung mass + for adenocarcinoma, also concerning R lung mass. She completed RT to these masses with Dr. Case in 01/2025     -CT NCAP  03/2025: Neck without disease. Edematous changes in neck from treatment. LLL nodule appears smaller. RUL nodule resolved. Met with ENT-swelling has gone down.    Interval History:  -Has good days and bad days. Working on getting approval for a lymphedema tactile machine. She is going to discuss RT today. Still following with OT.   -Met with Dr. Au 02/2025-LUCERO. Edema is getting better   -CT reviewed. Will meet with Radiation oncology today   -Voice is getting better. Swallowing is better. Breathing is better   -smoking 1/2 PPD. Has cut down on drinking     Review of Systems: 12 Point ROS was completed and pertinent positives are in the HPI    Current Outpatient Medications on File Prior to Visit   Medication Sig Dispense Refill    nicotine 21 MG/24HR Transdermal Patch 24 Hr Place 1 patch onto the skin daily. (Patient taking differently: Place 1 patch onto the skin daily. In addition to 14 mg/24 hour patch)      cyclobenzaprine 5 MG Oral Tab Take 1 tablet (5 mg total) by mouth 3 (three) times daily as needed for Muscle spasms. 60 tablet 0    HYDROmorphone 2 MG Oral Tab Take 1 tablet (2 mg total) by mouth 3 (three) times daily as needed for Pain. 60 tablet 0    Cholecalciferol (VITAMIN D3) 10 MCG (400 UNIT) Oral Cap Take by mouth.      Respiratory Therapy Supplies (NEBULIZER/TUBING/MOUTHPIECE) Does not apply Kit 1 kit As Directed. Nebulizer tubing and mouthpiece only - no machine needed. 1 each 2    ondansetron (ZOFRAN) 4 mg tablet Take 1 tablet (4 mg total) by mouth every 8 (eight) hours as needed for Nausea. 30 tablet 0    SYMBICORT 160-4.5 MCG/ACT Inhalation Aerosol Inhale 2 puffs into the lungs 2 (two) times daily. Rinse mouth after use. 1 each 5    Respiratory Therapy Supplies (FULL KIT NEBULIZER SET) Does not apply Misc 1 kit As Directed. 1 each 0    famotidine 40 MG Oral Tab       ALPRAZOLAM 0.5 MG Oral Tab Take 1 tab po 30 minute before radiation daily 30 tablet 0    cyanocobalamin 1000 MCG Oral Tab Take 1  tablet (1,000 mcg total) by mouth daily.      MONTELUKAST 10 MG Oral Tab TAKE 1 TABLET(10 MG) BY MOUTH EVERY NIGHT 90 tablet 0    albuterol 108 (90 Base) MCG/ACT Inhalation Aero Soln Inhale 2 puffs into the lungs every 4 (four) hours as needed. 54 g 1    fluticasone propionate 50 MCG/ACT Nasal Suspension SHAKE LIQUID AND USE 2 SPRAYS IN EACH NOSTRIL DAILY 48 g 1    prochlorperazine (COMPAZINE) 10 mg tablet Take 1 tablet (10 mg total) by mouth every 6 (six) hours as needed for Nausea. (Patient not taking: Reported on 3/11/2025) 90 tablet 0    gabapentin 300 MG Oral Cap Take 1 capsule (300 mg total) by mouth 2 (two) times a day AND 2 capsules (600 mg total) nightly. (Patient not taking: Reported on 3/11/2025) 360 capsule 0    pilocarpine 5 MG Oral Tab Take 2 tablets (10 mg total) by mouth 3 (three) times daily. (Patient not taking: Reported on 3/11/2025) 180 tablet 3    ipratropium-albuterol 0.5-2.5 (3) MG/3ML Inhalation Solution Take 3 mL by nebulization 4 (four) times daily. (Patient not taking: Reported on 3/11/2025) 360 mL 5    pantoprazole 40 MG Oral Tab EC Take 1 tablet (40 mg total) by mouth 2 (two) times daily. (Patient not taking: Reported on 10/28/2024)      triamcinolone 0.1 % External Cream Apply 1 Application topically 2 (two) times daily. (Patient not taking: Reported on 3/11/2025) 45 g 0    EPINEPHrine 0.3 MG/0.3ML Injection Solution Auto-injector Inject 0.3 mL (1 each total) as directed one time. Injectf into the muscle one time as needed for anaphylaxis for up to 2 doses (Patient not taking: Reported on 3/11/2025)       Current Facility-Administered Medications on File Prior to Visit   Medication Dose Route Frequency Provider Last Rate Last Admin    [COMPLETED] iopamidol 76% (ISOVUE-370) injection for power injector  115 mL Intravenous ONCE PRN Liane Case MD   115 mL at 03/04/25 1414     Past Medical History:    ALCOHOL USE    Allergic rhinitis    Anemia    Anxiety    Asthma (HCC)    BLOOD  DISORDER    anemia    Esophageal reflux    Exposure to medical diagnostic radiation    Last treatment 2024    Extrinsic asthma, unspecified    Head and neck cancer (HCC)    High blood pressure    HTN (hypertension)    Hx of motion sickness    Irritable bowel syndrome    Migraines    Personal history of antineoplastic chemotherapy    Last treatment 2024    Pneumonia due to organism    PONV (postoperative nausea and vomiting)    \"violently ill\" at [facility] after Anesthesia; UI next month didn't have a problem, Anesthesia said was probably from the gas used at [place]; 2024: Woke up from anesthesia during last procedure    Problems with swallowing    5/3/23 pt states has been limited to soft foods d/t growth in throat; denies difficulty swallowing liquids    Reflux    Ulcer    Visual impairment    Reading glasses     Past Surgical History:   Procedure Laterality Date    Benign biopsy right  2013    FA          Colonoscopy      D & c      Endometrial ablation      Excision turbinate,submucous  2013    Procedure: ADENOIDECTOMY;  Surgeon: Mc Ayala MD;  Location: [facility]    Excision turbinate,submucous  2013    Procedure: ADENOIDECTOMY;  Surgeon: Mc Ayala MD;  Location: [facility]  section level i          Other surgical history      CS, esphogel polypectomy x2    Other surgical history      liposuction    Removal adenoids,primary,12+ y/o  2013    Procedure: ENDOSCOPIC SUBMUCOUS RESECTION INFERIOR TURBINATES;  Surgeon: Mc Ayala MD;  Location: [facility]    Repair of nasal septum  2013    Procedure: SEPTOPLASTY NASAL;  Surgeon: Mc Ayala MD;  Location: [facility]     Social History     Socioeconomic History    Marital status:     Number of children: 1   Occupational History    Occupation: [employer]     Comment: working 3 days per week   Tobacco Use    Smoking  status: Every Day     Current packs/day: 0.50     Average packs/day: 0.5 packs/day for 51.2 years (25.6 ttl pk-yrs)     Types: Cigarettes     Start date: 1974     Passive exposure: Past    Smokeless tobacco: Former     Types: Chew     Quit date: 5/6/2008   Vaping Use    Vaping status: Never Used   Substance and Sexual Activity    Alcohol use: Yes     Alcohol/week: 6.0 standard drinks of alcohol     Types: 6 Standard drinks or equivalent per week     Comment: 10/06/2023 - Patient no longer consumes alcohol    Drug use: Yes     Types: Cannabis     Comment: Cannabsis occoasioanlly      Family History   Problem Relation Age of Onset    Cancer Father         Esophagus and lung cancer    Diabetes Father     Breast Cancer Paternal Grandmother 60        age 60's       Physical Exam  Height: 156.6 cm (5' 1.65\") (03/11 1303)  Weight: 57.8 kg (127 lb 8 oz) (03/11 1303)  BSA (Calculated - sq m): 1.57 sq meters (03/11 1303)  Pulse: 98 (03/11 1303)  BP: 124/81 (03/11 1303)  Temp: 97.5 °F (36.4 °C) (03/11 1303)  Do Not Use - Resp Rate: --  SpO2: 98 % (03/11 1303)    General: NAD, AOX3  HEENT: clear op, mmm, no jvd, no scleral icterus  LN: Neck LAD has resolved   CV: RRR S1S2 no murmurs  Extremities: No edema   Lungs:no increased work of breathing  Abd: soft nt nd +BS no hepatosplenomegaly  Neuro: CN: II-XII grossly intact      Results:  Lab Results   Component Value Date    WBC 4.6 09/11/2024    HGB 12.5 09/11/2024    HCT 36.1 09/11/2024    MCV 96.3 09/11/2024    .0 09/11/2024     Lab Results   Component Value Date     09/11/2024    K 3.7 09/11/2024    CO2 27.0 09/11/2024     09/11/2024    BUN 14 09/11/2024    GLUCOSE 91 10/17/2014    ALB 4.5 09/11/2024       No results found for: \"LDH\"    Radiology: reviewed   PET/CT   1. Large markedly hypermetabolic mass centered at the right epiglottis with involvement of the right aryepiglottic fold and piriform sinus, consistent with the known malignancy.   2. Bilateral  FDG-avid cervical lymph nodes, as detailed above, suspicious for metastatic involvement.   3. There is an FDG avid node positioned anterior to the right-sided , corresponding to a small enhancing focus seen on the previous CT, is of uncertain clinical significance. Attention on follow-up imaging.   4. No FDG PET evidence of distant metastases.     Addendum    Tumor cells are positive for p16 (>75% of tumor cells)  Original diagnosis remains unchanged       Pathology: reviewed   A. Larynx, Supraglottic Mass; Biopsy:  Superficial fragments of squamous cell carcinoma with necrosis     B. Larynx, Supraglottic Mass; Larynx, Biopsy:  Fragments of moderately to poorly differentiated invasive squamous cell carcinoma  See comment    Assessment and Plan:  cT2 cN2 cM0 Laryngeal SCCa, p16+  -we reviewed NCCN guidelines and discussed primary surgery, chemoRT or induction chemotherapy. She initially stated that she is adamantly against chemotherapy and does not want chemotherapy. She eventually agreed with chemoRT with weekly Cisplatin 40 mg/m2 which finished on 12/7/23  -PET/CT on 3/1/24 with LUCERO. CT from 3/2025 LUCERO-RT changes  -PRN IVF  -Following with ENT for serial exams, Dr. Au at [facility]  -Continue to work with OT and speech therapy     LLL Adenocarcinoma  RUL Nodule  -S/p RT with Dr. Case 01/2025  -Most recent imaging appears stable. Will review with Dr. Case   -repeat imaging in 3 months '    Post RT swelling: working with OT. Short course of steroids offered     Current smoker: 1/2 PPD-smoking cessation discussed     Pain: 2/2 post treatment effects. Palliative following    MDM: high: head and neck cancer    RTC in 6 months     [facility]

## 2025-03-11 NOTE — PROGRESS NOTES
Patient here for follow-up. Had a recent CT. Would like to discuss results in depth.having increased pain, low appetite levels, some nausea, and fatigue. Will see palliative APN and RT today as well.

## 2025-03-11 NOTE — PROGRESS NOTES
Nursing Follow-Up Note    Patient: Jeannette Morales  YOB: 1970  Age: 54 year old  Radiation Oncologist: Dr. Liane Case  Referring Physician: Liane Case  Chief Complaint: No chief complaint on file.    Date: 3/11/2025    Toxicities: n/a    Vital Signs: There were no vitals taken for this visit.,   Wt Readings from Last 6 Encounters:   01/17/25 54 kg (119 lb)   01/16/25 54 kg (119 lb)   12/11/24 55.6 kg (122 lb 9.6 oz)   11/13/24 55.2 kg (121 lb 12.8 oz)   10/28/24 56 kg (123 lb 6.4 oz)   10/28/24 54.7 kg (120 lb 9.6 oz)       Allergies:  Allergies[1]    Nursing Note: Hx of larynx cancer and presumed R lung ca (primary vs met unknown). Completed chemoRT to larynx 12/7/23. Completed SBRT to LLL lung mass and RML lung mass 12/17/24. Here for follow up today. CT STN/C/A done 3/4/25. Has been going to PT/OT lymphedema clinic. Saw Dr. Louise today. Saw Dr. Floyd 2/18/25 with flex laryngoscope. VS done at Dr. Louise visit. Pt states everything tastes awful. Has thick secretions. Pt requesting refill of pilocarpine. C/o postnasal drip. C/o clear mucous. Reports skin on neck is peeling. Very mildly pink. No peeling noted today. Continues to have fatigue.         Wt Readings from Last 6 Encounters:   03/11/25 57.8 kg (127 lb 8 oz)   01/17/25 54 kg (119 lb)   01/16/25 54 kg (119 lb)   12/11/24 55.6 kg (122 lb 9.6 oz)   11/13/24 55.2 kg (121 lb 12.8 oz)   10/28/24 56 kg (123 lb 6.4 oz)              [1]   Allergies  Allergen Reactions    Latex RASH and OTHER (SEE COMMENTS)    Ceftin [Cefuroxime] RASH    Codeine     Oxycodone     Vicodin [Hydrocodone-Acetaminophen] OTHER (SEE COMMENTS)     Per patient allergy was noted 20 years ago, uncofirmed if true allergy     Clindamycin RASH    Penicillins RASH     Adult allergy - no hospitalization, no cardiac or organ damage

## 2025-03-11 NOTE — PATIENT INSTRUCTIONS
- WE WILL CALL TO SCHEDULE YOUR FOLLOW-UP APPOINTMENT WITH DR. BULLOCK IN 6 MONTHS      - CALL THE NURSING LINE AT [phone] IF YOU HAVE ANY QUESTIONS/CONCERNS REGARDING RADIATION THERAPY

## 2025-03-11 NOTE — PROGRESS NOTES
[facility]  RADIATION ONCOLOGY   FOLLOW UP     Jeannette Morales  4/20/1970    DIAGNOSIS: laryngeal cancer     CANCER HISTORY   -locally advanced SCCA larynx s/p CRT 12/2023  -biopsy proven adenocarcinoma of LLL, T1 N0, s/p SBRT 50 Gy/5 fractions, 12/2024  -SBRT to incidental PET positive RML nodule, 50 Gy/5 fractions, 12/2024    INTERIM HISTORY   Recent ENT evaluation was ok; recent DDS eval was ok; CT 3/4/2025 shows stable appearance of neck/larynx. Post SBRT changes in the LLL and RML. No new lung lesion or thoracic LAD.    Gained 3#. Taste buds still not great. Swallowing liquids/solids ok. Neck edema/pain on treatment with ERNESTINE Rodriguez. Getting a peripheral compression device. Finds OT extremely helpful.    Cutting back on smoking.     EXAM   +neck edema  Skin w/o erythema  No masses    IMPRESSION/PLAN   1 year 3 months out from CRT 12/2024  F/u 6 mo  CT n/ch/a/p per Dr Louise in 3 mo  F/u DDS, OT  Declines SLP for now  F/u ENT q 3 mo  Refill pilocarpine    Liane Case MD  Radiation Oncology    20 minutes were spent with the patient, more than 50 percent on counseling/coordination of care (discuss disease status, management of any side effects, future follow up plans)

## 2025-03-11 NOTE — PROGRESS NOTES
Palliative Care Follow Up Note     Patient Name: Jeannette Morales   YOB: 1970   Medical Record Number: [id number]   CSN: [id number]   Date of visit: 3/11/2025     Chief Complaint/Reason for Visit:  Pain follow up     History of Present Illness:         Jeannette Morales is a 54 year old female with larynx cancer and lung cancer who completed chemo/RT over a year ago.  She continues to complain of bilateral shoulder, neck,jaw and ear pain that is constant and varies in intensity.  The pain is deep achy throbbing spasm pain.   She has been working with a lymphedema specialist that she feels has been helping her pain and edema.  She does feel this has helped soften area and requires daily massage.  She remains hoarse.  She currently is using dilaudid 2mg BID alternating with ibuprofen 400mg and acetaminophen 1000mg.  She feels gabapentin has been helpful for SANNA and lymphedema pain.   She is using compazine 2X daily to help with nausea caused by dilaudid.   She is able to sleep at night.  She is excited about a new lymphedema machine that she will be using soon.   She is hopeful to be able to wean from dilaudid soon.  .                    Problem List:  Patient Active Problem List   Diagnosis    Asthma (HCC)    Deviated nasal septum    GERD (gastroesophageal reflux disease)    Tobacco abuse    Fibroadenoma    Verruca    Sinusitis    Knee contusion    Esophageal spasm    Iron deficiency anemia due to chronic blood loss    Sinusitis, acute    Subacute pansinusitis    Iron deficiency    Mass of breast, right    Breast tenderness    Chronic bronchitis (HCC)    Persistent cough    Fatigue, unspecified type    Menorrhagia with regular cycle    Anxiety    Epiglottic lesion    Seasonal allergies    Sore throat    Head and neck cancer (HCC)    Malignant neoplasm of overlapping sites of larynx (HCC)    Palliative care by specialist    HTN (hypertension)    Nausea      Medical History:  Past Medical History:     ALCOHOL USE    Allergic rhinitis    Anemia    Anxiety    Asthma (HCC)    BLOOD DISORDER    anemia    Esophageal reflux    Exposure to medical diagnostic radiation    Last treatment 2024    Extrinsic asthma, unspecified    Head and neck cancer (HCC)    High blood pressure    HTN (hypertension)    Hx of motion sickness    Irritable bowel syndrome    Migraines    Personal history of antineoplastic chemotherapy    Last treatment 2024    Pneumonia due to organism    PONV (postoperative nausea and vomiting)    \"violently ill\" at [facility] after Anesthesia; UI next month didn't have a problem, Anesthesia said was probably from the gas used at [place]; 2024: Woke up from anesthesia during last procedure    Problems with swallowing    5/3/23 pt states has been limited to soft foods d/t growth in throat; denies difficulty swallowing liquids    Reflux    Ulcer    Visual impairment    Reading glasses     Surgical History:  Past Surgical History:   Procedure Laterality Date    Benign biopsy right  2013    FA          Colonoscopy      D & c      Endometrial ablation      Excision turbinate,submucous  2013    Procedure: ADENOIDECTOMY;  Surgeon: Mc Ayala MD;  Location: [facility]    Excision turbinate,submucous  2013    Procedure: ADENOIDECTOMY;  Surgeon: Mc Ayala MD;  Location: [facility]    Hc  section level i          Other surgical history      CS, esphogel polypectomy x2    Other surgical history      liposuction    Removal adenoids,primary,12+ y/o  2013    Procedure: ENDOSCOPIC SUBMUCOUS RESECTION INFERIOR TURBINATES;  Surgeon: Mc Ayala MD;  Location: [facility]    Repair of nasal septum  2013    Procedure: SEPTOPLASTY NASAL;  Surgeon: Mc Ayala MD;  Location: [facility]       Allergies:  Allergies   Allergen Reactions    Latex RASH and OTHER (SEE COMMENTS)    Ceftin [Cefuroxime]  RASH    Codeine     Oxycodone     Vicodin [Hydrocodone-Acetaminophen] OTHER (SEE COMMENTS)     Per patient allergy was noted 20 years ago, uncofirmed if true allergy     Clindamycin RASH    Penicillins RASH     Adult allergy - no hospitalization, no cardiac or organ damage       Palliative Care Social History:    Marital Status:  she is engaged to Ayad who is very supportive    Functional History:    ADLs: Independent.  Not working currently which frustrates her    Medications:  Current Outpatient Medications   Medication Sig Dispense Refill    cyclobenzaprine 5 MG Oral Tab Take 1 tablet (5 mg total) by mouth 3 (three) times daily as needed for Muscle spasms. 90 tablet 0    gabapentin 300 MG Oral Cap Take 1 capsule (300 mg total) by mouth daily AND 2 capsules (600 mg total) 2 (two) times a day. 450 capsule 0    ibuprofen 400 MG Oral Tab Take 1 tablet (400 mg total) by mouth every 6 (six) hours as needed for Pain. 120 tablet 1    HYDROmorphone 2 MG Oral Tab Take 1 tablet (2 mg total) by mouth 2 (two) times daily as needed for Pain. 60 tablet 0    Acetaminophen 500 MG Oral Cap Take 2 capsules (1,000 mg total) by mouth every 8 (eight) hours as needed for Fever. 90 capsule 1    nicotine 21 MG/24HR Transdermal Patch 24 Hr Place 1 patch onto the skin daily. (Patient taking differently: Place 1 patch onto the skin daily. In addition to 14 mg/24 hour patch)      predniSONE 10 MG Oral Tab Take 6 tablets (60 mg total) by mouth daily for 7 days. 42 tablet 0    PILOCARPINE 5 MG Oral Tab Take 2 tablets (10 mg total) by mouth 3 (three) times daily. 180 tablet 3    prochlorperazine (COMPAZINE) 10 mg tablet Take 1 tablet (10 mg total) by mouth every 6 (six) hours as needed for Nausea. (Patient not taking: Reported on 3/11/2025) 90 tablet 0    Cholecalciferol (VITAMIN D3) 10 MCG (400 UNIT) Oral Cap Take by mouth.      Respiratory Therapy Supplies (NEBULIZER/TUBING/MOUTHPIECE) Does not apply Kit 1 kit As Directed. Nebulizer tubing  and mouthpiece only - no machine needed. 1 each 2    ondansetron (ZOFRAN) 4 mg tablet Take 1 tablet (4 mg total) by mouth every 8 (eight) hours as needed for Nausea. 30 tablet 0    SYMBICORT 160-4.5 MCG/ACT Inhalation Aerosol Inhale 2 puffs into the lungs 2 (two) times daily. Rinse mouth after use. 1 each 5    ipratropium-albuterol 0.5-2.5 (3) MG/3ML Inhalation Solution Take 3 mL by nebulization 4 (four) times daily. (Patient not taking: Reported on 3/11/2025) 360 mL 5    Respiratory Therapy Supplies (FULL KIT NEBULIZER SET) Does not apply Misc 1 kit As Directed. 1 each 0    pantoprazole 40 MG Oral Tab EC Take 1 tablet (40 mg total) by mouth 2 (two) times daily. (Patient not taking: Reported on 10/28/2024)      triamcinolone 0.1 % External Cream Apply 1 Application topically 2 (two) times daily. (Patient not taking: Reported on 3/11/2025) 45 g 0    famotidine 40 MG Oral Tab       ALPRAZOLAM 0.5 MG Oral Tab Take 1 tab po 30 minute before radiation daily 30 tablet 0    cyanocobalamin 1000 MCG Oral Tab Take 1 tablet (1,000 mcg total) by mouth daily.      MONTELUKAST 10 MG Oral Tab TAKE 1 TABLET(10 MG) BY MOUTH EVERY NIGHT 90 tablet 0    albuterol 108 (90 Base) MCG/ACT Inhalation Aero Soln Inhale 2 puffs into the lungs every 4 (four) hours as needed. 54 g 1    fluticasone propionate 50 MCG/ACT Nasal Suspension SHAKE LIQUID AND USE 2 SPRAYS IN EACH NOSTRIL DAILY 48 g 1    EPINEPHrine 0.3 MG/0.3ML Injection Solution Auto-injector Inject 0.3 mL (1 each total) as directed one time. Injectf into the muscle one time as needed for anaphylaxis for up to 2 doses (Patient not taking: Reported on 3/11/2025)         Review of Systems:  General:  Fatigue.  Feels well.    Respiratory:  Denies SOB, denies cough  Cardiac:  Denies chest pain, heart palpitations  Abdomen:  Denies constipation, diarrhea.  Denies pain.  Nausea today  Psych:  No complaints.  Sleeping well    Palliative Performance Scale:  90 %    Physical  Examination:  General: Patient is alert and oriented, not in acute distress.  Resp: normal excursions and effort  Chest: normal rate  MSK: normal gait  Skin:  intact  Psych:  Mood/Affect appropriate    Advanced Directives Discussed and Completed:     HCPOA/Health Surrogate:      There is no completed HCPOA documentation on file in [facility].  Pain was focus today    Palliative Care:   Patient feels her QOL is not good as she has not recovered from her cancer treatment as expected.  She has not been able to work causing financial hardship.  She is highly motivated to get off dilaudid since she doesn't feel good while taking it.      Discussed long term use of dilaudid for this pain is not optimal.  Will continue ibuprofen, acetaminophen and cyclobenzaprine since these are offering some benefit but she is not maximizing use due to cost.   She will continue gabapentin and most likely will need this long term to optimize pain control.  This can be managed by her PCP in the future.    The goal is to wean off dilaudid as pain continues to improve with lymphedema treatment and start of lymphedema home machine  Will refill dilaudid for another month and decrease to 2 tabs daily.    Her pain is sequela from past cancer treatment and most likely will be chronic.    Palliative will continue to follow while weaning dilaudid.  The ongoing chronic non opioid pain management can be done with her PCP once off dilaudid.  Will evaluate next month to determine if further weaning or consideration for pain clinic transition will be needed        Impression/Plan:   Chronic Pain after cancer treatment  Dilaudid 2mg BID prn  Acetaminophen 1G TID prn  Ibuprofen 400mg Q 6 prn  Gabapentin 300/600/600mg  PT/OT/ST to neck area  Cyclobenzaprine 5mg TID  Facial lymphedema pump      Planned Follow up: 2 months      I spent a total of 40 minutes with the patient today, which included all of the following:direct face to face contact, history taking,  physical examination, and >50% was spent counseling and coordinating care        The 21st Century Cures Act makes medical notes like these available to patients in the interest of transparency. Please be advised this is a medical document. Medical documents are intended to carry relevant information, facts as evident, and the clinical opinion of the practitioner. The medical note is intended as peer to peer communication and may appear blunt or direct. It is written in medical language and may contain abbreviations or verbiage that are unfamiliar.        Electronically Signed by:  RAIN GALAVIZ Outpatient Palliative Nurse Practitioner

## 2025-03-13 NOTE — PROGRESS NOTES
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 Significant findings include      has a past medical history of ALCOHOL USE, Allergic rhinitis, Anemia, Anxiety (12/27/2017), Asthma ([facility]), BLOOD DISORDER, Esophageal reflux, Exposure to medical diagnostic radiation, Extrinsic asthma, unspecified, Head and neck cancer (HCC) (10/04/2023), High blood pressure, HTN (hypertension) (09/11/2023), motion sickness, Irritable bowel syndrome, Migraines, Personal history of antineoplastic chemotherapy, Pneumonia due to organism, PONV (postoperative nausea and vomiting), Problems with swallowing, Reflux, Ulcer, and Visual impairment.    She has no past medical history of Anesthesia complication, Arrhythmia, Blind, COLD/FLU, CONGESTIVE HEART FAILURE, COPD, Crohn's disease ([facility]), Dementia ([facility]), Depression,  Diabetes mellitus (HCC), Dialysis patient (HCC), Difficult intubation, Family history of malignant hyperthermia, Family history of pseudocholinesterase deficiency, Glaucoma, History of adverse reaction to anesthesia, History of chest pain, Hypercholesterolemia, INFECTIOUS DISEASE, Liver disease, Malignant hyperthermia, MITRAL VALVE PROLAPSE, Myocardial infarction (HCC), Neuropathy, OTHER DISEASES, Pseudocholinesterase deficiency, Seizure disorder (HCC), Shortness of breath, STROKE, Thyroid disease, Tuberculosis, or Viral hepatitis.   Precautions:  Lymphedema         Subjective:   Patient reports that the pain yesterday was really bad 8/10  Patient also reports that she did her massage 2 x yesterday   Patient reports that she got the wrong compression in the mail. She reports that she ordered a new one.  Pain 4 /10  current  Objective:    Today’s Treatment and Response:   Date 1/17/2025 1/21/2025   1/23/2025   01/28/2025 01.31.2025 2/4/2025   2/11/2025   2/13/2025   2/21/2025   2/26/2025   3/7/2025     Visit # 1/12 Visit # 2/12 Visit # 3/12 4 5 6 7 8 9 10 11   Manual Therapy (20 minutes)  MLD: initiated MLD to head and neck   Deep diaphragmatic breathing          Manual Therapy  40 min    MLD to head, neck and face     Soft tissue mobilization to hardened radiated areas     Deep diaphragmatic breathing     Discussed anatomy and function of the lymphatic system during treatment     Discussed next visit getting a compression garment to wear as much as possible during the day  Manual Therapy  40 min    MLD to head, neck and face     Soft tissue mobilization to hardened radiated areas     Deep diaphragmatic breathing     Next visit will fabricate an otoform pad for compression with a tubigrip support     Neck flexion/extension, jaw open and close  Manual Therapy  50 min    MLD to head, neck and face     Soft tissue mobilization to hardened radiated areas     Deep diaphragmatic breathing     Fabricated an otoform pad to  wear for compression over the fibrotic tissue in the neck/throat with size 10 3 ply around the otofotm to wear at night or at home during the day  anual Therapy  50 min    MLD to head, neck and face     Soft tissue mobilization to hardened radiated areas     Deep diaphragmatic breathing     Discussed wearing the otoform pad and compression when able during the day to soften the scar and decrease the swelling Manual Therapy  55 min    MLD to head, neck and face     Soft tissue mobilization to hardened radiated areas     Deep diaphragmatic breathing        Manual Therapy  4 0 min    MLD to head, neck and face     Soft tissue mobilization to hardened radiated areas     Deep diaphragmatic breathing     Pt. Notes that she did order the RTW compression from [employer]    She is going to have a demonstration of the flexitouch on Monday  Manual Therapy  40 min    MLD to head, neck and face     Soft tissue mobilization to hardened radiated areas     Deep diaphragmatic breathing    Fabricated a foam chip bag for pt. To wear to soften up the fibrotic area in the neck region. Pt. Will wear the chip bag under her compression garment    Introduced pt. To the theracane for trigger point release in the upper traps. Manual Therapy  40 min    MLD to head, neck and face     Soft tissue mobilization to hardened radiated areas     Deep diaphragmatic breathing    Gentle neck stretchs  Manual Therapy  40 min    MLD to head, neck and face     Soft tissue mobilization to hardened radiated areas     Deep diaphragmatic breathing    Gentle neck stretchs     Discussed use of the home pneumatic pump once she receives it. She can use it 1-2 x per day.    Pt. Notes that her compression should arrive today Manual Therapy  40 min    MLD to head, neck and face     Soft tissue mobilization to hardened radiated areas     Deep diaphragmatic breathing    Gentle neck stretchs     Applied kinesiotape over radiated fibrotic tissue in the mid throat. Discussed  with pt. To keep the tape on for up to 2 days.    There Ex ( 0 minutes):           There ex 15 min    Postural exercises on 1/2 foam roll shoulder flexion and pectoral stretch     Gentle neck stretches   Chin tucks   Jaw exercises     Measurements/PN      Self-Care  (10 minutes):   Management/Education: Explained Lymphedema diagnosis; informed patient of lymphedema precautions and risk reduction practices, and importance of skin care. Discussed and demonstrated bandaging and compression options                  Observation:  face right and left side from tragus to the chin and jaw to chin is soft and minimally boggy. The upper and middle neck is moderately densely boggy.     Measurements: 2 cm decrease in neck volume                             3.2 cm decrease in right facial volume                             2.8 cm decrease in left facial volume       Assessment:      Pt.'s tissue quality in her neck was much softer and more supple when the kinesiotape was removed. Pt. Had a good response to the taping     Goals:  (to be met in 12 visits)  1 Pt will wear clinic-fabricated head/neck compression  for 4-6 hours/day. met  2 Pt will be independent in HEP. met  3 Pt will be independent in self-manual lymph drainage. met  4 Pt will obtain good fitting head/neck compression garment.  continue    5 Reduce neck  lymphedema volume by 5-7 cm to improve appearance of neck .  continue  6 Reduce neck lymphedema density to soft and supple with soft and pliable  superficial tissue mobility to reduce sense of \"tightness\" and reduce infection risk of infection. continue  7 Pt will be independent in use of compression garments, self-manual lymph drainag and lymphedema precautions for life-long self-management of lymphedema.  continue  9. Improve lymphedema life impact score to 40 percent continue    Continue unmet goals         Plan: Continue skilled Occupational Therapy 1-2 x/week for 8 additional visits for  a total of 20 visits  over a 90 day period. Treatment will include: complete decongestive therapy        Patient/Family/Caregiver was advised of these findings, precautions, and treatment options and has agreed to actively participate in planning and for this course of care.    Thank you for your referral. If you have any questions, please contact me at Dept: [phone].    Sincerely,  Electronically signed by therapist: Ale Rodriguez, OT     Physician's certification required:  No  Please co-sign or sign and return this letter via fax as soon as possible to [phone].   I certify the need for these services furnished under this plan of treatment and while under my care.    X___________________________________________________ Date____________________      Charges: manual Therapy x 3 Total Timed Treatment:   40 min  Total Treatment Time: 40 min    Measurements:        Head and Neck Measurements (cm)   RIGHT   LEFT Right   2/13/2025   Left  2/13/2025     Face Volume       Tragus to corner of mouth 11.2 11 10.5 10.2   Tragus to chin point 14 14 13.1 13.2   Mandibular angle to chin point 11 11 10 10.1   Mandibular angle to nasal crease 10.1 10 9.9. 9.4   Mandibular angle to medial eye 11 11 11.1 11.4   Mandibular angle to lateral eye 8.9 8.9 8.7 8.9   Medial eye to chin point (just lateral) 9.8 9.9 9.5 9.8   Total face composite 76 75.8 72.8 73     3.2 improvement  2.8 improvement   Facial Circumference      Diagonal: chin to crown of head      Submental:              Measurement (distance to anterior tragus:  cm):                         Neck Volume      Lower Neck  (distance from tragus: 11.5cm) 32.1 31.6    Middle Neck  (distance from tragus: 11cm) 32.9 32.9    Upper Neck  (distance from tragus: 10cm) 34.2 33.2    Total neck composite 99.2 97.7

## (undated) NOTE — LETTER
Patient Name: Jeannette Morales  Surgery Date: 10/3/2024  Medical Record: [id number] CSN: [id number]      Surgeon(s):  Hany Brantley MD  Consent Procedure: ROBOT-ASSISTED NAVIGATIONAL BRONCHOSCOPY/RADIAL PROBE ENDOBRONCHIAL ULTRASOUND/FLUOROSCOPY/CRYOPROBE/CYTOLOGY/3D C ARM NO ENDOBRONCHIAL ULTRASOUND JUST ROBOT  Anesthesia Type: General    PATIENT CALLED FOR PRE-ADMISSION SCREEN. PATIENT UNAVAILABLE ON 10/3/24 FOR PROCEDURE AND NEEDS TO RESCHEDULE. ENCOURAGED TO CALL OFFICE TO DISCUSS, PLEASE FOLLOW.

## (undated) NOTE — LETTER
OUTSIDE TESTING RESULT REQUEST     IMPORTANT: FOR YOUR IMMEDIATE ATTENTION  Please FAX all test results listed below to: [phone]         * * * * If testing is NOT complete, arrange with patient A.S.A.P. * * * *      Patient Name: Dontae Regan  Surgery Date: 2023  Medical Record: [id number]  CSN: [id number]  : 1970 - A: 48 y     Sex: female  Surgeon(s):  Bernie Pickard MD  Procedure: DIRECT LARYNGOSCOPY AND BIOPSY  Anesthesia Type: General     Surgeon: Bernie Pickard MD     The following Testing and Time Line are REQUIRED PER ANESTHESIA     EKG READ AND SIGNED WITHIN   90 days  BMP (requires 4 hour fast) within  90 days      Thank You,   Sent by:CHRIS Wade - Pre-Admission Testing      The patient states she has an appointment with Dr. Toya Saavedra scheduled for 23.

## (undated) NOTE — Clinical Note
Did not sign consent today. Pt was not having a good day. Anxiety has been high. She is very worried about nausea, so I am having her start olanzapine nightly. She is firmly against idea of PEG tube. Drinks 2 pots of coffee and diet pepsi daily. She tells me water and gatorade make her vomit. She is willing to get daily IVF if needed.